# Patient Record
Sex: MALE | Race: WHITE | NOT HISPANIC OR LATINO | Employment: FULL TIME | URBAN - METROPOLITAN AREA
[De-identification: names, ages, dates, MRNs, and addresses within clinical notes are randomized per-mention and may not be internally consistent; named-entity substitution may affect disease eponyms.]

---

## 2019-05-25 ENCOUNTER — HOSPITAL ENCOUNTER (INPATIENT)
Facility: HOSPITAL | Age: 59
LOS: 5 days | Discharge: HOME HEALTH | DRG: 330 | End: 2019-05-30
Attending: EMERGENCY MEDICINE | Admitting: COLON & RECTAL SURGERY
Payer: COMMERCIAL

## 2019-05-25 ENCOUNTER — APPOINTMENT (EMERGENCY)
Dept: RADIOLOGY | Facility: HOSPITAL | Age: 59
DRG: 330 | End: 2019-05-25
Attending: EMERGENCY MEDICINE
Payer: COMMERCIAL

## 2019-05-25 DIAGNOSIS — D64.9 ANEMIA, UNSPECIFIED TYPE: ICD-10-CM

## 2019-05-25 DIAGNOSIS — R93.5 ABNORMAL CT OF THE ABDOMEN: ICD-10-CM

## 2019-05-25 DIAGNOSIS — R10.84 GENERALIZED ABDOMINAL PAIN: Primary | ICD-10-CM

## 2019-05-25 DIAGNOSIS — K51.018 ULCERATIVE PANCOLITIS WITH OTHER COMPLICATION: ICD-10-CM

## 2019-05-25 DIAGNOSIS — D72.825 BANDEMIA: ICD-10-CM

## 2019-05-25 DIAGNOSIS — R19.7 DIARRHEA, UNSPECIFIED TYPE: ICD-10-CM

## 2019-05-25 LAB
ALBUMIN SERPL-MCNC: 2 G/DL (ref 3.4–5)
ALP SERPL-CCNC: 58 IU/L (ref 35–126)
ALT SERPL-CCNC: 23 IU/L (ref 16–63)
ANION GAP SERPL CALC-SCNC: 11 MEQ/L (ref 3–15)
ANISOCYTOSIS BLD QL SMEAR: ABNORMAL
AST SERPL-CCNC: 14 IU/L (ref 15–41)
BACTERIA URNS QL MICRO: ABNORMAL /HPF
BASOPHILS # BLD: 0 K/UL (ref 0.01–0.1)
BASOPHILS NFR BLD: 0 %
BILIRUB SERPL-MCNC: 0.6 MG/DL (ref 0.3–1.2)
BILIRUB UR QL STRIP.AUTO: NEGATIVE MG/DL
BUN SERPL-MCNC: 12 MG/DL (ref 8–20)
CALCIUM SERPL-MCNC: 8.1 MG/DL (ref 8.9–10.3)
CHLORIDE SERPL-SCNC: 95 MEQ/L (ref 98–109)
CLARITY UR REFRACT.AUTO: ABNORMAL
CO2 SERPL-SCNC: 26 MEQ/L (ref 22–32)
COLOR UR AUTO: ABNORMAL
CREAT SERPL-MCNC: 0.8 MG/DL
DIFFERENTIAL METHOD BLD: ABNORMAL
EOSINOPHIL # BLD: 0 K/UL (ref 0.04–0.54)
EOSINOPHIL NFR BLD: 0 %
ERYTHROCYTE [DISTWIDTH] IN BLOOD BY AUTOMATED COUNT: 15.9 % (ref 11.6–14.4)
GFR SERPL CREATININE-BSD FRML MDRD: >60 ML/MIN/1.73M*2
GLUCOSE SERPL-MCNC: 174 MG/DL (ref 70–99)
GLUCOSE UR STRIP.AUTO-MCNC: NEGATIVE MG/DL
HCT VFR BLDCO AUTO: 32.3 %
HGB BLD-MCNC: 9.8 G/DL
HGB UR QL STRIP.AUTO: NEGATIVE
HYALINE CASTS #/AREA URNS LPF: ABNORMAL /LPF
KETONES UR STRIP.AUTO-MCNC: NEGATIVE MG/DL
LACTATE SERPL-SCNC: 1.2 MMOL/L (ref 0.4–2)
LEUKOCYTE ESTERASE UR QL STRIP.AUTO: NEGATIVE
LIPASE SERPL-CCNC: 24 U/L (ref 20–51)
LYMPHOCYTES # BLD: 0.84 K/UL (ref 1.2–3.5)
LYMPHOCYTES NFR BLD: 16 %
MAGNESIUM SERPL-MCNC: 1.8 MG/DL (ref 1.8–2.5)
MCH RBC QN AUTO: 22.3 PG (ref 28–33.2)
MCHC RBC AUTO-ENTMCNC: 30.3 G/DL (ref 32.2–36.5)
MCV RBC AUTO: 73.6 FL (ref 83–98)
MICROCYTES BLD QL SMEAR: ABNORMAL
MONOCYTES # BLD: 0.16 K/UL (ref 0.3–1)
MONOCYTES NFR BLD: 3 %
NEUTS BAND # BLD: 1.05 K/UL (ref 0–0.53)
NEUTS BAND # BLD: 3.21 K/UL (ref 1.7–7)
NEUTS BAND NFR BLD: 20 %
NEUTS SEG NFR BLD: 61 %
NITRITE UR QL STRIP.AUTO: NEGATIVE
OVALOCYTES BLD QL SMEAR: ABNORMAL
PDW BLD AUTO: ABNORMAL FL (ref 9.4–12.4)
PH UR STRIP.AUTO: 6 [PH]
PLATELET # BLD AUTO: 187 K/UL
PLATELET # BLD EST: ABNORMAL 10*3/UL
PLATELET CLUMP BLD QL SMEAR: PRESENT
POTASSIUM SERPL-SCNC: 3.9 MEQ/L (ref 3.6–5.1)
PROT SERPL-MCNC: 5.9 G/DL (ref 6–8.2)
PROT UR QL STRIP.AUTO: 1
RBC # BLD AUTO: 4.39 M/UL (ref 4.5–5.8)
RBC #/AREA URNS HPF: ABNORMAL /HPF
SODIUM SERPL-SCNC: 132 MEQ/L (ref 136–144)
SP GR UR REFRACT.AUTO: 1.03
SQUAMOUS URNS QL MICRO: 1 /HPF
UROBILINOGEN UR STRIP-ACNC: 1 EU/DL
WBC # BLD AUTO: 5.26 K/UL
WBC #/AREA URNS HPF: ABNORMAL /HPF

## 2019-05-25 PROCEDURE — 21400000 HC ROOM AND CARE CCU/INTERMEDIATE

## 2019-05-25 PROCEDURE — 81001 URINALYSIS AUTO W/SCOPE: CPT | Performed by: EMERGENCY MEDICINE

## 2019-05-25 PROCEDURE — 83605 ASSAY OF LACTIC ACID: CPT | Performed by: EMERGENCY MEDICINE

## 2019-05-25 PROCEDURE — 99223 1ST HOSP IP/OBS HIGH 75: CPT | Mod: 57 | Performed by: COLON & RECTAL SURGERY

## 2019-05-25 PROCEDURE — 99285 EMERGENCY DEPT VISIT HI MDM: CPT | Mod: 25

## 2019-05-25 PROCEDURE — 63600000 HC DRUGS/DETAIL CODE: Performed by: SURGERY

## 2019-05-25 PROCEDURE — 74177 CT ABD & PELVIS W/CONTRAST: CPT

## 2019-05-25 PROCEDURE — 87040 BLOOD CULTURE FOR BACTERIA: CPT | Mod: 91 | Performed by: EMERGENCY MEDICINE

## 2019-05-25 PROCEDURE — 63600105 HC IODINE BASED CONTRAST: Performed by: EMERGENCY MEDICINE

## 2019-05-25 PROCEDURE — 83690 ASSAY OF LIPASE: CPT | Performed by: EMERGENCY MEDICINE

## 2019-05-25 PROCEDURE — 36415 COLL VENOUS BLD VENIPUNCTURE: CPT | Performed by: EMERGENCY MEDICINE

## 2019-05-25 PROCEDURE — 85025 COMPLETE CBC W/AUTO DIFF WBC: CPT | Performed by: EMERGENCY MEDICINE

## 2019-05-25 PROCEDURE — 83735 ASSAY OF MAGNESIUM: CPT | Performed by: EMERGENCY MEDICINE

## 2019-05-25 PROCEDURE — 25800000 HC PHARMACY IV SOLUTIONS: Performed by: EMERGENCY MEDICINE

## 2019-05-25 PROCEDURE — 80053 COMPREHEN METABOLIC PANEL: CPT | Performed by: EMERGENCY MEDICINE

## 2019-05-25 PROCEDURE — 25000000 HC PHARMACY GENERAL: Performed by: SURGERY

## 2019-05-25 PROCEDURE — 63600000 HC DRUGS/DETAIL CODE: Performed by: EMERGENCY MEDICINE

## 2019-05-25 RX ORDER — ENOXAPARIN SODIUM 100 MG/ML
40 INJECTION SUBCUTANEOUS
Status: DISCONTINUED | OUTPATIENT
Start: 2019-05-26 | End: 2019-05-30 | Stop reason: HOSPADM

## 2019-05-25 RX ORDER — DEXTROSE 40 %
15-30 GEL (GRAM) ORAL AS NEEDED
Status: DISCONTINUED | OUTPATIENT
Start: 2019-05-25 | End: 2019-05-30 | Stop reason: HOSPADM

## 2019-05-25 RX ORDER — HYDROMORPHONE HYDROCHLORIDE 1 MG/ML
0.5 INJECTION, SOLUTION INTRAMUSCULAR; INTRAVENOUS; SUBCUTANEOUS ONCE
Status: COMPLETED | OUTPATIENT
Start: 2019-05-25 | End: 2019-05-25

## 2019-05-25 RX ORDER — MORPHINE SULFATE 4 MG/ML
2 INJECTION, SOLUTION INTRAMUSCULAR; INTRAVENOUS ONCE
Status: COMPLETED | OUTPATIENT
Start: 2019-05-25 | End: 2019-05-25

## 2019-05-25 RX ORDER — DEXTROSE 50 % IN WATER (D50W) INTRAVENOUS SYRINGE
25 AS NEEDED
Status: DISCONTINUED | OUTPATIENT
Start: 2019-05-25 | End: 2019-05-30 | Stop reason: HOSPADM

## 2019-05-25 RX ORDER — SODIUM CHLORIDE, SODIUM LACTATE, POTASSIUM CHLORIDE, CALCIUM CHLORIDE 600; 310; 30; 20 MG/100ML; MG/100ML; MG/100ML; MG/100ML
INJECTION, SOLUTION INTRAVENOUS CONTINUOUS
Status: ACTIVE | OUTPATIENT
Start: 2019-05-25 | End: 2019-05-27

## 2019-05-25 RX ORDER — IBUPROFEN 200 MG
16-32 TABLET ORAL AS NEEDED
Status: DISCONTINUED | OUTPATIENT
Start: 2019-05-25 | End: 2019-05-30 | Stop reason: HOSPADM

## 2019-05-25 RX ORDER — PREDNISONE 10 MG/1
30 TABLET ORAL DAILY
COMMUNITY
End: 2019-05-30 | Stop reason: HOSPADM

## 2019-05-25 RX ORDER — SODIUM CHLORIDE 9 MG/ML
125 INJECTION, SOLUTION INTRAVENOUS CONTINUOUS
Status: DISCONTINUED | OUTPATIENT
Start: 2019-05-25 | End: 2019-05-26

## 2019-05-25 RX ORDER — METRONIDAZOLE 500 MG/100ML
500 INJECTION, SOLUTION INTRAVENOUS
Status: DISCONTINUED | OUTPATIENT
Start: 2019-05-25 | End: 2019-05-29

## 2019-05-25 RX ADMIN — CEFTRIAXONE SODIUM 1 G: 1 INJECTION, POWDER, FOR SOLUTION INTRAVENOUS at 22:39

## 2019-05-25 RX ADMIN — MORPHINE SULFATE 2 MG: 4 INJECTION INTRAVENOUS at 15:42

## 2019-05-25 RX ADMIN — IOHEXOL 80 ML: 300 INJECTION, SOLUTION INTRAVENOUS at 19:39

## 2019-05-25 RX ADMIN — MORPHINE SULFATE 2 MG: 4 INJECTION INTRAVENOUS at 17:00

## 2019-05-25 RX ADMIN — HYDROMORPHONE HYDROCHLORIDE 0.5 MG: 1 INJECTION, SOLUTION INTRAMUSCULAR; INTRAVENOUS; SUBCUTANEOUS at 22:38

## 2019-05-25 RX ADMIN — SODIUM CHLORIDE 125 ML/HR: 9 INJECTION, SOLUTION INTRAVENOUS at 21:33

## 2019-05-25 RX ADMIN — METRONIDAZOLE 500 MG: 500 INJECTION, SOLUTION INTRAVENOUS at 23:18

## 2019-05-25 RX ADMIN — MORPHINE SULFATE 2 MG: 4 INJECTION INTRAVENOUS at 20:15

## 2019-05-25 RX ADMIN — SODIUM CHLORIDE 1000 ML: 9 INJECTION, SOLUTION INTRAVENOUS at 15:41

## 2019-05-25 NOTE — ED PROVIDER NOTES
--------------------------------------------------------------------------------------------------  ER 2    HPI  HPI   Chief Complaint   Patient presents with   • Abdominal Pain      HPI      Abdominal pain mainly left upper  Persistent diarrhea reports 27 episodes a day  Reports intermittent fevers at home  Status post colonoscopy 9 days ago by Dr. GREEN    He reports that he is been having diarrhea for over a year    Patient reports he does not have a primary doctor    No history of abdominal surgeries    Procedure results are noted on patient's paperwork:             Past Hx  Patient History   History reviewed. No pertinent past medical history.  History reviewed. No pertinent surgical history.  History reviewed. No pertinent family history.  Social History   Substance Use Topics   • Smoking status: Never Smoker   • Smokeless tobacco: Never Used   • Alcohol use No           ROS  Review of Systems   Review of Systems  General     No chills or diaphoresis.  HEENT    No congestion, rhinorrhea, sore throat or trouble swallowing.     No photophobia or discharge.    No neck pain  Cardio/Pulm    No CP or tightness, palpitations, leg swelling, shortness of breath, or orthopnea.     No cough, wheezing, or stridor.  Abdomen and Back    No n/v. No black tarry stool.    No new back pain.     No dysuria, flank pain, frequency, hematuria or urgency.  Extremities and Skin    No joint swelling.    No new rash.  Neuro/Psych    No syncope, weakness, or headaches.     No confusion or self-injury.    All other systems reviewed and are negative.  Systems reviewed from RN triage:            EXAM  Physical Exam   ED Triage Vitals   Temp Heart Rate Resp BP SpO2   05/25/19 1422 05/25/19 1422 05/25/19 1422 05/25/19 1422 05/25/19 1422   36.9 °C (98.5 °F) (!) 106 16 101/72 96 %      Temp Source Heart Rate Source Patient Position BP Location FiO2 (%) (Set)   05/25/19 1422 05/25/19 1540 05/25/19 1422 05/25/19 1422 --   Oral Monitor Sitting Right  upper arm      Physical Exam  General     Non-toxic appearance. NAD.   HEENT    Normocephalic and atraumatic.     Conjunctivae, EOM and lids are grossly normal. PERRL.    No rhinorrhea. Very dry mucous membranes    Normal ROM and full passive ROM of neck without pain. Neck supple.     No JVD. No tracheal deviation present.   Cardio/Pulm    RRR, normal heart sounds and intact distal pulses. No murmur heard.    No respiratory distress. Effort normal and normal BS bilat.     No stridor. No wheezes and no rales.   Abdomen and Back    Abd Soft Normal appearance and BS normal. Mod tender Left side, No distension.     No rebound. Mild guarding. No CVA tenderness.   Extremities and Skin    Extrem are grossly normal. normal ROM. No edema or tenderness.     Skin is warm, dry and intact. No rash noted. Not diaphoretic. No pallor.  Neuro/Psych    AAOx3. Cooperative and mood/affect normal.    No aphasia. Speech normal. No dysphasia or dysarthria.     Vision grossly normal     No gross CN deficit or facial weakness.     No gross motor deficit and moves all extremities equally.      No gross sensory deficit.        Nursing note and vitals reviewed.         Procedures    COURSE  MDM   ED Course & MDM     Labs Reviewed   COMPREHENSIVE METABOLIC PANEL - Abnormal        Result Value    Sodium 132 (*)     Potassium 3.9      Chloride 95 (*)     CO2 26      BUN 12      Creatinine 0.8      Glucose 174 (*)     Calcium 8.1 (*)     AST (SGOT) 14 (*)     ALT (SGPT) 23      Alkaline Phosphatase 58      Total Protein 5.9 (*)     Albumin 2.0 (*)     Bilirubin, Total 0.6      eGFR >60.0      Anion Gap 11     CBC - Abnormal     WBC 5.26      RBC 4.39 (*)     Hemoglobin 9.8 (*)     Hematocrit 32.3 (*)     MCV 73.6 (*)     MCH 22.3 (*)     MCHC 30.3 (*)     RDW 15.9 (*)     Platelets 187      MPV       DIFF COUNT - Abnormal     Differential Type Manu      Neutrophils 61      Lymphocytes 16      Monocytes 3      Eosinophils 0      Basophils 0       Bands 20 (*)     Neutrophils, Absolute 3.21      Lymphocytes, Absolute 0.84 (*)     Monocytes, Absolute 0.16 (*)     Eosinophils, Absolute 0.00 (*)     Basophils, Absolute 0.00 (*)     Bands, Absolute 1.05 (*)     Clumped Platelets Present      Anisocytosis 1+      Microcytes 1+      Ovalocytes Occasional      Platelet Estimate Adequate (150,000-400,000)     UA REFLEX CULTURE (MACROSCOPIC) - Abnormal     Color, Urine Melissa (*)     Clarity, Urine Cloudy (*)     Specific Gravity, Urine 1.027      pH, Urine 6.0      Leukocyte Esterase Negative      Nitrite, Urine Negative      Protein, Urine +1 (*)     Glucose, Urine Negative      Ketones, Urine Negative      Urobilinogen, Urine 1.0      Bilirubin, Urine Negative      Blood, Urine Negative     UA MICROSCOPIC - Abnormal     RBC, Urine 0 TO 4      WBC, Urine 0 TO 3      Squamous Epithelial +1 (*)     Hyaline Cast 3 TO 9 (*)     Bacteria, Urine None Seen     LIPASE - Normal    Lipase 24     LACTATE, VENOUS - Normal    Lactate 1.2     MAGNESIUM - Normal    Magnesium 1.8     BLOOD CULTURE    Narrative:     The following orders were created for panel order Blood Culture Blood, Venous.  Procedure                               Abnormality         Status                     ---------                               -----------         ------                     Blood Culture Blood, Venous[[id number]]                       In process                   Please view results for these tests on the individual orders.   BLOOD CULTURE    Narrative:     The following orders were created for panel order Blood Culture Blood, Venous.  Procedure                               Abnormality         Status                     ---------                               -----------         ------                     Blood Culture Blood, Venous[[id number]]                       In process                   Please view results for these tests on the individual orders.   BLOOD CULTURE   BLOOD CULTURE   CBC  AND DIFFERENTIAL    Narrative:     The following orders were created for panel order CBC and differential.  Procedure                               Abnormality         Status                     ---------                               -----------         ------                     CBC[[id number]]                           Abnormal            Final result               Diff Count[[id number]]                    Abnormal            Final result                 Please view results for these tests on the individual orders.   URINALYSIS REFLEX CULTURE    Narrative:     The following orders were created for panel order Urinalysis w/ reflex culture.  Procedure                               Abnormality         Status                     ---------                               -----------         ------                     UA Reflex to Culture (Mac...[[id number]]  Abnormal            Final result               UA Microscopic[[id number]]                Abnormal            Final result                 Please view results for these tests on the individual orders.   PATH REVIEW       CT ABDOMEN PELVIS WITH IV CONTRAST   Final Result   IMPRESSION:   1.  Marked gaseous dilation of the mid to distal transverse colon up to a   diameter of 17.4 cm. Proximal to this, there is stool throughout the colon.   Along the distal aspect of the dilated segment and distal to this, there is   diffuse wall thickening from the splenic flecture through the descending and   rectosigmoid colon, consistent with colitis, most likely infectious or   inflammatory, such as Crohn's disease. Given the presence of colitis and the   dilation of the transverse colon, findings are suspicious for toxic megacolon.   2.  Splenomegaly.      Finding:    Other   Acuity: Critical  Status:  CLOSED      Critical read back was performed and results were read back by Garth Green,   5/25/2019 9:14 PM.             Patient Vitals for the past 24 hrs:   BP Temp Temp src Pulse Resp  "SpO2 Height Weight   05/25/19 2200 122/75 - - 69 16 95 % - -   05/25/19 2132 139/82 - - 65 20 94 % - -   05/25/19 2014 124/74 - - 68 18 94 % - -   05/25/19 1900 113/81 - - 65 19 94 % - -   05/25/19 1830 126/76 - - 66 17 95 % - -   05/25/19 1800 112/75 - - 64 16 95 % - -   05/25/19 1730 121/82 - - 67 18 95 % - -   05/25/19 1700 119/75 - - 64 17 95 % - -   05/25/19 1630 116/76 - - 67 (!) 22 95 % - -   05/25/19 1600 113/71 - - 65 16 94 % - -   05/25/19 1540 114/64 - - 74 18 95 % - -   05/25/19 1422 101/72 36.9 °C (98.5 °F) Oral (!) 106 16 96 % 1.727 m (5' 8\") 72.6 kg (160 lb)       Critical Care  Performed by: GARTH GREEN  Authorized by: GARTH GREEN     Critical care provider statement:     Critical care time (minutes):  35    Critical care time was exclusive of:  Separately billable procedures and treating other patients    Critical care was time spent personally by me on the following activities:  Development of treatment plan with patient or surrogate, discussions with consultants, discussions with primary provider, evaluation of patient's response to treatment, examination of patient, review of old charts, re-evaluation of patient's condition, pulse oximetry, ordering and review of radiographic studies, ordering and review of laboratory studies and ordering and performing treatments and interventions      ED Course as of May 25 2216   Sat May 25, 2019   2122 D/w radiol  D/w surg  D/w treating RN  Pt npo  ivf  []      ED Course User Index  [] Garth Green MD         Clinical Impressions as of May 25 2216   Generalized abdominal pain   Bandemia   Diarrhea, unspecified type   Anemia, unspecified type   Abnormal CT of the abdomen          /75 (05/25/19 2200)    Temp      Pulse 69 (05/25/19 2200)   Resp 16 (05/25/19 2200)    SpO2 95 % (05/25/19 2200)        IMPRESSION  Final diagnoses:   [R10.84] Generalized abdominal pain   [D72.825] Bandemia   [R19.7] Diarrhea, unspecified type   [D64.9] Anemia, " unspecified type   [R93.5] Abnormal CT of the abdomen   Probable UC and toxic megacolon      PLAN  admit       -----------------------------  Garth Green MD  5/25/2019 @ 10:16 PM  --------------------------------------------------------------------------------------------------     Garth Green MD  05/25/19 2213

## 2019-05-25 NOTE — Clinical Note
Admitting Provider: NIMO ASKEW [[id number]]   Level of Care: Stepdown [14]   Bed request comments: 1 pav

## 2019-05-26 ENCOUNTER — APPOINTMENT (INPATIENT)
Dept: RADIOLOGY | Facility: HOSPITAL | Age: 59
DRG: 330 | End: 2019-05-26
Attending: STUDENT IN AN ORGANIZED HEALTH CARE EDUCATION/TRAINING PROGRAM
Payer: COMMERCIAL

## 2019-05-26 ENCOUNTER — ANESTHESIA EVENT (INPATIENT)
Dept: OPERATING ROOM | Facility: HOSPITAL | Age: 59
DRG: 330 | End: 2019-05-26
Payer: COMMERCIAL

## 2019-05-26 PROBLEM — K50.119 CROHN'S DISEASE OF COLON WITH COMPLICATION (CMS/HCC): Status: ACTIVE | Noted: 2019-05-26

## 2019-05-26 PROBLEM — K51.90 ULCERATIVE COLITIS (CMS/HCC): Status: RESOLVED | Noted: 2019-05-25 | Resolved: 2019-05-26

## 2019-05-26 PROBLEM — K51.90 ULCERATIVE COLITIS (CMS/HCC): Status: ACTIVE | Noted: 2019-05-25

## 2019-05-26 LAB
ANION GAP SERPL CALC-SCNC: 6 MEQ/L (ref 3–15)
ANISOCYTOSIS BLD QL SMEAR: ABNORMAL
BASOPHILS # BLD: 0 K/UL (ref 0.01–0.1)
BASOPHILS NFR BLD: 0 %
BUN SERPL-MCNC: 9 MG/DL (ref 8–20)
CALCIUM SERPL-MCNC: 7.6 MG/DL (ref 8.9–10.3)
CHLORIDE SERPL-SCNC: 99 MEQ/L (ref 98–109)
CO2 SERPL-SCNC: 28 MEQ/L (ref 22–32)
CREAT SERPL-MCNC: 0.7 MG/DL
DIFFERENTIAL METHOD BLD: ABNORMAL
EOSINOPHIL # BLD: 0 K/UL (ref 0.04–0.54)
EOSINOPHIL NFR BLD: 0 %
ERYTHROCYTE [DISTWIDTH] IN BLOOD BY AUTOMATED COUNT: 15.9 % (ref 11.6–14.4)
GFR SERPL CREATININE-BSD FRML MDRD: >60 ML/MIN/1.73M*2
GIANT PLATELETS BLD QL SMEAR: ABNORMAL
GLUCOSE SERPL-MCNC: 94 MG/DL (ref 70–99)
HCT VFR BLDCO AUTO: 26.7 %
HGB BLD-MCNC: 8 G/DL
LACTATE SERPL-SCNC: 0.7 MMOL/L (ref 0.4–2)
LYMPHOCYTES # BLD: 1.28 K/UL (ref 1.2–3.5)
LYMPHOCYTES NFR BLD: 32 %
MAGNESIUM SERPL-MCNC: 1.7 MG/DL (ref 1.8–2.5)
MCH RBC QN AUTO: 21.9 PG (ref 28–33.2)
MCHC RBC AUTO-ENTMCNC: 30 G/DL (ref 32.2–36.5)
MCV RBC AUTO: 73.2 FL (ref 83–98)
MONOCYTES # BLD: 0.28 K/UL (ref 0.3–1)
MONOCYTES NFR BLD: 7 %
NEUTS BAND # BLD: 0.12 K/UL (ref 0–0.53)
NEUTS BAND # BLD: 2.33 K/UL (ref 1.7–7)
NEUTS BAND NFR BLD: 3 %
NEUTS SEG NFR BLD: 58 %
OVALOCYTES BLD QL SMEAR: ABNORMAL
PATH REV BLD -IMP: NORMAL
PDW BLD AUTO: 10.3 FL (ref 9.4–12.4)
PLAT MORPH BLD: NORMAL
PLATELET # BLD AUTO: 335 K/UL
PLATELET # BLD EST: ABNORMAL 10*3/UL
PLATELET CLUMP BLD QL SMEAR: PRESENT
POLYCHROMASIA BLD QL SMEAR: ABNORMAL
POTASSIUM SERPL-SCNC: 4 MEQ/L (ref 3.6–5.1)
RBC # BLD AUTO: 3.65 M/UL (ref 4.5–5.8)
SODIUM SERPL-SCNC: 133 MEQ/L (ref 136–144)
TOXIC GRANULES BLD QL SMEAR: ABNORMAL
WBC # BLD AUTO: 4.01 K/UL

## 2019-05-26 PROCEDURE — 93005 ELECTROCARDIOGRAM TRACING: CPT | Performed by: STUDENT IN AN ORGANIZED HEALTH CARE EDUCATION/TRAINING PROGRAM

## 2019-05-26 PROCEDURE — 63600000 HC DRUGS/DETAIL CODE: Performed by: STUDENT IN AN ORGANIZED HEALTH CARE EDUCATION/TRAINING PROGRAM

## 2019-05-26 PROCEDURE — 36415 COLL VENOUS BLD VENIPUNCTURE: CPT | Performed by: SURGERY

## 2019-05-26 PROCEDURE — 74018 RADEX ABDOMEN 1 VIEW: CPT

## 2019-05-26 PROCEDURE — 83605 ASSAY OF LACTIC ACID: CPT | Performed by: SURGERY

## 2019-05-26 PROCEDURE — 85025 COMPLETE CBC W/AUTO DIFF WBC: CPT | Performed by: SURGERY

## 2019-05-26 PROCEDURE — 200200 PR NO CHARGE: Performed by: COLON & RECTAL SURGERY

## 2019-05-26 PROCEDURE — 99222 1ST HOSP IP/OBS MODERATE 55: CPT | Performed by: INTERNAL MEDICINE

## 2019-05-26 PROCEDURE — 80048 BASIC METABOLIC PNL TOTAL CA: CPT | Performed by: SURGERY

## 2019-05-26 PROCEDURE — 21400000 HC ROOM AND CARE CCU/INTERMEDIATE

## 2019-05-26 PROCEDURE — 25000000 HC PHARMACY GENERAL: Performed by: SURGERY

## 2019-05-26 PROCEDURE — 83735 ASSAY OF MAGNESIUM: CPT | Performed by: SURGERY

## 2019-05-26 PROCEDURE — 63600000 HC DRUGS/DETAIL CODE: Performed by: SURGERY

## 2019-05-26 RX ORDER — GABAPENTIN 300 MG/1
600 CAPSULE ORAL ONCE
Status: ACTIVE | OUTPATIENT
Start: 2019-05-26 | End: 2019-05-26

## 2019-05-26 RX ORDER — MORPHINE SULFATE 2 MG/ML
2 INJECTION, SOLUTION INTRAMUSCULAR; INTRAVENOUS ONCE
Status: COMPLETED | OUTPATIENT
Start: 2019-05-26 | End: 2019-05-26

## 2019-05-26 RX ORDER — CELECOXIB 200 MG/1
200 CAPSULE ORAL ONCE
Status: ACTIVE | OUTPATIENT
Start: 2019-05-26 | End: 2019-05-26

## 2019-05-26 RX ORDER — ACETAMINOPHEN 325 MG/1
975 TABLET ORAL ONCE
Status: ACTIVE | OUTPATIENT
Start: 2019-05-26 | End: 2019-05-26

## 2019-05-26 RX ORDER — KETOROLAC TROMETHAMINE 30 MG/ML
30 INJECTION, SOLUTION INTRAMUSCULAR; INTRAVENOUS ONCE
Status: COMPLETED | OUTPATIENT
Start: 2019-05-26 | End: 2019-05-26

## 2019-05-26 RX ORDER — CEFAZOLIN SODIUM 2 G/50ML
2 SOLUTION INTRAVENOUS
Status: DISCONTINUED | OUTPATIENT
Start: 2019-05-26 | End: 2019-05-27 | Stop reason: HOSPADM

## 2019-05-26 RX ADMIN — METRONIDAZOLE 500 MG: 500 INJECTION, SOLUTION INTRAVENOUS at 06:16

## 2019-05-26 RX ADMIN — CEFTRIAXONE SODIUM 1 G: 1 INJECTION, POWDER, FOR SOLUTION INTRAVENOUS at 22:05

## 2019-05-26 RX ADMIN — SODIUM CHLORIDE, POTASSIUM CHLORIDE, SODIUM LACTATE AND CALCIUM CHLORIDE: 600; 310; 30; 20 INJECTION, SOLUTION INTRAVENOUS at 00:33

## 2019-05-26 RX ADMIN — MORPHINE SULFATE 2 MG: 2 INJECTION, SOLUTION INTRAMUSCULAR; INTRAVENOUS at 06:49

## 2019-05-26 RX ADMIN — MAGNESIUM SULFATE IN WATER 4 G: 40 INJECTION, SOLUTION INTRAVENOUS at 14:12

## 2019-05-26 RX ADMIN — SODIUM CHLORIDE, POTASSIUM CHLORIDE, SODIUM LACTATE AND CALCIUM CHLORIDE: 600; 310; 30; 20 INJECTION, SOLUTION INTRAVENOUS at 17:51

## 2019-05-26 RX ADMIN — SODIUM CHLORIDE, POTASSIUM CHLORIDE, SODIUM LACTATE AND CALCIUM CHLORIDE: 600; 310; 30; 20 INJECTION, SOLUTION INTRAVENOUS at 09:03

## 2019-05-26 RX ADMIN — KETOROLAC TROMETHAMINE 30 MG: 30 INJECTION, SOLUTION INTRAMUSCULAR; INTRAVENOUS at 20:08

## 2019-05-26 RX ADMIN — MORPHINE SULFATE 2 MG: 2 INJECTION, SOLUTION INTRAMUSCULAR; INTRAVENOUS at 14:12

## 2019-05-26 RX ADMIN — METRONIDAZOLE 500 MG: 500 INJECTION, SOLUTION INTRAVENOUS at 14:12

## 2019-05-26 RX ADMIN — METRONIDAZOLE 500 MG: 500 INJECTION, SOLUTION INTRAVENOUS at 23:21

## 2019-05-26 ASSESSMENT — ENCOUNTER SYMPTOMS
BLOOD IN STOOL: 0
DIARRHEA: 1
NAUSEA: 0
ABDOMINAL PAIN: 1
UNEXPECTED WEIGHT CHANGE: 1
WEAKNESS: 1
VOMITING: 0

## 2019-05-26 ASSESSMENT — COGNITIVE AND FUNCTIONAL STATUS - GENERAL
EATING MEALS: 4 - NONE
STANDING UP FROM CHAIR USING ARMS: 4 - NONE
DRESSING REGULAR LOWER BODY CLOTHING: 4 - NONE
DRESSING REGULAR UPPER BODY CLOTHING: 4 - NONE
CLIMB 3 TO 5 STEPS WITH RAILING: 4 - NONE
TOILETING: 4 - NONE
HELP NEEDED FOR PERSONAL GROOMING: 4 - NONE
WALKING IN HOSPITAL ROOM: 4 - NONE
HELP NEEDED FOR BATHING: 4 - NONE
MOVING TO AND FROM BED TO CHAIR: 4 - NONE

## 2019-05-26 NOTE — PLAN OF CARE
Problem: Patient Care Overview  Goal: Plan of Care Review  Outcome: Ongoing (interventions implemented as appropriate)   05/26/19 5726   Coping/Psychosocial   Plan Of Care Reviewed With patient   Plan of Care Review   Progress improving   Outcome Summary Plan for surgery tomorrow        Problem: Pressure Ulcer Risk (Phil Scale) (Adult,Obstetrics,Pediatric)  Goal: Identify Related Risk Factors and Signs and Symptoms  Outcome: Outcome(s) Achieved Date Met: 05/26/19    Goal: Skin Integrity  Outcome: Ongoing (interventions implemented as appropriate)      Problem: Fall Risk (Adult)  Goal: Identify Related Risk Factors and Signs and Symptoms  Outcome: Outcome(s) Achieved Date Met: 05/26/19    Goal: Absence of Falls  Outcome: Ongoing (interventions implemented as appropriate)      Problem: Pain, Acute (Adult)  Goal: Identify Related Risk Factors and Signs and Symptoms  Outcome: Outcome(s) Achieved Date Met: 05/26/19    Goal: Acceptable Pain Control/Comfort Level  Outcome: Ongoing (interventions implemented as appropriate)

## 2019-05-26 NOTE — CONSULTS
Gastroenterology  Consultation Note       REASON FOR CONSULT   Crohn's colitis, dilated transverse colon    Consulting Physician: James Hernandez   HISTORY OF PRESENT ILLNESS      This is a 59 y.o. male with a past medical history of severe Crohn's colitis involving the splenic flexure to the rectum currently on Remicade who presents with abdominal pain, diarrhea, CT findings of a dilated transverse colon.  Patient was first diagnosed with Crohn's colitis 7 years ago.  He had predominantly been managed in [place], where he had previously been treated with various courses of prednisone and Humira.  He had no response to Humira and therefore had recently been switched to Remicade.  Despite use of alternate biologic agent, he reports that he continued to have persistent left lower quadrant abdominal pain and copious diarrhea.  Due to symptoms, dosing of Remicade had become more frequent and the patient reports that he is now on q4 week dosing.  He had been seen in the ER [place] multiple times over the past few weeks-months for worsening pain issues and frequent stools, and had been told that his colon appeared to become progressively more dilated on CT examinations. He was unhappy with his care in [place] and switched providers; he recently saw Dr. Carlson 2 weeks ago for a first-time visit.  Remicade drug level and antibody testing was completed; drug level is 3.5 with positive antibody formation. Colonoscopy was completed approximately 10 days ago showing severe ulcerated mucosa extending from the splenic flexure to the rectum.    The patient reports that his abdominal pain symptoms have been ongoing since prior to colonoscopy.  This has not worsened in the past 2 weeks, but has been consistently severe.  He reports between 20-30 loose watery bowel movements per day.  There is no blood in his stools.  No cramping, fevers, nausea, vomiting.  He reports that he has lost probably 50 pounds in the last  few months.  No fevers.  Was previously tolerating an oral diet without difficulty.  Denies use of opioids.  Intermittent use of NSAIDs and Tylenol.      On arrival to the emergency department, patient was found to have an albumin of 2.0, normal WBC with a left shift to 20% neutrophils.  Lactate normal 1.2.  CT scan completed in the emergency department shows dilation of the transverse colon to 17.5 cm with significant diffuse wall thickening from the splenic flexure through the sigmoid colon.        PAST MEDICAL AND SURGICAL HISTORY      PMHx:  History reviewed. No pertinent past medical history.    PSHx:  History reviewed. No pertinent surgical history.    PCP:   Pt States, No Pcp    MEDICATIONS      Prescriptions Prior to Admission   Medication Sig Dispense Refill Last Dose   • predniSONE (DELTASONE) 10 mg tablet Take 30 mg by mouth daily.          Home medications were personally reviewed.    ALLERGIES      Patient has no known allergies.    FAMILY HISTORY      History reviewed. No pertinent family history.    SOCIAL HISTORY      Social History     Social History   • Marital status: Single     Spouse name: N/A   • Number of children: N/A   • Years of education: N/A     Social History Main Topics   • Smoking status: Never Smoker   • Smokeless tobacco: Never Used   • Alcohol use No   • Drug use: No   • Sexual activity: Not Asked     Other Topics Concern   • None     Social History Narrative   • None       REVIEW OF SYSTEMS      Review of Systems   Constitutional: Positive for unexpected weight change.   Gastrointestinal: Positive for abdominal pain and diarrhea. Negative for blood in stool, nausea and vomiting.   Neurological: Positive for weakness.       PHYSICAL EXAMINATION      Temp:  [36.4 °C (97.6 °F)-37.1 °C (98.8 °F)] 37.1 °C (98.7 °F)  Heart Rate:  [] 72  Resp:  [16-22] 16  BP: (100-139)/(64-82) 114/67  Body mass index is 25.27 kg/m².    Physical Exam   Constitutional: He is oriented to person,  place, and time. No distress.   Patient appears relatively comfortable, awake, conversant   Eyes: Pupils are equal, round, and reactive to light.   Cardiovascular: Normal rate and regular rhythm.  Exam reveals no friction rub.    No murmur heard.  Pulmonary/Chest: Effort normal and breath sounds normal. No respiratory distress. He has no wheezes.   Abdominal:   Abdomen soft, distended, tympanic to percussion, tenderness to palpation particularly in the left lower quadrant, decreased bowel sounds throughout   Musculoskeletal: Normal range of motion. He exhibits no edema.   Neurological: He is alert and oriented to person, place, and time.   Skin: Skin is warm and dry. He is not diaphoretic.       LABS / IMAGING / EKG        Labs  I have reviewed the patient's pertinent labs.  Significant abnormals are Noted below.    Results from last 7 days  Lab Units 05/26/19  0711 05/25/19  1458   SODIUM mEQ/L 133* 132*   POTASSIUM mEQ/L 4.0 3.9   CHLORIDE mEQ/L 99 95*   CO2 mEQ/L 28 26   BUN mg/dL 9 12   CREATININE mg/dL 0.7* 0.8   CALCIUM mg/dL 7.6* 8.1*   ALBUMIN g/dL  --  2.0*   BILIRUBIN TOTAL mg/dL  --  0.6   ALK PHOS IU/L  --  58   ALT IU/L  --  23   AST IU/L  --  14*   GLUCOSE mg/dL 94 174*       Results from last 7 days  Lab Units 05/26/19  0711   MAGNESIUM mg/dL 1.7*       Results from last 7 days  Lab Units 05/26/19  0711 05/25/19  1458   WBC K/uL 4.01 5.26   HEMOGLOBIN g/dL 8.0* 9.8*   HEMATOCRIT % 26.7* 32.3*   PLATELETS K/uL 335 187   DIFF TYPE   --  Manu   NEUTROS PCT MAN %  --  61   LYMPHO PCT MAN %  --  16   MONO PCT MAN %  --  3   EOSINO PCT MAN %  --  0   BASOS PCT MAN %  --  0   BANDS PCT MAN %  --  20*   SEGS ABS MAN K/uL  --  3.21   LYMPHO ABS MAN K/uL  --  0.84*   MONO ABS MAN K/uL  --  0.16*   EOS ABS MAN K/uL  --  0.00*   BASOS ABS MAN K/uL  --  0.00*         Imaging  I have independently reviewed the pertinent imaging from the last 24 hrs.    ASSESSMENT AND PLAN      Crohn's disease of colon with  complication (CMS/HCC) (HCC)   Assessment & Plan    59-year-old gentleman with a history of Crohn's colitis previously failed Humira, currently on Remicade, now presenting with toxic megacolon and dilated transverse colon diameter of 17.4 cm.  The patient does have a low albumin level of 2.0 and bandemia despite normal white blood cell count which can be seen with toxic megacolon.  He clinically appears much more stable and comfortable than his imaging findings would suggest.     Plan:  -Please check stool studies to rule out C. difficile or other infectious source of toxic megacolon.  -Check ESR, CRP (improvement in these indices status post administration of biologic agent will be helpful in determining response).  -Continue n.p.o. status.  -Agree with surgical plans for decompressive ostomy either today versus tomorrow.  -We will make arrangements for Stelara administration.  As the patient has antibodies and low drug levels with Remicade, we will discontinue the use of this agent and switch to an alternate class of biologic therapy.  -Hold on additional steroid administration at this point.  -Strict avoidance of opioids.  -Lovenox DVT prophylaxis given high risk for thrombosis formation.            VTE Assessment: Pad[place]    Code Status: Full Code  Estimated discharge date: 5/29/2019

## 2019-05-26 NOTE — H&P
General Surgery History and Physical    Diagnosis: Generalized abdominal pain [R10.84].    HPI     Patient is a 59 y.o. male with a history of inflammatory bowel disease on Remicade presenting with worsening abdominal pain.  Patient states about 3 to 6 months ago he was first diagnosed with inflammatory bowel disease.  He is seeing a GI doctor in [place] who had started him on prednisone as well as Humira.  The patient was unable to tolerate the Humira was changed to Remicade.  He had a dose a few months ago followed by dose 2 weeks later followed by those 4 weeks later followed by dose 8 weeks later.  Patient states he is due for a dose as he was last done about 4 weeks ago however he had missed his dose yesterday.  Patient has changed his gastroenterologist to  about 2 weeks ago and about 9 days ago had a colonoscopy done that showed inflamed and ulcerated mucosa from the rectum to splenic flexure with a relatively normal transverse colon.  Biopsies were taken at the time.  Patient states he is always had a chronic abdominal pain however since he colonoscopy he has had some increasing abdominal pain that has gotten to the point where he can no longer take it.  He has had subjective fevers at home fevers.  Patient states his bowel movements are always quite liquidy and are about 20-30 times a day.  He reports that there is mucus and pus during his  bowel movements however there is no blood.  He does not report any changes in his bowel movements in the last few days has had no nausea or vomiting.    Medical History: recent diagnosis of inflammatory bowel disease    Surgical History: History reviewed. No pertinent surgical history.    Social History:   Social History     Social History   • Marital status: Single     Spouse name: N/A   • Number of children: N/A   • Years of education: N/A     Occupational History   • Not on file.     Social History Main Topics   • Smoking status: Never Smoker   •  Smokeless tobacco: Never Used   • Alcohol use No   • Drug use: No   • Sexual activity: Not on file     Other Topics Concern   • Not on file     Social History Narrative   • No narrative on file         Family History: History reviewed. No pertinent family history.    Allergies: Patient has no known allergies.    Home Medications:  Not in a hospital admission.    Current Medications:  •  cefTRIAXone, 1 g, intravenous, q24h INT  •  glucose, 16-32 g of dextrose, oral, PRN **OR** dextrose, 15-30 g of dextrose, oral, PRN **OR** glucagon, 1 mg, intramuscular, PRN **OR** dextrose in water, 25 mL, intravenous, PRN  •  [START ON 5/26/2019] enoxaparin, 40 mg, subcutaneous, Daily (6a)  •  iohexol, 50 mL, oral, Once  •  lactated ringer's, , intravenous, Continuous  •  metroNIDAZOLE, 500 mg, intravenous, q8h INT  •  sodium chloride 0.9 %, 125 mL/hr, intravenous, Continuous  •  predniSONE    Review of Systems  Pertinent items are noted in HPI.    Objective     Vital Signs for the last 24 hours:  Temp:  [36.9 °C (98.4 °F)-36.9 °C (98.5 °F)] 36.9 °C (98.4 °F)  Heart Rate:  [] 69  Resp:  [16-22] 16  BP: (101-139)/(64-82) 108/68    Physicial Exam    General appearance: alert, cooperative, fatigued and mild distress  Head: normocephalic, without obvious abnormality, atraumatic  Eyes: conjunctivae/corneas clear. PERRL, EOM's intact. Fundi benign.  Lungs: clear to auscultation bilaterally  Chest wall: no tenderness  Heart: regular rate and rhythm, S1, S2 normal, no murmur, click, rub or gallop  Abdomen: Distended, left sided ttp with mild rebound, non tender right abdomen and no rebound tenderness, tympanitic  Rectal: atrophic gluteal muscles, excoriated and erythematous perianal region,small external hemorrhoids, poor sphincter tone, no masses on palpation, liquid stool, no blood/mucus/pus  Extremities: warm, well perfused  Skin: scattered eczematous lesions on skin  Neurologic: Grossly normal    Labs  CBC Results       05/25/19                           1458           WBC 5.26           RBC 4.39 (L)           HGB 9.8 (L)           HCT 32.3 (L)           MCV 73.6 (L)           MCH 22.3 (L)           MCHC 30.3 (L)                      Comment for WBC at 1458 on 05/25/19:  ALL RESULTS HAVE BEEN CHECKED    Comment for PLT at 1458 on 05/25/19:  PLT CLUMPING SUSPECTED. PLT COUNT MAY BE SLIGHTLY HIGHER THAN REPORTED. IF CLINICALLY WARRANTED, SUGGEST COLLECTING SODIUM CITRATE TUBE (BLUE TOP) IN ADDITION TO EDTA TUBE FOR FOLLOW UP CBC TESTING.. CONFIRMED WITH SMEAR ESTIMATE. SPECIMEN QUALITY   CHECKED. RESULTS OBTAINED AFTER VORTEXING TO ELIMINATE PLT CLUMPS        CMP Results       05/25/19                          1458            (L)           K 3.9           Cl 95 (L)           CO2 26           Glucose 174 (H)           BUN 12           Creatinine 0.8           Calcium 8.1 (L)           Anion Gap 11           AST 14 (L)           ALT 23           Albumin 2.0 (L)           EGFR &gt;60.0           Comment for K at 1458 on 05/25/19:    Results obtained on plasma. Plasma Potassium values may be up to 0.4 mEQ/L less than serum values. The differences may be greater for patients with high platelet or white cell counts.            Imaging  I have reviewed the Imaging from the last 24 hrs.    Assessment/Plan     Code Status: Full Code    This is a 59-year-old male with inflammatory bowel disease status unfortunately uncontrolled with still 20-30 bowel movements a day now presenting with increasing abdominal pain and concern due to a 17 cm transverse colon seen on CT scan.  Concern from the ED doctor is for toxic megacolon however the patient has a stable heart rate in the 70s, stable blood pressure in the 120s, no fevers, his white count is a 5 although does have a bandemia with 20% bands.  Patient is tender on his exam more so in the left abdomen and less tender on the right side.  Patient's rectal exam was overall unremarkable for anything  acute but does have some excoriations and erythema of the perianal area due to chronic irritation from his bowel movements.  Patient will be ordered zinc oxide to alleviate the symptoms.  For the patient's: At this time we will continue to monitor with serial abdominal exams and a repeat KUB in the morning.  The concern for the patient is for possibility of perforation or ischemia of the colon that can progress further and make the patient quite ill.  This time is pretty stable but there is a possibility of surgery for this patient given his CT findings.  We will consult gastroenterology to discuss his current regimen.  We will begin some empiric antibiotics for possible translocation.  He will remain n.p.o. and on IV fluids.    Discussed with Dr. Hernandez who agrees with the above plan    Page [phone] with any questions  Harshad Castro MD  General Surgery Resident, PGY-4

## 2019-05-26 NOTE — ASSESSMENT & PLAN NOTE
59-year-old gentleman with a history of Crohn's colitis previously failed Humira, currently on Remicade, now presenting with toxic megacolon and dilated transverse colon diameter of 17.4 cm.  Now status post diverting loop ileostomy and transverse colostomy for decompression.    Plan:  -Outpatient office is arranging for approval of Stelara to be administered on an outpatient basis.  QuantiFERON gold testing sent in anticipation of new biologic initiation.  GI office will call patient with scheduling for Stelara.  -TPMT levels have been sent.  Consider addition of Imuran as an outpatient for improved efficacy of therapy.  This will be done as an outpatient.  -Strict avoidance of opioids.  -Lovenox DVT prophylaxis given high risk for thrombosis formation.  -Outpatient follow-up with Dr. Carlson.  -We will sign off for now.  Please call with questions.

## 2019-05-26 NOTE — PROGRESS NOTES
General Surgery Daily Progress Note    Subjective  Patient reports less pain overnight. No flatus with some watery BMs. No N/V.    Objective     Vital signs in last 24 hours:  Temp:  [36.4 °C (97.6 °F)-37.1 °C (98.8 °F)] 37.1 °C (98.8 °F)  Heart Rate:  [] 62  Resp:  [16-22] 18  BP: (100-139)/(64-82) 120/78      Intake/Output Summary (Last 24 hours) at 05/26/19 0714  Last data filed at 05/26/19 0613   Gross per 24 hour   Intake                0 ml   Output              400 ml   Net             -400 ml     Intake/Output this shift:  No intake/output data recorded.    Physical Exam    General appearance: alert, appears stated age and cooperative  Lungs: no increased WOB  Heart: regular rate and rhythm  Abdomen: soft, distended, NT  Extremities: extremities normal, warm and well-perfused; no cyanosis, clubbing, or edema  Skin: Skin color, texture, turgor normal. No rashes or lesions  Neurologic: Grossly normal      Assessment/Plan     59-year-old male with inflammatory bowel disease status unfortunately uncontrolled who presents with abdominal pain and concern due to a 17 cm transverse colon seen on CT scan.      - keep NPO  - IVF  - CTX, flagyl  - AM KUB  - serial abdominal exams  - Lov for dvt ppx  - f/u GI     Page [phone] with any questions  Sonia Jansen MD

## 2019-05-26 NOTE — ANESTHESIA PREPROCEDURE EVALUATION
Anesthesia ROS/MED HX    Anesthesia History - neg  Pulmonary - neg  Neuro/Psych - neg  Cardiovascular- neg  Hematological    anemia (Hgb 8.0 on DOS. )  GI/Hepatic   inflammatory bowel disease (unable to tolerate humira, now on remicaide)  Musculoskeletal- neg  Endo/Other- neg  currently on prednisone for IBD. Highest dose of 30 mg daily over the past year. Recently off of prednisone since 2 days ago.   Chronic abdominal pain    59-year-old male with inflammatory bowel disease status presenting with abdominal pain and concern due to a 17 cm transverse colon seen on CT scan. Now scheduled for laparoscopic colostomy/ileostomy.    History reviewed. No pertinent surgical history.    Patient Active Problem List   Diagnosis   • Generalized abdominal pain   • Crohn's disease of colon with complication (CMS/HCC) (HCC)       Current Facility-Administered Medications   Medication Dose Route Frequency   • ceFAZolin  2 g intravenous 60 min Pre-Op   • cefTRIAXone  1 g intravenous q24h INT   • glucose  16-32 g of dextrose oral PRN    Or   • dextrose  15-30 g of dextrose oral PRN    Or   • glucagon  1 mg intramuscular PRN    Or   • dextrose in water  25 mL intravenous PRN   • enoxaparin  40 mg subcutaneous Daily (6a)   • lactated ringer's   intravenous Continuous   • metroNIDAZOLE  500 mg intravenous q8h INT       Prior to Admission medications    Medication Sig Start Date End Date Taking? Authorizing Provider   predniSONE (DELTASONE) 10 mg tablet Take 30 mg by mouth daily.   Yes Provider, Lefty, MD       CBC Results       05/27/19 05/26/19 05/25/19                    0604 0711 1458         WBC 3.92 4.01 5.26         RBC 3.59 (L) 3.65 (L) 4.39 (L)         HGB 8.0 (L) 8.0 (L) 9.8 (L)         HCT 26.2 (L) 26.7 (L) 32.3 (L)         MCV 73.0 (L) 73.2 (L) 73.6 (L)         MCH 22.3 (L) 21.9 (L) 22.3 (L)         MCHC 30.5 (L) 30.0 (L) 30.3 (L)          335 187         Comment for WBC at 1458 on 05/25/19:  ALL RESULTS HAVE BEEN  CHECKED    Comment for PLT at 0604 on 05/27/19:  PLT CLUMPING SUSPECTED. PLT COUNT MAY BE SLIGHTLY HIGHER THAN REPORTED. IF CLINICALLY WARRANTED, SUGGEST COLLECTING SODIUM CITRATE TUBE (BLUE TOP) IN ADDITION TO EDTA TUBE FOR FOLLOW UP CBC TESTING.    Comment for PLT at 0711 on 05/26/19:  PLT CLUMPING SUSPECTED. PLT COUNT MAY BE SLIGHTLY HIGHER THAN REPORTED. IF CLINICALLY WARRANTED, SUGGEST COLLECTING SODIUM CITRATE TUBE (BLUE TOP) IN ADDITION TO EDTA TUBE FOR FOLLOW UP CBC TESTING.    Comment for PLT at 1458 on 05/25/19:  PLT CLUMPING SUSPECTED. PLT COUNT MAY BE SLIGHTLY HIGHER THAN REPORTED. IF CLINICALLY WARRANTED, SUGGEST COLLECTING SODIUM CITRATE TUBE (BLUE TOP) IN ADDITION TO EDTA TUBE FOR FOLLOW UP CBC TESTING.. CONFIRMED WITH SMEAR ESTIMATE. SPECIMEN QUALITY   CHECKED. RESULTS OBTAINED AFTER VORTEXING TO ELIMINATE PLT CLUMPS          BMP Results       05/27/19 05/26/19 05/25/19                    0604 0711 1458          133 (L) 132 (L)         K 3.7 4.0 3.9         Cl 100 99 95 (L)         CO2 25 28 26         Glucose 71 94 174 (H)         BUN 9 9 12         Creatinine 0.7 (L) 0.7 (L) 0.8         Calcium 7.2 (L) 7.6 (L) 8.1 (L)         Anion Gap 11 6 11         EGFR &gt;60.0 &gt;60.0 &gt;60.0         Comment for K at 1458 on 05/25/19:    Results obtained on plasma. Plasma Potassium values may be up to 0.4 mEQ/L less than serum values. The differences may be greater for patients with high platelet or white cell counts.          No results found for: HCGPREGUR, PREGSERUM, HCG, HCGQUANT      Results from last 7 days  Lab Units 05/27/19  0604   INR INR 1.4   PTT sec 32       ECG 12 lead         X-RAY ABDOMEN 1 VIEW   Final Result   IMPRESSION: Dilated transverse colon with no significant change from the prior   CT      CT ABDOMEN PELVIS WITH IV CONTRAST   Final Result   IMPRESSION:   1.  Marked gaseous dilation of the mid to distal transverse colon up to a   diameter of 17.4 cm. Proximal to this, there is  stool throughout the colon.   Along the distal aspect of the dilated segment and distal to this, there is   diffuse wall thickening from the splenic flecture through the descending and   rectosigmoid colon, consistent with colitis, most likely infectious or   inflammatory, such as Crohn's disease. Given the presence of colitis and the   dilation of the transverse colon, findings are suspicious for toxic megacolon.   2.  Splenomegaly.      Finding:    Other   Acuity: Critical  Status:  CLOSED      Critical read back was performed and results were read back by Garth Green,   5/25/2019 9:14 PM.               Physical Exam    Airway   Mallampati: II   TM distance: >3 FB   Neck ROM: full  Cardiovascular - normal   Rhythm: regular   Rate: normal  Pulmonary - normal   clear to auscultation  Dental - normal        Anesthesia Plan    Plan: general    Technique: general endotracheal     Lines and Monitors: PIV and additional IV     Airway: oral intubation and direct visual laryngoscopy   ASA 3  Blood Products:     Use of Blood Products Discussed: Yes     Consented to blood products  Anesthetic plan and risks discussed with: patient  Induction:    intravenous   Postop Plan:   Patient Disposition: inpatient floor planned admission   Pain Management: IV analgesics  Comments:    Plan: GETA. 2 PIVs. Stress dose steroids (100 mg hydrocortisone)

## 2019-05-27 ENCOUNTER — ANESTHESIA (INPATIENT)
Dept: OPERATING ROOM | Facility: HOSPITAL | Age: 59
DRG: 330 | End: 2019-05-27
Payer: COMMERCIAL

## 2019-05-27 LAB
ABO + RH BLD: NORMAL
ANION GAP SERPL CALC-SCNC: 11 MEQ/L (ref 3–15)
ANISOCYTOSIS BLD QL SMEAR: ABNORMAL
APTT PPP: 32 SEC (ref 23–35)
ATRIAL RATE: 81
BASOPHILS # BLD: 0 K/UL (ref 0.01–0.1)
BASOPHILS NFR BLD: 0 %
BLD GP AB SCN SERPL QL: NEGATIVE
BUN SERPL-MCNC: 9 MG/DL (ref 8–20)
C DIFF STL QL: NEGATIVE
C DIFF STL QL: NORMAL
CALCIUM SERPL-MCNC: 7.2 MG/DL (ref 8.9–10.3)
CHLORIDE SERPL-SCNC: 100 MEQ/L (ref 98–109)
CO2 SERPL-SCNC: 25 MEQ/L (ref 22–32)
CREAT SERPL-MCNC: 0.7 MG/DL
CRP SERPL-MCNC: 166.02 MG/L
D AG BLD QL: POSITIVE
DIFFERENTIAL METHOD BLD: ABNORMAL
DOHLE BOD BLD QL SMEAR: ABNORMAL
EOSINOPHIL # BLD: 0.04 K/UL (ref 0.04–0.54)
EOSINOPHIL NFR BLD: 1 %
ERYTHROCYTE [DISTWIDTH] IN BLOOD BY AUTOMATED COUNT: 16 % (ref 11.6–14.4)
ERYTHROCYTE [SEDIMENTATION RATE] IN BLOOD BY WESTERGREN METHOD: 74 MM/HR
GFR SERPL CREATININE-BSD FRML MDRD: >60 ML/MIN/1.73M*2
GLUCOSE SERPL-MCNC: 71 MG/DL (ref 70–99)
HCT VFR BLDCO AUTO: 26.2 %
HGB BLD-MCNC: 8 G/DL
INR PPP: 1.4 INR
LABORATORY COMMENT REPORT: NORMAL
LYMPHOCYTES # BLD: 1.02 K/UL (ref 1.2–3.5)
LYMPHOCYTES NFR BLD: 26 %
MAGNESIUM SERPL-MCNC: 2.3 MG/DL (ref 1.8–2.5)
MCH RBC QN AUTO: 22.3 PG (ref 28–33.2)
MCHC RBC AUTO-ENTMCNC: 30.5 G/DL (ref 32.2–36.5)
MCV RBC AUTO: 73 FL (ref 83–98)
METAMYELOCYTES # BLD MANUAL: 0.08 K/UL
METAMYELOCYTES NFR BLD MANUAL: 2 %
MICROCYTES BLD QL SMEAR: ABNORMAL
MONOCYTES # BLD: 0.31 K/UL (ref 0.3–1)
MONOCYTES NFR BLD: 8 %
NEUTS BAND # BLD: 0.31 K/UL (ref 0–0.53)
NEUTS BAND # BLD: 2.16 K/UL (ref 1.7–7)
NEUTS BAND NFR BLD: 8 %
NEUTS SEG NFR BLD: 55 %
OVALOCYTES BLD QL SMEAR: ABNORMAL
P AXIS: 27
PDW BLD AUTO: 10.2 FL (ref 9.4–12.4)
PLATELET # BLD AUTO: 221 K/UL
PLATELET # BLD EST: ABNORMAL 10*3/UL
PLATELET CLUMP BLD QL SMEAR: PRESENT
POLYCHROMASIA BLD QL SMEAR: ABNORMAL
POTASSIUM SERPL-SCNC: 3.7 MEQ/L (ref 3.6–5.1)
PR INTERVAL: 134
PROTHROMBIN TIME: 16.6 SEC (ref 12.2–14.5)
QRS DURATION: 88
QT INTERVAL: 332
QTC CALCULATION(BAZETT): 385
R AXIS: -22
RBC # BLD AUTO: 3.59 M/UL (ref 4.5–5.8)
SODIUM SERPL-SCNC: 136 MEQ/L (ref 136–144)
T WAVE AXIS: -12
TOXIC GRANULES BLD QL SMEAR: ABNORMAL
VENTRICULAR RATE: 81
WBC # BLD AUTO: 3.92 K/UL

## 2019-05-27 PROCEDURE — 87077 CULTURE AEROBIC IDENTIFY: CPT | Performed by: STUDENT IN AN ORGANIZED HEALTH CARE EDUCATION/TRAINING PROGRAM

## 2019-05-27 PROCEDURE — 80048 BASIC METABOLIC PNL TOTAL CA: CPT | Performed by: SURGERY

## 2019-05-27 PROCEDURE — 36000014 HC OR LEVEL 4 EA ADDL MIN: Performed by: COLON & RECTAL SURGERY

## 2019-05-27 PROCEDURE — 63600000 HC DRUGS/DETAIL CODE: Performed by: NURSE ANESTHETIST, CERTIFIED REGISTERED

## 2019-05-27 PROCEDURE — 85610 PROTHROMBIN TIME: CPT | Performed by: STUDENT IN AN ORGANIZED HEALTH CARE EDUCATION/TRAINING PROGRAM

## 2019-05-27 PROCEDURE — 36415 COLL VENOUS BLD VENIPUNCTURE: CPT | Performed by: STUDENT IN AN ORGANIZED HEALTH CARE EDUCATION/TRAINING PROGRAM

## 2019-05-27 PROCEDURE — 63700000 HC SELF-ADMINISTRABLE DRUG: Performed by: SURGERY

## 2019-05-27 PROCEDURE — 63600000 HC DRUGS/DETAIL CODE: Performed by: STUDENT IN AN ORGANIZED HEALTH CARE EDUCATION/TRAINING PROGRAM

## 2019-05-27 PROCEDURE — 25000000 HC PHARMACY GENERAL: Performed by: NURSE ANESTHETIST, CERTIFIED REGISTERED

## 2019-05-27 PROCEDURE — 86920 COMPATIBILITY TEST SPIN: CPT

## 2019-05-27 PROCEDURE — 25000000 HC PHARMACY GENERAL: Performed by: COLON & RECTAL SURGERY

## 2019-05-27 PROCEDURE — 93010 ELECTROCARDIOGRAM REPORT: CPT | Performed by: INTERNAL MEDICINE

## 2019-05-27 PROCEDURE — 25000000 HC PHARMACY GENERAL: Performed by: SURGERY

## 2019-05-27 PROCEDURE — 37000001 HC ANESTHESIA GENERAL: Performed by: COLON & RECTAL SURGERY

## 2019-05-27 PROCEDURE — 86901 BLOOD TYPING SEROLOGIC RH(D): CPT

## 2019-05-27 PROCEDURE — 63600000 HC DRUGS/DETAIL CODE: Performed by: SURGERY

## 2019-05-27 PROCEDURE — 85025 COMPLETE CBC W/AUTO DIFF WBC: CPT | Performed by: SURGERY

## 2019-05-27 PROCEDURE — 63600000 HC DRUGS/DETAIL CODE: Mod: JW | Performed by: NURSE ANESTHETIST, CERTIFIED REGISTERED

## 2019-05-27 PROCEDURE — 36000004 HC OR LEVEL 4 INITIAL 30MIN: Performed by: COLON & RECTAL SURGERY

## 2019-05-27 PROCEDURE — 85730 THROMBOPLASTIN TIME PARTIAL: CPT | Performed by: STUDENT IN AN ORGANIZED HEALTH CARE EDUCATION/TRAINING PROGRAM

## 2019-05-27 PROCEDURE — 83735 ASSAY OF MAGNESIUM: CPT | Performed by: SURGERY

## 2019-05-27 PROCEDURE — 27200000 HC STERILE SUPPLY: Performed by: COLON & RECTAL SURGERY

## 2019-05-27 PROCEDURE — 87324 CLOSTRIDIUM AG IA: CPT | Performed by: SURGERY

## 2019-05-27 PROCEDURE — 87449 NOS EACH ORGANISM AG IA: CPT | Performed by: STUDENT IN AN ORGANIZED HEALTH CARE EDUCATION/TRAINING PROGRAM

## 2019-05-27 PROCEDURE — 21400000 HC ROOM AND CARE CCU/INTERMEDIATE

## 2019-05-27 PROCEDURE — 0D1B4Z4 BYPASS ILEUM TO CUTANEOUS, PERCUTANEOUS ENDOSCOPIC APPROACH: ICD-10-PCS | Performed by: COLON & RECTAL SURGERY

## 2019-05-27 PROCEDURE — 99232 SBSQ HOSP IP/OBS MODERATE 35: CPT | Performed by: INTERNAL MEDICINE

## 2019-05-27 PROCEDURE — 86140 C-REACTIVE PROTEIN: CPT | Performed by: STUDENT IN AN ORGANIZED HEALTH CARE EDUCATION/TRAINING PROGRAM

## 2019-05-27 PROCEDURE — 85652 RBC SED RATE AUTOMATED: CPT | Performed by: STUDENT IN AN ORGANIZED HEALTH CARE EDUCATION/TRAINING PROGRAM

## 2019-05-27 PROCEDURE — 44187 LAP ILEO/JEJUNO-STOMY: CPT | Performed by: COLON & RECTAL SURGERY

## 2019-05-27 PROCEDURE — 25800000 HC PHARMACY IV SOLUTIONS: Performed by: NURSE ANESTHETIST, CERTIFIED REGISTERED

## 2019-05-27 RX ORDER — FENTANYL CITRATE 50 UG/ML
50 INJECTION, SOLUTION INTRAMUSCULAR; INTRAVENOUS
Status: DISCONTINUED | OUTPATIENT
Start: 2019-05-27 | End: 2019-05-28

## 2019-05-27 RX ORDER — CELECOXIB 200 MG/1
200 CAPSULE ORAL ONCE
Status: COMPLETED | OUTPATIENT
Start: 2019-05-27 | End: 2019-05-27

## 2019-05-27 RX ORDER — OXYCODONE HYDROCHLORIDE 5 MG/1
10 TABLET ORAL EVERY 4 HOURS PRN
Status: DISCONTINUED | OUTPATIENT
Start: 2019-05-27 | End: 2019-05-30

## 2019-05-27 RX ORDER — SODIUM CHLORIDE, SODIUM LACTATE, POTASSIUM CHLORIDE, CALCIUM CHLORIDE 600; 310; 30; 20 MG/100ML; MG/100ML; MG/100ML; MG/100ML
INJECTION, SOLUTION INTRAVENOUS CONTINUOUS
Status: DISCONTINUED | OUTPATIENT
Start: 2019-05-27 | End: 2019-05-28

## 2019-05-27 RX ORDER — LIDOCAINE HCL/PF 100 MG/5ML
SYRINGE (ML) INTRAVENOUS AS NEEDED
Status: DISCONTINUED | OUTPATIENT
Start: 2019-05-27 | End: 2019-05-27 | Stop reason: SURG

## 2019-05-27 RX ORDER — PROPOFOL 10 MG/ML
INJECTION, EMULSION INTRAVENOUS AS NEEDED
Status: DISCONTINUED | OUTPATIENT
Start: 2019-05-27 | End: 2019-05-27 | Stop reason: SURG

## 2019-05-27 RX ORDER — GLYCOPYRROLATE 0.6MG/3ML
SYRINGE (ML) INTRAVENOUS AS NEEDED
Status: DISCONTINUED | OUTPATIENT
Start: 2019-05-27 | End: 2019-05-27 | Stop reason: SURG

## 2019-05-27 RX ORDER — ALVIMOPAN 12 MG/1
12 CAPSULE ORAL ONCE
Status: COMPLETED | OUTPATIENT
Start: 2019-05-27 | End: 2019-05-27

## 2019-05-27 RX ORDER — EPHEDRINE SULFATE 50 MG/ML
INJECTION, SOLUTION INTRAVENOUS AS NEEDED
Status: DISCONTINUED | OUTPATIENT
Start: 2019-05-27 | End: 2019-05-27 | Stop reason: SURG

## 2019-05-27 RX ORDER — ACETAMINOPHEN 325 MG/1
975 TABLET ORAL ONCE
Status: COMPLETED | OUTPATIENT
Start: 2019-05-27 | End: 2019-05-27

## 2019-05-27 RX ORDER — ONDANSETRON HYDROCHLORIDE 2 MG/ML
INJECTION, SOLUTION INTRAVENOUS AS NEEDED
Status: DISCONTINUED | OUTPATIENT
Start: 2019-05-27 | End: 2019-05-27 | Stop reason: SURG

## 2019-05-27 RX ORDER — ROCURONIUM BROMIDE 10 MG/ML
INJECTION, SOLUTION INTRAVENOUS AS NEEDED
Status: DISCONTINUED | OUTPATIENT
Start: 2019-05-27 | End: 2019-05-27 | Stop reason: SURG

## 2019-05-27 RX ORDER — FENTANYL CITRATE 50 UG/ML
INJECTION, SOLUTION INTRAMUSCULAR; INTRAVENOUS AS NEEDED
Status: DISCONTINUED | OUTPATIENT
Start: 2019-05-27 | End: 2019-05-27 | Stop reason: SURG

## 2019-05-27 RX ORDER — GABAPENTIN 300 MG/1
600 CAPSULE ORAL ONCE
Status: COMPLETED | OUTPATIENT
Start: 2019-05-27 | End: 2019-05-27

## 2019-05-27 RX ORDER — SODIUM CHLORIDE, SODIUM LACTATE, POTASSIUM CHLORIDE, CALCIUM CHLORIDE 600; 310; 30; 20 MG/100ML; MG/100ML; MG/100ML; MG/100ML
INJECTION, SOLUTION INTRAVENOUS CONTINUOUS
Status: DISCONTINUED | OUTPATIENT
Start: 2019-05-27 | End: 2019-05-27

## 2019-05-27 RX ORDER — NEOSTIGMINE METHYLSULFATE 1 MG/ML
INJECTION INTRAVENOUS AS NEEDED
Status: DISCONTINUED | OUTPATIENT
Start: 2019-05-27 | End: 2019-05-27 | Stop reason: SURG

## 2019-05-27 RX ORDER — ACETAMINOPHEN 325 MG/1
650 TABLET ORAL EVERY 6 HOURS
Status: DISCONTINUED | OUTPATIENT
Start: 2019-05-27 | End: 2019-05-30 | Stop reason: HOSPADM

## 2019-05-27 RX ORDER — OXYCODONE HYDROCHLORIDE 5 MG/1
5 TABLET ORAL EVERY 4 HOURS PRN
Status: DISCONTINUED | OUTPATIENT
Start: 2019-05-27 | End: 2019-05-30

## 2019-05-27 RX ORDER — HYDROMORPHONE HYDROCHLORIDE 1 MG/ML
0.4 INJECTION, SOLUTION INTRAMUSCULAR; INTRAVENOUS; SUBCUTANEOUS
Status: DISCONTINUED | OUTPATIENT
Start: 2019-05-27 | End: 2019-05-29

## 2019-05-27 RX ORDER — SODIUM CHLORIDE, SODIUM GLUCONATE, SODIUM ACETATE, POTASSIUM CHLORIDE AND MAGNESIUM CHLORIDE 30; 37; 368; 526; 502 MG/100ML; MG/100ML; MG/100ML; MG/100ML; MG/100ML
INJECTION, SOLUTION INTRAVENOUS CONTINUOUS PRN
Status: DISCONTINUED | OUTPATIENT
Start: 2019-05-27 | End: 2019-05-27 | Stop reason: SURG

## 2019-05-27 RX ORDER — ONDANSETRON HYDROCHLORIDE 2 MG/ML
4 INJECTION, SOLUTION INTRAVENOUS
Status: DISCONTINUED | OUTPATIENT
Start: 2019-05-27 | End: 2019-05-30 | Stop reason: HOSPADM

## 2019-05-27 RX ORDER — MIDAZOLAM HYDROCHLORIDE 2 MG/2ML
INJECTION, SOLUTION INTRAMUSCULAR; INTRAVENOUS AS NEEDED
Status: DISCONTINUED | OUTPATIENT
Start: 2019-05-27 | End: 2019-05-27 | Stop reason: SURG

## 2019-05-27 RX ORDER — HYDROMORPHONE HYDROCHLORIDE 2 MG/ML
0.5 INJECTION, SOLUTION INTRAMUSCULAR; INTRAVENOUS; SUBCUTANEOUS
Status: DISCONTINUED | OUTPATIENT
Start: 2019-05-27 | End: 2019-05-28

## 2019-05-27 RX ORDER — BUPIVACAINE HYDROCHLORIDE 5 MG/ML
INJECTION, SOLUTION EPIDURAL; INTRACAUDAL AS NEEDED
Status: DISCONTINUED | OUTPATIENT
Start: 2019-05-27 | End: 2019-05-27 | Stop reason: HOSPADM

## 2019-05-27 RX ADMIN — SODIUM CHLORIDE, SODIUM GLUCONATE, SODIUM ACETATE, POTASSIUM CHLORIDE AND MAGNESIUM CHLORIDE: 526; 502; 368; 37; 30 INJECTION, SOLUTION INTRAVENOUS at 10:32

## 2019-05-27 RX ADMIN — ROCURONIUM BROMIDE 50 MG: 10 INJECTION, SOLUTION INTRAVENOUS at 10:39

## 2019-05-27 RX ADMIN — EPHEDRINE SULFATE 10 MG: 50 INJECTION INTRAVENOUS at 10:39

## 2019-05-27 RX ADMIN — ACETAMINOPHEN 650 MG: 325 TABLET ORAL at 23:05

## 2019-05-27 RX ADMIN — FENTANYL CITRATE 100 MCG: 50 INJECTION, SOLUTION INTRAMUSCULAR; INTRAVENOUS at 10:39

## 2019-05-27 RX ADMIN — METRONIDAZOLE 500 MG: 500 INJECTION, SOLUTION INTRAVENOUS at 17:09

## 2019-05-27 RX ADMIN — MIDAZOLAM HYDROCHLORIDE 2 MG: 1 INJECTION, SOLUTION INTRAMUSCULAR; INTRAVENOUS at 10:32

## 2019-05-27 RX ADMIN — SODIUM CHLORIDE, POTASSIUM CHLORIDE, SODIUM LACTATE AND CALCIUM CHLORIDE: 600; 310; 30; 20 INJECTION, SOLUTION INTRAVENOUS at 22:59

## 2019-05-27 RX ADMIN — HYDROCORTISONE SODIUM SUCCINATE 100 MG: 100 INJECTION, POWDER, FOR SOLUTION INTRAMUSCULAR; INTRAVENOUS at 10:50

## 2019-05-27 RX ADMIN — GLYCOPYRROLATE 0.8 MG: 0.2 INJECTION INTRAMUSCULAR; INTRAVENOUS at 12:38

## 2019-05-27 RX ADMIN — FENTANYL CITRATE 100 MCG: 50 INJECTION, SOLUTION INTRAMUSCULAR; INTRAVENOUS at 11:01

## 2019-05-27 RX ADMIN — ALVIMOPAN 12 MG: 12 CAPSULE ORAL at 09:49

## 2019-05-27 RX ADMIN — ACETAMINOPHEN 650 MG: 325 TABLET ORAL at 17:09

## 2019-05-27 RX ADMIN — PROPOFOL 120 MG: 10 INJECTION, EMULSION INTRAVENOUS at 10:39

## 2019-05-27 RX ADMIN — SODIUM CHLORIDE, POTASSIUM CHLORIDE, SODIUM LACTATE AND CALCIUM CHLORIDE: 600; 310; 30; 20 INJECTION, SOLUTION INTRAVENOUS at 15:51

## 2019-05-27 RX ADMIN — ACETAMINOPHEN 975 MG: 325 TABLET ORAL at 09:49

## 2019-05-27 RX ADMIN — CELECOXIB 200 MG: 200 CAPSULE ORAL at 09:49

## 2019-05-27 RX ADMIN — Medication 5 MG: at 12:38

## 2019-05-27 RX ADMIN — LIDOCAINE HYDROCHLORIDE 100 MG: 20 INJECTION, SOLUTION INTRAVENOUS at 10:39

## 2019-05-27 RX ADMIN — METRONIDAZOLE 500 MG: 500 INJECTION, SOLUTION INTRAVENOUS at 06:12

## 2019-05-27 RX ADMIN — SODIUM CHLORIDE, POTASSIUM CHLORIDE, SODIUM LACTATE AND CALCIUM CHLORIDE: 600; 310; 30; 20 INJECTION, SOLUTION INTRAVENOUS at 03:42

## 2019-05-27 RX ADMIN — ONDANSETRON 4 MG: 2 INJECTION INTRAMUSCULAR; INTRAVENOUS at 12:38

## 2019-05-27 RX ADMIN — CEFTRIAXONE SODIUM 1 G: 1 INJECTION, POWDER, FOR SOLUTION INTRAVENOUS at 22:59

## 2019-05-27 RX ADMIN — GABAPENTIN 600 MG: 300 CAPSULE ORAL at 09:50

## 2019-05-27 NOTE — PROGRESS NOTES
"Colorectal Surgery Post-Operative Note    Layton Ritchie Jr. is a 59 y.o. male s/p laparoscopic diverting ileostomy and decompressive transverse colostomy today.     S  Patient denies fever/chills, nausea/emesis, chest pain, SOB, abdominal pain. Patient has had gas, stool from his ostomies.    O  Vitals:  Blood pressure 102/63, pulse 69, temperature 36.1 °C (97 °F), temperature source Temporal, resp. rate 18, height 1.727 m (5' 8\"), weight 73 kg (161 lb), SpO2 96 %.    Physical Exam   Constitutional: He is oriented to person, place, and time. No distress.   Cardiovascular: Normal rate and regular rhythm.    Pulmonary/Chest: Effort normal. No respiratory distress.   Abdominal: Soft. He exhibits no distension. There is no tenderness (appropriate incisional tenderness). There is no rebound and no guarding.   Transverse colostomy with gas, stool in bag  Diverting ileostomy with thin bilious liquid, some gas in bag, stoma healthy appearing   Neurological: He is alert and oriented to person, place, and time.   Skin: Skin is warm and dry. He is not diaphoretic.   Nursing note and vitals reviewed.       A/P  59M s/p lap divert ileostomy and decomp trans colostomy on 5/27/19    - Diet: Clear liquids  - DVT PPx: Lovenox  - IVF:   - Pain: tylenol, gabapentin, toradol, tramadol  - Abx: CTX, flagyl  - f/u pain ctrl, return of bowel function    Jovon Barnard MD  Please page *[phone] with questions or concerns.  5/27/2019    "

## 2019-05-27 NOTE — OR SURGEON
Pre-Procedure patient identification:  I am the primary operating surgeon/proceduralist and I have identified the patient on 05/27/19 at 10:25 AM James Hernandez MD  Phone Number: [phone]

## 2019-05-27 NOTE — ANESTHESIA POSTPROCEDURE EVALUATION
Patient: Layton Rtichie Jr.    Procedure Summary     Date:  05/27/19 Room / Location:  [facility] OR 6 / [facility] OR    Anesthesia Start:  1032 Anesthesia Stop:  1303    Procedure:  Colostomy/Ileostomy Laparoscopic (N/A ) Diagnosis:       Ulcerative pancolitis with other complication (CMS/HCC)      (Ulcerative pancolitis with other complication (CMS/HCC) [K51.018])    Surgeon:  James Hernandez MD Responsible Provider:  Mike Velazquez MD    Anesthesia Type:  general ASA Status:  3          Anesthesia Type: general  PACU Vitals  5/27/2019 1250 - 5/27/2019 1350      5/27/2019 1301 5/27/2019 1315 5/27/2019 1330       BP: 100/60 (!)  99/59 101/63     Temp: 36.6 °C (97.8 °F) 36.4 °C (97.5 °F) 36.4 °C (97.6 °F)     Pulse: 63 (!)  58 (!)  57     Resp: 12 14 14     SpO2: - 100 % 100 %             Anesthesia Post Evaluation    Pain management: satisfactory to patient  Mode of pain management: IV medication  Patient location during evaluation: ICU  Patient participation: complete - patient participated  Level of consciousness: awake and alert  Cardiovascular status: acceptable and hemodynamically stable  Airway Patency: adequate  Respiratory status: acceptable  Hydration status: stable  Anesthetic complications: no

## 2019-05-27 NOTE — PLAN OF CARE
Problem: Patient Care Overview  Goal: Plan of Care Review  Outcome: Ongoing (interventions implemented as appropriate)   05/27/19 0302   Coping/Psychosocial   Plan Of Care Reviewed With patient;spouse   Plan of Care Review   Progress improving   Outcome Summary VSS. Patient slept throughout night. Patient to go to OR 5/27 at 0900.      Goal: Individualization & Mutuality  Outcome: Ongoing (interventions implemented as appropriate)   05/27/19 0302   Individualization   Patient Specific Preferences Wife likes to to stay at bedside   Patient Specific Goals Pain management   Patient Specific Interventions Patient states pain is adequately managed   Mutuality/Individual Preferences   What Anxieties, Fears, Concerns, or Questions Do You Have About Your Care? Denies   Is There Anything About Yourself or Those You Care About That Would Help Us Take Better Care of You? Denies    Mutuality   What Questions Do You Have About Your Health or Care? Denies       Problem: Pressure Ulcer Risk (Phil Scale) (Adult,Obstetrics,Pediatric)  Goal: Skin Integrity  Outcome: Ongoing (interventions implemented as appropriate)   05/27/19 0302   Pressure Ulcer Risk (Phil Scale) (Adult,Obstetrics,Pediatric)   Skin Integrity making progress toward outcome       Problem: Fall Risk (Adult)  Goal: Absence of Falls  Outcome: Ongoing (interventions implemented as appropriate)   05/27/19 0302   Fall Risk (Adult)   Absence of Falls making progress toward outcome       Problem: Pain, Acute (Adult)  Goal: Acceptable Pain Control/Comfort Level  Outcome: Ongoing (interventions implemented as appropriate)   05/27/19 0302   Pain, Acute (Adult)   Acceptable Pain Control/Comfort Level making progress toward outcome

## 2019-05-27 NOTE — PLAN OF CARE
Problem: Patient Care Overview  Goal: Plan of Care Review  Outcome: Ongoing (interventions implemented as appropriate)   05/27/19 1610   Coping/Psychosocial   Plan Of Care Reviewed With patient   Plan of Care Review   Progress improving   Outcome Summary patient returned from surgery. vss, tolerating clears, denies pain incisions and ostomy intact castrejon intact      Goal: Individualization & Mutuality  Outcome: Ongoing (interventions implemented as appropriate)      Problem: Pressure Ulcer Risk (Phil Scale) (Adult,Obstetrics,Pediatric)  Goal: Skin Integrity  Outcome: Ongoing (interventions implemented as appropriate)      Problem: Fall Risk (Adult)  Goal: Absence of Falls  Outcome: Ongoing (interventions implemented as appropriate)      Problem: Pain, Acute (Adult)  Goal: Acceptable Pain Control/Comfort Level  Outcome: Ongoing (interventions implemented as appropriate)      Problem: Bowel Disease, Inflammatory (Adult)  Goal: Signs and Symptoms of Listed Potential Problems Will be Absent, Minimized or Managed (Bowel Disease, Inflammatory)  Outcome: Outcome(s) Achieved Date Met: 05/27/19      Problem: Infection, Risk/Actual (Adult)  Goal: Identify Related Risk Factors and Signs and Symptoms  Outcome: Outcome(s) Achieved Date Met: 05/27/19    Goal: Infection Prevention/Resolution  Outcome: Ongoing (interventions implemented as appropriate)

## 2019-05-27 NOTE — OP NOTE
Colostomy/Ileostomy Laparoscopic Procedure Note    Hospital: [facility]  Medical Record Number:  [id number]  Patient Name:  Layton Ritchie Jr.  YOB: 1960   Date of Operation:  5/25/2019 - 5/27/2019    Procedure:    Colostomy/Ileostomy Laparoscopic  CPT(R) Code:  87699 - [facility] LAP, SURG COLOSTOMY    Laparoscopic loop ileostomy    Anorectal exam under anesthesia      Pre-op Diagnosis     * Ulcerative pancolitis with other complication (CMS/HCC) [K51.018]       Post-op Diagnosis     * Ulcerative pancolitis with other complication (CMS/HCC) [K51.018]    Surgeon(s) and Role:     * Eduardo Hendricks DO - Resident - Assisting     * James Hernandez MD - Primary    Anesthesia: General    Staff:   Circulator: Hue Richards RN  Scrub Person: Monty Sutton     Clinical History: This 59-year-old gentleman has severe Crohn's colitis and now presents with megacolon and severe colitis symptoms.  He has Remicade antibodies and subtherapeutic serum levels.  Dr. Carlson plans to switch him to a different biologic, Stelara.  He presents now for diversion with loop ileostomy and blowhole colostomy.  If his colon heals on Stelara, we can reverse the stoma's.  If not, he will need completion proctocolectomy and conversion to end ileostomy.    Findings: Ulcerated anal canal, particularly posterior.  He has deep penetrating ulcers in the rectum.  I was not able to find any fistula to the perianal skin.  Intra-abdominally, the left colon had creeping fat and significant thickening particularly in the area of the descending colon.  The transverse colon was significantly dilated.  The small bowel was run from the ileocecal valve to the ligament of Treitz and no obvious Crohn's disease could be identified of the small bowel.    Procedure Details   The patient was placed in lithotomy position on the operating table after general anesthesia was induced.  The perianal area was prepped with Betadine and draped  sterilely.  The anus and distal rectum was examined using anal retractors and the above findings were noted.  There was severe proctitis with deep discrete ulcerations there was no evidence of fistula to the perianal skin.  There was ulceration in the anal canal, particularly posteriorly.  He has an enlarged right posterior hemorrhoid.    The abdomen was then prepped with ChloraPrep and draped sterilely.  A vertical incision umbilicus is been the perineal cavity was entered.  Stay sutures were placed and the Johnson trocar was inserted.  Two 5 mm ports were placed in left abdomen.  All the port sites and the stoma sites were infiltrated with half percent Marcaine.  The bowel was examined as noted and run from the ligament of Treitz to the ileocecal valve.  There was no evidence of obvious Crohn's disease of the small bowel.  The terminal ileum was identified and marked proximally and distally and then an umbilical tape placed under the mesentery.  We then created a trephine through the right rectus muscle and brought the ileum out for loop ileostomy.  We confirmed proper orientation of the ileum without twisting.  The abdomen was deflated.  A trephine was then created through the left rectus muscle in the upper abdomen over the distended transverse colon.  The serosa of the colon was sutured to the posterior rectus sheath circumferentially.  The umbilical fascia was closed with a figure-of-eight suture of 0 PDS and the skin incisions closed with 4-0 Vicryl subcuticular sutures and Dermabond.    The loop ileostomy was then matured in standard everting fashion to the dermis with interrupted 3-0 chromic sutures over a Taberg chao.  The transverse colon was then opened and a large amount of gas was expressed and the abdomen deflated.  The mucosa was then sutured circumferentially to the dermis with interrupted 3-0 Vicryl.  Stoma appliances were placed on both stomas.    Estimated Blood Loss: No blood loss  documented.    Specimens:                  Order Name Source Comment Collection Info Order Time   PREPARE RBC    5/27/2019  7:55 AM    Transfusion indications  Pre-OP major surgery with bleeding risk       Has consent been obtained?  Yes            Drains:      Implants: * No implants in log *           Complications:  None; patient tolerated the procedure well.           Disposition: PACU - hemodynamically stable.           Condition: stable    James Hernandez MD  Phone Number: [phone]

## 2019-05-27 NOTE — CONSULTS
"Brief Nutrition Note    Recommendations   1. Advance diet as tolerated to low fiber    Nutrition Charting Type: Nutrition Brief Assessment    Clinical Course: Patient is a 59 y.o. male who was admitted on 5/25/2019 with a diagnosis of Generalized abdominal pain [R10.84]  Abnormal CT of the abdomen [R93.5]  Bandemia [D72.825]  Anemia, unspecified type [D64.9]  Diarrhea, unspecified type [R19.7].     History reviewed. No pertinent past medical history.  History reviewed. No pertinent surgical history.    General Information  Nutrition Evaluation/Patient Follow-Up: Mildly Nutritionally Compromised-Follow up in 4-6 days  Reason for Consult: RN screening     [facility] Nutrition Screen Tool  Has patient lost weight without trying?: 0-->Yes  If yes,how much weight has been lost?: 4-->34 or more pounds  Has patient been eating poorly due to decreased appetite?: 0-->No  [facility] Nutrition Screen Score: 4  Identified At Risk By Screening Criteria: Skin breakdown or Wound Stage 2 or greater     Physical Findings  Additional Documentation: Physical Appearance (Group)     Physical Appearance  Last Bowel Movement: 05/26/19  Skin:  (no edema, skin intact)     Nutrition Order Review  Nutrition Order Review: does not meet nutritional requirements  Nutrition Order Review Comments: npo for OR     Anthropometrics  Height: 172.7 cm (5' 8\")     Current Weight  Weight Method: Bed scale  Weight: 73 kg (161 lb)     Ideal Body Weight (IBW)  Ideal Body Weight (IBW) (kg): 70.89  % Ideal Body Weight: 102.38     Body Mass Index (BMI)  BMI (Calculated): 24.5      Lab Results  Lab Results Reviewed: reviewed, pertinent   Lab Results   Component Value Date    GLUCOSE 71 05/27/2019    CALCIUM 7.2 (L) 05/27/2019     05/27/2019    K 3.7 05/27/2019    CO2 25 05/27/2019     05/27/2019    BUN 9 05/27/2019    CREATININE 0.7 (L) 05/27/2019     Pertinent Medications  Pertinent Medications Reviewed: reviewed, pertinent   Ancef, rocephin, LR @ 125 ml/hr, " flagyl    Clinical comments: MST received for wt loss (>34lbs). Pt in OR for loop ileostomy and blowhole colostomy 2/2 crohn's disease. Would advance diet as tolerated to low fiber. Will follow up to investigate MST for wt loss. Also will assess need for diet and hydration education of ileostomy.     Goals: diet advancement post op within 48 hours  Monitor: po intake, GI function, labs    Recommendations: See above       Date: 05/27/19  Signature: Cesilia Terry RD

## 2019-05-27 NOTE — ANESTHESIA PROCEDURE NOTES
Airway  Urgency: elective    Start Time: 5/27/2019 10:44 AM  Airway not difficult    General Information and Staff    Patient location during procedure: OR  Anesthesiologist: CRISPIN CHANG  Resident/CRNA: LUISA BUCKNER  Performed: resident/CRNA     Indications and Patient Condition  Indications for airway management: anesthesia  Sedation level: deep  Preoxygenated: yes  Patient position: sniffing  Mask difficulty assessment: 1 - vent by mask    Final Airway Details  Final airway type: endotracheal airway      Successful airway: ETT  Cuffed: yes   Successful intubation technique: direct laryngoscopy  Facilitating devices/methods: intubating stylet  Endotracheal tube insertion site: oral  Blade: Agnes  Blade size: #3  ETT size: 7.5 mm  Cormack-Lehane Classification: grade IIa - partial view of glottis  Placement verified by: chest auscultation and capnometry   Measured from: lips  ETT to lips (cm): 23  Number of attempts at approach: 1  Atraumatic airway insertion

## 2019-05-27 NOTE — PROGRESS NOTES
Gastroenterology  Daily Progress Note       SUBJECTIVE   This is a 59 y.o. year-old male admitted on 5/25/2019 with Generalized abdominal pain [R10.84]  Abnormal CT of the abdomen [R93.5]  Bandemia [D72.825]  Anemia, unspecified type [D64.9]  Diarrhea, unspecified type [R19.7].    Interval History: Patient reports unchanged clinical status.  Appears relatively comfortable, no worsening abdominal pain compared to yesterday.  Plan for OR later today.     OBJECTIVE      Vital signs in last 24 hours:  Temp:  [35.8 °C (96.5 °F)-37.9 °C (100.2 °F)] 36.7 °C (98 °F)  Heart Rate:  [49-98] 68  Resp:  [16-18] 16  BP: ()/(64-69) 108/65    Intake/Output Summary (Last 24 hours) at 05/27/19 1150  Last data filed at 05/27/19 1114   Gross per 24 hour   Intake           4262.5 ml   Output             1151 ml   Net           3111.5 ml       PHYSICAL EXAMINATION      Physical Exam  Constitutional: He is oriented to person, place, and time. No distress.   Cardiovascular: Normal rate and regular rhythm.  Exam reveals no friction rub.    No murmur heard.  Pulmonary/Chest: Effort normal and breath sounds normal. No respiratory distress. He has no wheezes.   Abdominal:   Abdomen soft, distended, tympanic to percussion, tenderness to palpation particularly in the left lower quadrant   LABS / IMAGING / TELE      Labs  I have reviewed the patient's labs to the time of note. No new clinical concern.    Results from last 7 days  Lab Units 05/27/19  0604 05/26/19  0711 05/25/19  1458   SODIUM mEQ/L 136 133* 132*   POTASSIUM mEQ/L 3.7 4.0 3.9   CHLORIDE mEQ/L 100 99 95*   CO2 mEQ/L 25 28 26   BUN mg/dL 9 9 12   CREATININE mg/dL 0.7* 0.7* 0.8   CALCIUM mg/dL 7.2* 7.6* 8.1*   ALBUMIN g/dL  --   --  2.0*   BILIRUBIN TOTAL mg/dL  --   --  0.6   ALK PHOS IU/L  --   --  58   ALT IU/L  --   --  23   AST IU/L  --   --  14*   GLUCOSE mg/dL 71 94 174*       Results from last 7 days  Lab Units 05/27/19  0604   MAGNESIUM mg/dL 2.3       Results from  last 7 days  Lab Units 05/27/19  0604 05/26/19  0711 05/25/19  1458   WBC K/uL 3.92 4.01 5.26   HEMOGLOBIN g/dL 8.0* 8.0* 9.8*   HEMATOCRIT % 26.2* 26.7* 32.3*   PLATELETS K/uL 221 335 187   DIFF TYPE  Manu Manu Manu   NEUTROS PCT MAN % 55 58 61   LYMPHO PCT MAN % 26 32 16   MONO PCT MAN % 8 7 3   EOSINO PCT MAN % 1 0 0   BASOS PCT MAN % 0 0 0   BANDS PCT MAN % 8 3 20*   SEGS ABS MAN K/uL 2.16 2.33 3.21   LYMPHO ABS MAN K/uL 1.02* 1.28 0.84*   MONO ABS MAN K/uL 0.31 0.28* 0.16*   EOS ABS MAN K/uL 0.04 0.00* 0.00*   BASOS ABS MAN K/uL 0.00* 0.00* 0.00*         Imaging  Not applicable    ASSESSMENT AND PLAN      Crohn's disease of colon with complication (CMS/[facility]) ([facility])   Assessment & Plan    59-year-old gentleman with a history of Crohn's colitis previously failed Humira, currently on Remicade, now presenting with toxic megacolon and dilated transverse colon diameter of 17.4 cm.  The patient does have a low albumin level of 2.0 and bandemia despite normal white blood cell count which can be seen with toxic megacolon.  He clinically appears much more stable and comfortable than his imaging findings would suggest.     Plan:  -C. difficile and stool studies pending.  -CRP elevated to 166.  -We will follow-up status post surgical intervention.  -We will make arrangements for Stelara administration.  I have discussed this with the inpatient pharmacy; we will need to make arrangements with specialty pharmacy for delivery of drug to the hospital for inpatient administration. As the patient has antibodies and low drug levels with Remicade, we will discontinue the use of this agent and switch to an alternate class of biologic therapy.  -Hold on additional steroid administration at this point.  -Strict avoidance of opioids.  -Lovenox DVT prophylaxis given high risk for thrombosis formation.          VTE Assessment: Padua    Code Status: Full Code  Estimated discharge date: 5/29/2019

## 2019-05-28 LAB
ANION GAP SERPL CALC-SCNC: 5 MEQ/L (ref 3–15)
ANISOCYTOSIS BLD QL SMEAR: ABNORMAL
BASOPHILS # BLD: 0.01 K/UL (ref 0.01–0.1)
BASOPHILS NFR BLD: 0.2 %
BUN SERPL-MCNC: 6 MG/DL (ref 8–20)
CALCIUM SERPL-MCNC: 7.2 MG/DL (ref 8.9–10.3)
CHLORIDE SERPL-SCNC: 97 MEQ/L (ref 98–109)
CO2 SERPL-SCNC: 29 MEQ/L (ref 22–32)
CREAT SERPL-MCNC: 0.6 MG/DL
DIFFERENTIAL METHOD BLD: ABNORMAL
EOSINOPHIL # BLD: 0.01 K/UL (ref 0.04–0.54)
EOSINOPHIL NFR BLD: 0.2 %
ERYTHROCYTE [DISTWIDTH] IN BLOOD BY AUTOMATED COUNT: 15.8 % (ref 11.6–14.4)
GFR SERPL CREATININE-BSD FRML MDRD: >60 ML/MIN/1.73M*2
GLUCOSE SERPL-MCNC: 184 MG/DL (ref 70–99)
HCT VFR BLDCO AUTO: 27 %
HGB BLD-MCNC: 8 G/DL
IMM GRANULOCYTES # BLD AUTO: 0.03 K/UL (ref 0–0.08)
IMM GRANULOCYTES NFR BLD AUTO: 0.7 %
LYMPHOCYTES # BLD: 1.1 K/UL (ref 1.2–3.5)
LYMPHOCYTES NFR BLD: 25.5 %
MAGNESIUM SERPL-MCNC: 2 MG/DL (ref 1.8–2.5)
MCH RBC QN AUTO: 22.2 PG (ref 28–33.2)
MCHC RBC AUTO-ENTMCNC: 29.6 G/DL (ref 32.2–36.5)
MCV RBC AUTO: 75 FL (ref 83–98)
MONOCYTES # BLD: 0.24 K/UL (ref 0.3–1)
MONOCYTES NFR BLD: 5.6 %
NEUTROPHILS # BLD: 2.92 K/UL (ref 1.7–7)
NEUTS SEG NFR BLD: 67.8 %
NRBC BLD-RTO: 0 %
OVALOCYTES BLD QL SMEAR: ABNORMAL
PDW BLD AUTO: 9 FL (ref 9.4–12.4)
PLATELET # BLD AUTO: ABNORMAL K/UL
PLATELET # BLD EST: ABNORMAL 10*3/UL
PLATELET CLUMP BLD QL SMEAR: PRESENT
POLYCHROMASIA BLD QL SMEAR: ABNORMAL
POTASSIUM SERPL-SCNC: 3.9 MEQ/L (ref 3.6–5.1)
RBC # BLD AUTO: 3.6 M/UL (ref 4.5–5.8)
SODIUM SERPL-SCNC: 131 MEQ/L (ref 136–144)
WBC # BLD AUTO: 4.31 K/UL

## 2019-05-28 PROCEDURE — 80048 BASIC METABOLIC PNL TOTAL CA: CPT | Performed by: SURGERY

## 2019-05-28 PROCEDURE — 63700000 HC SELF-ADMINISTRABLE DRUG: Performed by: PHYSICIAN ASSISTANT

## 2019-05-28 PROCEDURE — 25000000 HC PHARMACY GENERAL: Performed by: SURGERY

## 2019-05-28 PROCEDURE — 21400000 HC ROOM AND CARE CCU/INTERMEDIATE

## 2019-05-28 PROCEDURE — 63600000 HC DRUGS/DETAIL CODE: Performed by: SURGERY

## 2019-05-28 PROCEDURE — 83735 ASSAY OF MAGNESIUM: CPT | Performed by: SURGERY

## 2019-05-28 PROCEDURE — 99024 POSTOP FOLLOW-UP VISIT: CPT | Performed by: COLON & RECTAL SURGERY

## 2019-05-28 PROCEDURE — 36415 COLL VENOUS BLD VENIPUNCTURE: CPT | Performed by: SURGERY

## 2019-05-28 PROCEDURE — 63700000 HC SELF-ADMINISTRABLE DRUG: Performed by: SURGERY

## 2019-05-28 PROCEDURE — 25800000 HC PHARMACY IV SOLUTIONS: Performed by: STUDENT IN AN ORGANIZED HEALTH CARE EDUCATION/TRAINING PROGRAM

## 2019-05-28 PROCEDURE — 85025 COMPLETE CBC W/AUTO DIFF WBC: CPT | Performed by: SURGERY

## 2019-05-28 PROCEDURE — 25800000 HC PHARMACY IV SOLUTIONS: Performed by: SURGERY

## 2019-05-28 RX ORDER — POTASSIUM CHLORIDE 750 MG/1
20 TABLET, FILM COATED, EXTENDED RELEASE ORAL ONCE
Status: COMPLETED | OUTPATIENT
Start: 2019-05-28 | End: 2019-05-28

## 2019-05-28 RX ADMIN — POTASSIUM CHLORIDE 20 MEQ: 750 TABLET, FILM COATED, EXTENDED RELEASE ORAL at 11:49

## 2019-05-28 RX ADMIN — METRONIDAZOLE 500 MG: 500 INJECTION, SOLUTION INTRAVENOUS at 02:10

## 2019-05-28 RX ADMIN — CEFTRIAXONE SODIUM 1 G: 1 INJECTION, POWDER, FOR SOLUTION INTRAVENOUS at 21:40

## 2019-05-28 RX ADMIN — ACETAMINOPHEN 650 MG: 325 TABLET ORAL at 11:49

## 2019-05-28 RX ADMIN — ACETAMINOPHEN 650 MG: 325 TABLET ORAL at 17:09

## 2019-05-28 RX ADMIN — METRONIDAZOLE 500 MG: 500 INJECTION, SOLUTION INTRAVENOUS at 09:01

## 2019-05-28 RX ADMIN — SODIUM CHLORIDE 500 ML: 9 INJECTION, SOLUTION INTRAVENOUS at 23:08

## 2019-05-28 RX ADMIN — ACETAMINOPHEN 650 MG: 325 TABLET ORAL at 05:48

## 2019-05-28 RX ADMIN — METRONIDAZOLE 500 MG: 500 INJECTION, SOLUTION INTRAVENOUS at 17:10

## 2019-05-28 RX ADMIN — SODIUM CHLORIDE 500 ML: 9 INJECTION, SOLUTION INTRAVENOUS at 18:33

## 2019-05-28 RX ADMIN — SODIUM CHLORIDE, POTASSIUM CHLORIDE, SODIUM LACTATE AND CALCIUM CHLORIDE: 600; 310; 30; 20 INJECTION, SOLUTION INTRAVENOUS at 05:49

## 2019-05-28 NOTE — PLAN OF CARE
Problem: Patient Care Overview  Goal: Plan of Care Review  Outcome: Ongoing (interventions implemented as appropriate)   05/28/19 3213   Coping/Psychosocial   Plan Of Care Reviewed With patient   Plan of Care Review   Progress improving       Problem: Pressure Ulcer Risk (Phil Scale) (Adult,Obstetrics,Pediatric)  Goal: Skin Integrity  Outcome: Ongoing (interventions implemented as appropriate)      Problem: Fall Risk (Adult)  Goal: Absence of Falls  Outcome: Ongoing (interventions implemented as appropriate)      Problem: Pain, Acute (Adult)  Goal: Acceptable Pain Control/Comfort Level  Outcome: Ongoing (interventions implemented as appropriate)      Problem: Infection, Risk/Actual (Adult)  Goal: Infection Prevention/Resolution  Outcome: Ongoing (interventions implemented as appropriate)

## 2019-05-28 NOTE — PROGRESS NOTES
Gastroenterology  Daily Progress Note       SUBJECTIVE   This is a 59 y.o. year-old male admitted on 5/25/2019 with Generalized abdominal pain [R10.84]  Abnormal CT of the abdomen [R93.5]  Bandemia [D72.825]  Anemia, unspecified type [D64.9]  Diarrhea, unspecified type [R19.7].    Interval History: Completion of diverting ileostomy and decompressive transverse colostomy completed yesterday.  Patient reports walking up and down the hallways after surgery.  He states he is more comfortable today, tolerating clear liquids without difficulty.  Both ostomy bags with stool output and air.  Patient reports less abdominal pain.     OBJECTIVE      Vital signs in last 24 hours:  Temp:  [35.8 °C (96.4 °F)-36.7 °C (98 °F)] 36.1 °C (96.9 °F)  Heart Rate:  [47-69] 53  Resp:  [12-18] 16  BP: ()/(57-67) 105/57    Intake/Output Summary (Last 24 hours) at 05/28/19 0801  Last data filed at 05/28/19 0556   Gross per 24 hour   Intake             3600 ml   Output             1275 ml   Net             2325 ml       PHYSICAL EXAMINATION      Physical Exam  Constitutional: He is oriented to person, place, and time. No distress.   Cardiovascular: Normal rate and regular rhythm.  Exam reveals no friction rub.    No murmur heard.  Pulmonary/Chest: Effort normal and breath sounds normal. No respiratory distress. He has no wheezes.   Abdominal:    Distended, soft, colostomy and ileostomy both with stool, air, stoma sites look healthy   LABS / IMAGING / TELE      Labs  Labs are pending.    Results from last 7 days  Lab Units 05/27/19  0604 05/26/19  0711 05/25/19  1458   SODIUM mEQ/L 136 133* 132*   POTASSIUM mEQ/L 3.7 4.0 3.9   CHLORIDE mEQ/L 100 99 95*   CO2 mEQ/L 25 28 26   BUN mg/dL 9 9 12   CREATININE mg/dL 0.7* 0.7* 0.8   CALCIUM mg/dL 7.2* 7.6* 8.1*   ALBUMIN g/dL  --   --  2.0*   BILIRUBIN TOTAL mg/dL  --   --  0.6   ALK PHOS IU/L  --   --  58   ALT IU/L  --   --  23   AST IU/L  --   --  14*   GLUCOSE mg/dL 71 94 174*        Results from last 7 days  Lab Units 05/27/19  0604   MAGNESIUM mg/dL 2.3       Results from last 7 days  Lab Units 05/27/19  0604 05/26/19  0711 05/25/19  1458   WBC K/uL 3.92 4.01 5.26   HEMOGLOBIN g/dL 8.0* 8.0* 9.8*   HEMATOCRIT % 26.2* 26.7* 32.3*   PLATELETS K/uL 221 335 187   DIFF TYPE  Manu Manu Manu   NEUTROS PCT MAN % 55 58 61   LYMPHO PCT MAN % 26 32 16   MONO PCT MAN % 8 7 3   EOSINO PCT MAN % 1 0 0   BASOS PCT MAN % 0 0 0   BANDS PCT MAN % 8 3 20*   SEGS ABS MAN K/uL 2.16 2.33 3.21   LYMPHO ABS MAN K/uL 1.02* 1.28 0.84*   MONO ABS MAN K/uL 0.31 0.28* 0.16*   EOS ABS MAN K/uL 0.04 0.00* 0.00*   BASOS ABS MAN K/uL 0.00* 0.00* 0.00*         Imaging  Not applicable    ASSESSMENT AND PLAN      Crohn's disease of colon with complication (CMS/HCC) (HCC)   Assessment & Plan    59-year-old gentleman with a history of Crohn's colitis previously failed Humira, currently on Remicade, now presenting with toxic megacolon and dilated transverse colon diameter of 17.4 cm.  Now status post diverting loop ileostomy and transverse colostomy for decompression.    Plan:  -We will arrange for approval of Stelara; the earliest this may be given is generally within 2 weeks; will confirm this with surgical service in the context of recent interventions.  -C. difficile testing negative, stool studies pending.  -CRP elevated to 166.  -Strict avoidance of opioids.  -Lovenox DVT prophylaxis given high risk for thrombosis formation.  -Need outpatient follow-up with Dr. Carlson.          VTE Assessment: Padua    Code Status: Full Code  Estimated discharge date: 5/29/2019

## 2019-05-28 NOTE — PROGRESS NOTES
General Surgery Daily Progress Note    Subjective  Pain controlled postoperatively. Tolerating clears.       Objective     Vital signs in last 24 hours:  Temp:  [35.8 °C (96.4 °F)-36.6 °C (97.8 °F)] 36.1 °C (96.9 °F)  Heart Rate:  [47-69] 53  Resp:  [12-18] 16  BP: ()/(57-67) 105/57      Intake/Output Summary (Last 24 hours) at 05/28/19 0953  Last data filed at 05/28/19 0901   Gross per 24 hour   Intake             3700 ml   Output             1375 ml   Net             2325 ml     Intake/Output this shift:  I/O this shift:  In: 100 [IV Piggyback:100]  Out: 100 [Urine:100]    Physical Exam    General appearance: alert, appears stated age and cooperative  Lungs: no increased WOB  Heart: regular rate and rhythm  Abdomen: soft, distended, NT, ostomies pink and healthy  Extremities: extremities normal, warm and well-perfused; no cyanosis, clubbing, or edema  Skin: Skin color, texture, turgor normal. No rashes or lesions  Neurologic: Grossly normal      Assessment/Plan     59-year-old male with inflammatory bowel disease status unfortunately uncontrolled who presents with abdominal pain and concern due to a 17 cm transverse colon seen on CT scan.  POD #1 loop ileostomy and blowhole colostomy     -advance to LR  - IVF  - CTX, flagyl  - AM KUB  - serial abdominal exams  - Lov for dvt ppx  - f/u GI recs    Page 0741 with any questions  Angel Melendez, DO

## 2019-05-28 NOTE — PLAN OF CARE
Problem: Patient Care Overview  Goal: Plan of Care Review  Outcome: Ongoing (interventions implemented as appropriate)   05/28/19 0401   Coping/Psychosocial   Plan Of Care Reviewed With patient;spouse   Plan of Care Review   Progress improving   Outcome Summary VSS, tolerating clears. Denies discomfort, ostomy, castrejon. Slept through night.     Goal: Individualization & Mutuality  Outcome: Ongoing (interventions implemented as appropriate)      Problem: Pressure Ulcer Risk (Phil Scale) (Adult,Obstetrics,Pediatric)  Goal: Skin Integrity  Outcome: Ongoing (interventions implemented as appropriate)      Problem: Fall Risk (Adult)  Goal: Absence of Falls  Outcome: Ongoing (interventions implemented as appropriate)      Problem: Pain, Acute (Adult)  Goal: Acceptable Pain Control/Comfort Level  Outcome: Ongoing (interventions implemented as appropriate)      Problem: Infection, Risk/Actual (Adult)  Goal: Infection Prevention/Resolution  Outcome: Ongoing (interventions implemented as appropriate)

## 2019-05-28 NOTE — PATIENT CARE CONFERENCE
Care Progression Rounds Note  Date: 5/28/2019  Time: 10:29 AM     Patient Name: Layton Ritchie Jr.     Medical Record Number: [id number]   YOB: 1960  Sex: Male      Room/Bed: 1032    Admitting Diagnosis: Generalized abdominal pain [R10.84]  Abnormal CT of the abdomen [R93.5]  Bandemia [D72.825]  Anemia, unspecified type [D64.9]  Diarrhea, unspecified type [R19.7]   Admit Date/Time: 5/25/2019  2:43 PM    Primary Diagnosis: Ulcerative colitis (CMS/HCC) (HCC)  Principal Problem: Ulcerative colitis (CMS/HCC) (HCC)    GMLOS: pending  Anticipated Discharge Date: 5/30/2019    AM-PAC  Mobility Score: 24    Discharge Planning:  Living Arrangements: house  Concerns To Be Addressed: no discharge needs identified  Anticipated Discharge Disposition: home without services    Barriers to Discharge:  Barriers to Discharge: Medical issues not resolved  Comment: [facility]    Participants:  advanced practice provider, , nursing, occupational therapy, social work/services

## 2019-05-29 LAB
ANION GAP SERPL CALC-SCNC: 12 MEQ/L (ref 3–15)
ANISOCYTOSIS BLD QL SMEAR: ABNORMAL
BASOPHILS # BLD: 0 K/UL (ref 0.01–0.1)
BASOPHILS NFR BLD: 0 %
BUN SERPL-MCNC: 6 MG/DL (ref 8–20)
CALCIUM SERPL-MCNC: 7.3 MG/DL (ref 8.9–10.3)
CHLORIDE SERPL-SCNC: 98 MEQ/L (ref 98–109)
CO2 SERPL-SCNC: 25 MEQ/L (ref 22–32)
CREAT SERPL-MCNC: 0.6 MG/DL
DIFFERENTIAL METHOD BLD: ABNORMAL
EOSINOPHIL # BLD: 0 K/UL (ref 0.04–0.54)
EOSINOPHIL NFR BLD: 0 %
ERYTHROCYTE [DISTWIDTH] IN BLOOD BY AUTOMATED COUNT: 16 % (ref 11.6–14.4)
GFR SERPL CREATININE-BSD FRML MDRD: >60 ML/MIN/1.73M*2
GLUCOSE SERPL-MCNC: 107 MG/DL (ref 70–99)
HCT VFR BLDCO AUTO: 29.4 %
HGB BLD-MCNC: 9 G/DL
LYMPHOCYTES # BLD: 0.68 K/UL (ref 1.2–3.5)
LYMPHOCYTES NFR BLD: 13 %
MAGNESIUM SERPL-MCNC: 1.6 MG/DL (ref 1.8–2.5)
MCH RBC QN AUTO: 22.3 PG (ref 28–33.2)
MCHC RBC AUTO-ENTMCNC: 30.6 G/DL (ref 32.2–36.5)
MCV RBC AUTO: 73 FL (ref 83–98)
MICROCYTES BLD QL SMEAR: ABNORMAL
MONOCYTES # BLD: 0.1 K/UL (ref 0.3–1)
MONOCYTES NFR BLD: 2 %
NEUTS BAND # BLD: 0.31 K/UL (ref 0–0.53)
NEUTS BAND # BLD: 4.12 K/UL (ref 1.7–7)
NEUTS BAND NFR BLD: 6 %
NEUTS SEG NFR BLD: 79 %
PDW BLD AUTO: 9.8 FL (ref 9.4–12.4)
PLATELET # BLD AUTO: ABNORMAL K/UL
PLATELET # BLD EST: ABNORMAL 10*3/UL
PLATELET CLUMP BLD QL SMEAR: PRESENT
POIKILOCYTOSIS BLD QL SMEAR: ABNORMAL
POTASSIUM SERPL-SCNC: 3.4 MEQ/L (ref 3.6–5.1)
RBC # BLD AUTO: 4.03 M/UL (ref 4.5–5.8)
SODIUM SERPL-SCNC: 135 MEQ/L (ref 136–144)
WBC # BLD AUTO: 5.21 K/UL

## 2019-05-29 PROCEDURE — 86480 TB TEST CELL IMMUN MEASURE: CPT | Performed by: STUDENT IN AN ORGANIZED HEALTH CARE EDUCATION/TRAINING PROGRAM

## 2019-05-29 PROCEDURE — 36415 COLL VENOUS BLD VENIPUNCTURE: CPT | Performed by: SURGERY

## 2019-05-29 PROCEDURE — 21400000 HC ROOM AND CARE CCU/INTERMEDIATE

## 2019-05-29 PROCEDURE — 99024 POSTOP FOLLOW-UP VISIT: CPT | Performed by: COLON & RECTAL SURGERY

## 2019-05-29 PROCEDURE — 83735 ASSAY OF MAGNESIUM: CPT | Performed by: SURGERY

## 2019-05-29 PROCEDURE — 25800000 HC PHARMACY IV SOLUTIONS: Performed by: SURGERY

## 2019-05-29 PROCEDURE — 81335 TPMT GENE COM VARIANTS: CPT | Performed by: STUDENT IN AN ORGANIZED HEALTH CARE EDUCATION/TRAINING PROGRAM

## 2019-05-29 PROCEDURE — 25000000 HC PHARMACY GENERAL: Performed by: SURGERY

## 2019-05-29 PROCEDURE — 80048 BASIC METABOLIC PNL TOTAL CA: CPT | Performed by: SURGERY

## 2019-05-29 PROCEDURE — 63700000 HC SELF-ADMINISTRABLE DRUG: Performed by: SURGERY

## 2019-05-29 PROCEDURE — 63600000 HC DRUGS/DETAIL CODE: Performed by: SURGERY

## 2019-05-29 PROCEDURE — 85025 COMPLETE CBC W/AUTO DIFF WBC: CPT | Performed by: SURGERY

## 2019-05-29 RX ORDER — TAMSULOSIN HYDROCHLORIDE 0.4 MG/1
0.4 CAPSULE ORAL DAILY
Qty: 30 CAPSULE | Refills: 0 | Status: CANCELLED | OUTPATIENT
Start: 2019-05-30 | End: 2019-06-29

## 2019-05-29 RX ORDER — OXYCODONE HYDROCHLORIDE 5 MG/1
5 TABLET ORAL EVERY 6 HOURS PRN
Qty: 15 TABLET | Refills: 0 | Status: SHIPPED | OUTPATIENT
Start: 2019-05-29 | End: 2019-05-30 | Stop reason: HOSPADM

## 2019-05-29 RX ORDER — POTASSIUM CHLORIDE 750 MG/1
40 TABLET, FILM COATED, EXTENDED RELEASE ORAL ONCE
Status: DISCONTINUED | OUTPATIENT
Start: 2019-05-29 | End: 2019-05-29

## 2019-05-29 RX ORDER — TAMSULOSIN HYDROCHLORIDE 0.4 MG/1
0.4 CAPSULE ORAL DAILY
Status: DISCONTINUED | OUTPATIENT
Start: 2019-05-29 | End: 2019-05-30 | Stop reason: HOSPADM

## 2019-05-29 RX ADMIN — MAGNESIUM SULFATE IN WATER 4 G: 40 INJECTION, SOLUTION INTRAVENOUS at 20:07

## 2019-05-29 RX ADMIN — ACETAMINOPHEN 650 MG: 325 TABLET ORAL at 07:12

## 2019-05-29 RX ADMIN — ACETAMINOPHEN 650 MG: 325 TABLET ORAL at 12:57

## 2019-05-29 RX ADMIN — POTASSIUM CHLORIDE 40 MEQ: 2 INJECTION, SOLUTION, CONCENTRATE INTRAVENOUS at 23:19

## 2019-05-29 RX ADMIN — METRONIDAZOLE 500 MG: 500 INJECTION, SOLUTION INTRAVENOUS at 00:35

## 2019-05-29 NOTE — PLAN OF CARE
Problem: Patient Care Overview  Goal: Plan of Care Review  Outcome: Ongoing (interventions implemented as appropriate)   05/29/19 0508   Coping/Psychosocial   Plan Of Care Reviewed With patient   Plan of Care Review   Progress improving     Goal: Individualization & Mutuality  Outcome: Ongoing (interventions implemented as appropriate)      Problem: Pressure Ulcer Risk (Phil Scale) (Adult,Obstetrics,Pediatric)  Goal: Skin Integrity  Outcome: Ongoing (interventions implemented as appropriate)      Problem: Fall Risk (Adult)  Goal: Absence of Falls  Outcome: Ongoing (interventions implemented as appropriate)      Problem: Pain, Acute (Adult)  Goal: Acceptable Pain Control/Comfort Level  Outcome: Ongoing (interventions implemented as appropriate)

## 2019-05-29 NOTE — PATIENT CARE CONFERENCE
Care Progression Rounds Note  Date: 5/29/2019  Time: 10:46 AM     Patient Name: Layton Ritchie Jr.     Medical Record Number: [id number]   YOB: 1960  Sex: Male      Room/Bed: 1032    Admitting Diagnosis: Generalized abdominal pain [R10.84]  Abnormal CT of the abdomen [R93.5]  Bandemia [D72.825]  Anemia, unspecified type [D64.9]  Diarrhea, unspecified type [R19.7]   Admit Date/Time: 5/25/2019  2:43 PM    Primary Diagnosis: Ulcerative colitis (CMS/HCC) (HCC)  Principal Problem: Ulcerative colitis (CMS/HCC) (HCC)    GMLOS: 6.2  Anticipated Discharge Date: 5/30/2019    AM-PAC  Mobility Score: 24    Discharge Planning:  Living Arrangements: house  Concerns To Be Addressed: no discharge needs identified  Anticipated Discharge Disposition: home without services    Barriers to Discharge:  Barriers to Discharge: Delay in insurance authorization  Comment: Needs homecare for ileostomy- no PCP; awaiting setup    Participants:  , physician, social work/services, nursing, occupational therapy

## 2019-05-29 NOTE — PLAN OF CARE
Problem: Pressure Ulcer Risk (Phil Scale) (Adult,Obstetrics,Pediatric)  Goal: Skin Integrity  Outcome: Ongoing (interventions implemented as appropriate)

## 2019-05-29 NOTE — PROGRESS NOTES
Gastroenterology  Daily Progress Note       SUBJECTIVE   This is a 59 y.o. year-old male admitted on 5/25/2019 with Generalized abdominal pain [R10.84]  Abnormal CT of the abdomen [R93.5]  Bandemia [D72.825]  Anemia, unspecified type [D64.9]  Diarrhea, unspecified type [R19.7].    Interval History: Patient eating solid food this morning.  Concerned that he continues to pass frequent loose stools per rectum.  Decreased abdominal pain.  Both ileostomy and blowhole colostomy with stool and air noted.     OBJECTIVE      Vital signs in last 24 hours:  Temp:  [36 °C (96.8 °F)-36.4 °C (97.6 °F)] 36.4 °C (97.6 °F)  Heart Rate:  [57-81] 81  Resp:  [16-18] 16  BP: ()/(63-80) 121/71    Intake/Output Summary (Last 24 hours) at 05/29/19 1008  Last data filed at 05/29/19 0952   Gross per 24 hour   Intake          1670.25 ml   Output             1550 ml   Net           120.25 ml       PHYSICAL EXAMINATION      Physical Exam  Constitutional: He is oriented to person, place, and time. No distress.   Cardiovascular: Normal rate and regular rhythm.  Exam reveals no friction rub.    No murmur heard.  Pulmonary/Chest: Effort normal and breath sounds normal. No respiratory distress. He has no wheezes.   Abdominal:    Distended, soft, colostomy and ileostomy both with stool, air, stoma sites look healthy   LABS / IMAGING / TELE      Labs  I have reviewed the patient's labs to the time of note. No new clinical concern.    Results from last 7 days  Lab Units 05/28/19  0646 05/27/19  0604 05/26/19  0711 05/25/19  1458   SODIUM mEQ/L 131* 136 133* 132*   POTASSIUM mEQ/L 3.9 3.7 4.0 3.9   CHLORIDE mEQ/L 97* 100 99 95*   CO2 mEQ/L 29 25 28 26   BUN mg/dL 6* 9 9 12   CREATININE mg/dL 0.6* 0.7* 0.7* 0.8   CALCIUM mg/dL 7.2* 7.2* 7.6* 8.1*   ALBUMIN g/dL  --   --   --  2.0*   BILIRUBIN TOTAL mg/dL  --   --   --  0.6   ALK PHOS IU/L  --   --   --  58   ALT IU/L  --   --   --  23   AST IU/L  --   --   --  14*   GLUCOSE mg/dL 184* 71 94 174*        Results from last 7 days  Lab Units 05/29/19  0829   MAGNESIUM mg/dL 1.6*       Results from last 7 days  Lab Units 05/29/19  0826 05/28/19  0646 05/27/19  0604 05/26/19  0711 05/25/19  1458   WBC K/uL 5.21 4.31 3.92 4.01 5.26   HEMOGLOBIN g/dL 9.0* 8.0* 8.0* 8.0* 9.8*   HEMATOCRIT % 29.4* 27.0* 26.2* 26.7* 32.3*   PLATELETS K/uL  --   --  221 335 187   DIFF TYPE  Manu Auto Manu Manu Manu   NRBC %  --  0.0  --   --   --    IMM GRANULOCYTES %  --  0.7  --   --   --    NEUTROPHILS %  --  67.8  --   --   --    NEUTROS PCT MAN % 79  --  55 58 61   LYMPHOCYTES %  --  25.5  --   --   --    LYMPHO PCT MAN % 13  --  26 32 16   MONOCYTES %  --  5.6  --   --   --    MONO PCT MAN % 2  --  8 7 3   EOSINOPHILS %  --  0.2  --   --   --    EOSINO PCT MAN % 0  --  1 0 0   BASOPHILS %  --  0.2  --   --   --    BASOS PCT MAN % 0  --  0 0 0   BANDS PCT MAN % 6  --  8 3 20*   IMM GRANUCOCYTES ABS K/uL  --  0.03  --   --   --    SEGS ABS MAN K/uL 4.12  --  2.16 2.33 3.21   LYMPHO ABS MAN K/uL 0.68*  --  1.02* 1.28 0.84*   MONO ABS AUTO K/uL  --  0.24*  --   --   --    MONO ABS MAN K/uL 0.10*  --  0.31 0.28* 0.16*   EOS ABS AUTO K/uL  --  0.01*  --   --   --    EOS ABS MAN K/uL 0.00*  --  0.04 0.00* 0.00*   BASO ABS AUTO K/uL  --  0.01  --   --   --    BASOS ABS MAN K/uL 0.00*  --  0.00* 0.00* 0.00*         Imaging  Not applicable    ASSESSMENT AND PLAN      Crohn's disease of colon with complication (CMS/HCC) (HCC)   Assessment & Plan    59-year-old gentleman with a history of Crohn's colitis previously failed Humira, currently on Remicade, now presenting with toxic megacolon and dilated transverse colon diameter of 17.4 cm.  Now status post diverting loop ileostomy and transverse colostomy for decompression.    Plan:  -Outpatient office is arranging for approval of Stelara to be administered on an outpatient basis.  QuantiFERON gold testing sent in anticipation of new biologic initiation.  -TPM T levels have been sent.  Consider  addition of Imuran as an outpatient for improved efficacy of therapy.  -Strict avoidance of opioids.  -Lovenox DVT prophylaxis given high risk for thrombosis formation.  -Outpatient follow-up with Dr. Carlson.          VTE Assessment: Padua    Code Status: Full Code  Estimated discharge date: 5/30/2019

## 2019-05-29 NOTE — PROGRESS NOTES
General Surgery Daily Progress Note    Subjective  Reported to have multiple loose bowel movements from his rectum overnight. Dobbins catheter removed, voiding. Tolerating his diet. Ambulating without issue.    Objective     Vital signs in last 24 hours:  Temp:  [36 °C (96.8 °F)-36.4 °C (97.6 °F)] 36.4 °C (97.6 °F)  Heart Rate:  [57-81] 81  Resp:  [16-18] 16  BP: ()/(63-80) 121/71      Intake/Output Summary (Last 24 hours) at 05/29/19 0972  Last data filed at 05/29/19 0713   Gross per 24 hour   Intake          1670.25 ml   Output             1450 ml   Net           220.25 ml     Intake/Output this shift:  I/O this shift:  In: -   Out: 375 [Urine:375]    Physical Exam    General appearance: alert, appears stated age and cooperative  Neck: no adenopathy, no carotid bruit, no JVD, supple, symmetrical, trachea midline and thyroid not enlarged, symmetric, no tenderness/mass/nodules  Lungs: clear to auscultation bilaterally  Heart: regular rate and rhythm, S1, S2 normal, no murmur, click, rub or gallop  Abdomen: soft, non distended, diverting ileostomy and blowhole producing liquid stool  Extremities: extremities normal, warm and well-perfused; no cyanosis, clubbing, or edema  Pulses: 2+ and symmetric  Neurologic: Grossly normal    VTE Assessment: I have reassessed and the patient's VTE risk and treatment plan is appropriate.    Labs  Labs are pending.    Imaging  Not applicable      Assessment/Plan     59-year-old male with inflammatory bowel disease status unfortunately uncontrolled who presents with abdominal pain and concern due to a 17 cm transverse colon seen on CT scan.      POD #2 loop ileostomy and blowhole colostomy      - continue LR diet  - IVF discontinued  - antibiotics discontinued  - Lov for dvt ppx  - f/u GI recs  - Plan for dispo home today tentatively    *[phone] with questions or concerns    Ansley Moura MD

## 2019-05-29 NOTE — PLAN OF CARE
Problem: Patient Care Overview  Goal: Discharge Needs Assessment  Outcome: Ongoing (interventions implemented as appropriate)   05/29/19 1536   DC Needs Assessment   Anticipated Discharge Disposition home with home health services   Type of Home Care Services nursing   Activity/Self Care ROS   Equipment Currently Used at Home none     Pt lives in a 2 story house. Ambulates independently. His daughter lives w/ him. Emergency contact confirmed. Pt does not a pcp.  Will need home care on dc. Referral made to [facility]. They can accept pt and will schedule 1st visit for Fri. Dr Hernandez agreed to write home care orders + follow pt since he does not have a pcp

## 2019-05-29 NOTE — PLAN OF CARE
Problem: Patient Care Overview  Goal: Individualization & Mutuality  Outcome: Outcome(s) Achieved Date Met: 05/29/19

## 2019-05-30 VITALS
BODY MASS INDEX: 26.78 KG/M2 | HEIGHT: 68 IN | OXYGEN SATURATION: 97 % | WEIGHT: 176.7 LBS | TEMPERATURE: 98 F | DIASTOLIC BLOOD PRESSURE: 70 MMHG | RESPIRATION RATE: 16 BRPM | SYSTOLIC BLOOD PRESSURE: 109 MMHG | HEART RATE: 74 BPM

## 2019-05-30 LAB
ANION GAP SERPL CALC-SCNC: 7 MEQ/L (ref 3–15)
ANISOCYTOSIS BLD QL SMEAR: ABNORMAL
BACTERIA BLD CULT: NORMAL
BACTERIA BLD CULT: NORMAL
BACTERIA STL CULT: NORMAL
BASOPHILS # BLD: 0.01 K/UL (ref 0.01–0.1)
BASOPHILS NFR BLD: 0.2 %
BUN SERPL-MCNC: <5 MG/DL (ref 8–20)
CALCIUM SERPL-MCNC: 7.1 MG/DL (ref 8.9–10.3)
CHLORIDE SERPL-SCNC: 100 MEQ/L (ref 98–109)
CO2 SERPL-SCNC: 27 MEQ/L (ref 22–32)
CREAT SERPL-MCNC: 0.6 MG/DL
DIFFERENTIAL METHOD BLD: ABNORMAL
EOSINOPHIL # BLD: 0.03 K/UL (ref 0.04–0.54)
EOSINOPHIL NFR BLD: 0.7 %
ERYTHROCYTE [DISTWIDTH] IN BLOOD BY AUTOMATED COUNT: 16 % (ref 11.6–14.4)
GFR SERPL CREATININE-BSD FRML MDRD: >60 ML/MIN/1.73M*2
GLUCOSE SERPL-MCNC: 95 MG/DL (ref 70–99)
HCT VFR BLDCO AUTO: 28.1 %
HGB BLD-MCNC: 8.6 G/DL
IMM GRANULOCYTES # BLD AUTO: 0.03 K/UL (ref 0–0.08)
IMM GRANULOCYTES NFR BLD AUTO: 0.7 %
LYMPHOCYTES # BLD: 1.6 K/UL (ref 1.2–3.5)
LYMPHOCYTES NFR BLD: 35.3 %
MAGNESIUM SERPL-MCNC: 2 MG/DL (ref 1.8–2.5)
MCH RBC QN AUTO: 22.4 PG (ref 28–33.2)
MCHC RBC AUTO-ENTMCNC: 30.6 G/DL (ref 32.2–36.5)
MCV RBC AUTO: 73.2 FL (ref 83–98)
MICROCYTES BLD QL SMEAR: ABNORMAL
MONOCYTES # BLD: 0.29 K/UL (ref 0.3–1)
MONOCYTES NFR BLD: 6.4 %
NEUTROPHILS # BLD: 2.57 K/UL (ref 1.7–7)
NEUTS SEG NFR BLD: 56.7 %
NRBC BLD-RTO: 0 %
OVALOCYTES BLD QL SMEAR: ABNORMAL
PDW BLD AUTO: 11.5 FL (ref 9.4–12.4)
PLATELET # BLD AUTO: ABNORMAL K/UL
PLATELET # BLD EST: ABNORMAL 10*3/UL
PLATELET CLUMP BLD QL SMEAR: PRESENT
POTASSIUM SERPL-SCNC: 4.1 MEQ/L (ref 3.6–5.1)
RBC # BLD AUTO: 3.84 M/UL (ref 4.5–5.8)
SODIUM SERPL-SCNC: 134 MEQ/L (ref 136–144)
WBC # BLD AUTO: 4.53 K/UL

## 2019-05-30 PROCEDURE — 36415 COLL VENOUS BLD VENIPUNCTURE: CPT | Performed by: SURGERY

## 2019-05-30 PROCEDURE — 80048 BASIC METABOLIC PNL TOTAL CA: CPT | Performed by: SURGERY

## 2019-05-30 PROCEDURE — 200200 PR NO CHARGE: Performed by: COLON & RECTAL SURGERY

## 2019-05-30 PROCEDURE — 85025 COMPLETE CBC W/AUTO DIFF WBC: CPT | Performed by: SURGERY

## 2019-05-30 PROCEDURE — 83735 ASSAY OF MAGNESIUM: CPT | Performed by: SURGERY

## 2019-05-30 RX ORDER — TAMSULOSIN HYDROCHLORIDE 0.4 MG/1
0.4 CAPSULE ORAL DAILY
Qty: 30 CAPSULE | Refills: 0 | Status: SHIPPED | OUTPATIENT
Start: 2019-05-31 | End: 2020-03-06 | Stop reason: ENTERED-IN-ERROR

## 2019-05-30 NOTE — PROGRESS NOTES
Per update from CPR anticipate dc to home with home health services after being seen by wound ostomy nurse, YESENIA. Update was provided to Isela in central intake at [facility]. Patients family to provide transportation home.  Care coordination to follow until dc is complete. Bp [phone]

## 2019-05-30 NOTE — PROGRESS NOTES
Gastroenterology  Daily Progress Note       SUBJECTIVE   This is a 59 y.o. year-old male admitted on 5/25/2019 with Generalized abdominal pain [R10.84]  Abnormal CT of the abdomen [R93.5]  Bandemia [D72.825]  Anemia, unspecified type [D64.9]  Diarrhea, unspecified type [R19.7].    Interval History: Patient reports feeling well this morning.  Abdomen remains soft.  Tolerating solid diet.  Stoma and colostomy site both with stool.     OBJECTIVE      Vital signs in last 24 hours:  Temp:  [36 °C (96.8 °F)-37.2 °C (98.9 °F)] 37.2 °C (98.9 °F)  Heart Rate:  [62-82] 82  Resp:  [16] 16  BP: (103-132)/(63-78) 112/65    Intake/Output Summary (Last 24 hours) at 05/30/19 0810  Last data filed at 05/30/19 0500   Gross per 24 hour   Intake             1070 ml   Output             1725 ml   Net             -655 ml       PHYSICAL EXAMINATION      Physical Exam  Constitutional: He is oriented to person, place, and time. No distress.   Cardiovascular: Normal rate and regular rhythm.  Exam reveals no friction rub.    No murmur heard.  Pulmonary/Chest: Effort normal and breath sounds normal. No respiratory distress. He has no wheezes.   Abdominal: soft, colostomy and ileostomy both with stool no blood, stoma sites healthy   LABS / IMAGING / TELE      Labs  I have reviewed the patient's labs to the time of note. No new clinical concern.    Results from last 7 days  Lab Units 05/29/19  0829 05/28/19  0646 05/27/19  0604  05/25/19  1458   SODIUM mEQ/L 135* 131* 136  < > 132*   POTASSIUM mEQ/L 3.4* 3.9 3.7  < > 3.9   CHLORIDE mEQ/L 98 97* 100  < > 95*   CO2 mEQ/L 25 29 25  < > 26   BUN mg/dL 6* 6* 9  < > 12   CREATININE mg/dL 0.6* 0.6* 0.7*  < > 0.8   CALCIUM mg/dL 7.3* 7.2* 7.2*  < > 8.1*   ALBUMIN g/dL  --   --   --   --  2.0*   BILIRUBIN TOTAL mg/dL  --   --   --   --  0.6   ALK PHOS IU/L  --   --   --   --  58   ALT IU/L  --   --   --   --  23   AST IU/L  --   --   --   --  14*   GLUCOSE mg/dL 107* 184* 71  < > 174*   < > = values  in this interval not displayed.    Results from last 7 days  Lab Units 05/29/19  0829   MAGNESIUM mg/dL 1.6*       Results from last 7 days  Lab Units 05/29/19  0826 05/28/19  0646 05/27/19  0604 05/26/19  0711 05/25/19  1458   WBC K/uL 5.21 4.31 3.92 4.01 5.26   HEMOGLOBIN g/dL 9.0* 8.0* 8.0* 8.0* 9.8*   HEMATOCRIT % 29.4* 27.0* 26.2* 26.7* 32.3*   PLATELETS K/uL  --   --  221 335 187   DIFF TYPE  Manu Auto Manu Manu Manu   NRBC %  --  0.0  --   --   --    IMM GRANULOCYTES %  --  0.7  --   --   --    NEUTROPHILS %  --  67.8  --   --   --    NEUTROS PCT MAN % 79  --  55 58 61   LYMPHOCYTES %  --  25.5  --   --   --    LYMPHO PCT MAN % 13  --  26 32 16   MONOCYTES %  --  5.6  --   --   --    MONO PCT MAN % 2  --  8 7 3   EOSINOPHILS %  --  0.2  --   --   --    EOSINO PCT MAN % 0  --  1 0 0   BASOPHILS %  --  0.2  --   --   --    BASOS PCT MAN % 0  --  0 0 0   BANDS PCT MAN % 6  --  8 3 20*   IMM GRANUCOCYTES ABS K/uL  --  0.03  --   --   --    SEGS ABS MAN K/uL 4.12  --  2.16 2.33 3.21   LYMPHO ABS MAN K/uL 0.68*  --  1.02* 1.28 0.84*   MONO ABS AUTO K/uL  --  0.24*  --   --   --    MONO ABS MAN K/uL 0.10*  --  0.31 0.28* 0.16*   EOS ABS AUTO K/uL  --  0.01*  --   --   --    EOS ABS MAN K/uL 0.00*  --  0.04 0.00* 0.00*   BASO ABS AUTO K/uL  --  0.01  --   --   --    BASOS ABS MAN K/uL 0.00*  --  0.00* 0.00* 0.00*         Imaging  Not applicable    ASSESSMENT AND PLAN      Crohn's disease of colon with complication (CMS/HCC) (HCC)   Assessment & Plan    59-year-old gentleman with a history of Crohn's colitis previously failed Humira, currently on Remicade, now presenting with toxic megacolon and dilated transverse colon diameter of 17.4 cm.  Now status post diverting loop ileostomy and transverse colostomy for decompression.    Plan:  -Outpatient office is arranging for approval of Stelara to be administered on an outpatient basis.  QuantiFERON gold testing sent in anticipation of new biologic initiation.  GI office  will call patient with scheduling for Stelara.  -TPMT levels have been sent.  Consider addition of Imuran as an outpatient for improved efficacy of therapy.  This will be done as an outpatient.  -Strict avoidance of opioids.  -Lovenox DVT prophylaxis given high risk for thrombosis formation.  -Outpatient follow-up with Dr. Carlson.  -We will sign off for now.  Please call with questions.          VTE Assessment: Padua    Code Status: Full Code  Estimated discharge date: 5/30/2019

## 2019-05-30 NOTE — CONSULTS
Wound Ostomy Continence Note    Subjective    HPI Patient is a 59 y.o. male who was admitted on 5/25/2019 with a diagnosis of Generalized abdominal pain [R10.84]  Abnormal CT of the abdomen [R93.5]  Bandemia [D72.825]  Anemia, unspecified type [D64.9]  Diarrhea, unspecified type [R19.7].    Problem list Problem List:   Patient Active Problem List   Diagnosis   • Generalized abdominal pain   • Crohn's disease of colon with complication (CMS/HCC) (HCC)      PMH/PSH Medical History: History reviewed. No pertinent past medical history.  Surgical History: History reviewed. No pertinent surgical history.   Assessment and Recommendation      s/p end ileostomy on lower r side of abd and mucus fistulae in upper left quadrant.    End ileostomy is protruding well, viable and functioning loose stool and flatus.  No parastomal skin irritation but + blue ecchymosis in umbilical incision.  The mucus fistulae is at skin level and functioning residual stool.    I removed both pouches and gave a lesson on reapplication.  Both patterns enclosed in patient ostomy kit.  Has 8 drainable pouches included in kit to take home.       Date: 05/30/19  Signature: SAAD Guadarrama

## 2019-05-30 NOTE — PATIENT CARE CONFERENCE
Care Progression Rounds Note  Date: 5/30/2019  Time: 10:40 AM     Patient Name: Layton Ritchie Jr.     Medical Record Number: [id number]   YOB: 1960  Sex: Male      Room/Bed: 1032    Admitting Diagnosis: Generalized abdominal pain [R10.84]  Abnormal CT of the abdomen [R93.5]  Bandemia [D72.825]  Anemia, unspecified type [D64.9]  Diarrhea, unspecified type [R19.7]   Admit Date/Time: 5/25/2019  2:43 PM    Primary Diagnosis: Ulcerative colitis (CMS/HCC) (HCC)  Principal Problem: Ulcerative colitis (CMS/HCC) (HCC)    GMLOS: 6.2  Anticipated Discharge Date: 5/30/2019    AM-PAC  Mobility Score: 24    Discharge Planning:  Living Arrangements: house  Concerns To Be Addressed: no discharge needs identified  Anticipated Discharge Disposition: home with home health services  Type of Home Care Services: nursing    Barriers to Discharge:  Barriers to Discharge: None  Comment: Needs homecare for ileostomy- no PCP; awaiting setup    Participants:  advanced practice provider, , nursing, occupational therapy, social work/services

## 2019-05-30 NOTE — PLAN OF CARE
Problem: Patient Care Overview  Goal: Plan of Care Review  Outcome: Ongoing (interventions implemented as appropriate)   05/30/19 0452   Coping/Psychosocial   Plan Of Care Reviewed With patient   Plan of Care Review   Progress improving     Goal: Individualization & Mutuality  Outcome: Ongoing (interventions implemented as appropriate)      Problem: Pressure Ulcer Risk (Phil Scale) (Adult,Obstetrics,Pediatric)  Goal: Skin Integrity  Outcome: Ongoing (interventions implemented as appropriate)      Problem: Fall Risk (Adult)  Goal: Absence of Falls  Outcome: Ongoing (interventions implemented as appropriate)      Problem: Pain, Acute (Adult)  Goal: Acceptable Pain Control/Comfort Level  Outcome: Ongoing (interventions implemented as appropriate)

## 2019-05-30 NOTE — DISCHARGE SUMMARY
Inpatient Discharge Summary    BRIEF OVERVIEW  Admitting Provider:  H&P Notes 4/30/2019 to 5/30/2019     Date of Service Author Author Type Status Note Type File Time    05/25/19 5466 Harshad Castro MD Resident Attested H&P 05/26/19 9789          Attending Provider: James Hernandez MD Attending phys phone: [phone]  Primary Care Physician at Discharge: Pt States, No Pcp [phone]    Admission Date: 5/25/2019     Discharge Date: 5/30/2019    Primary Discharge Diagnosis  Ulcerative colitis (CMS/HCC) ([facility])    Secondary Discharge Diagnosis  Active Hospital Problems    Diagnosis Date Noted   • Crohn's disease of colon with complication (CMS/HCC) ([facility]) 05/26/2019   • Generalized abdominal pain 05/25/2019      Resolved Hospital Problems    Diagnosis Date Noted Date Resolved   • Ulcerative colitis (CMS/HCC) ([facility]) 05/25/2019 05/26/2019       DETAILS OF HOSPITAL STAY    Operative Procedures Performed  Procedure(s):  Colostomy/Ileostomy Laparoscopic    Consults:   Consult Notes 4/30/2019 to 5/30/2019     Date of Service Author Author Type Status Note Type File Time    05/27/19 1232 Cesliia Terry RD Registered Dietitian Signed Consults 05/27/19 1237    05/26/19 0856 Jenny Cai MD Fellow Attested Consults 05/26/19 1038          Consult Orders During Admission:  IP CONSULT TO GASTROENTEROLOGY  IP CONSULT TO WOUND OSTOMY CONTINENCE     Procedures: None    Imaging  X-ray Abdomen 1 View    Result Date: 5/26/2019  IMPRESSION: Dilated transverse colon with no significant change from the prior CT    Ct Abdomen Pelvis With Iv Contrast    Result Date: 5/25/2019  IMPRESSION: 1.  Marked gaseous dilation of the mid to distal transverse colon up to a diameter of 17.4 cm. Proximal to this, there is stool throughout the colon. Along the distal aspect of the dilated segment and distal to this, there is diffuse wall thickening from the splenic flecture through the descending and rectosigmoid colon, consistent with  colitis, most likely infectious or inflammatory, such as Crohn's disease. Given the presence of colitis and the dilation of the transverse colon, findings are suspicious for toxic megacolon. 2.  Splenomegaly. Finding:    Other   Acuity: Critical  Status:  CLOSED Critical read back was performed and results were read back by Garth Green, 5/25/2019 9:14 PM.       Presenting Problem/History of Present Illness  Generalized abdominal pain     59 y.o. male with a history of severe Crohn's colitis on Remicade presenting with worsening abdominal pain. CT of the abdomen and pelvis at the emergency department showed markded gaseous dilation of the transverse colon measuring up to 17.4cm in diameter. Admitted to surgical service for surgical management.    Exam on Day of Discharge  Patient seen and examined on day of discharge.  General appearance: alert, appears stated age and cooperative  Neck: no adenopathy, no carotid bruit, no JVD, supple, symmetrical, trachea midline and thyroid not enlarged, symmetric, no tenderness/mass/nodules  Lungs: clear to auscultation bilaterally  Heart: regular rate and rhythm, S1, S2 normal, no murmur, click, rub or gallop  Abdomen: soft, non distended, diverting ileostomy and blowhole producing liquid stool  Extremities: extremities normal, warm and well-perfused; no cyanosis, clubbing, or edema  Pulses: 2+ and symmetric  Neurologic: Grossly normal    Hospital Course  Mr Ritchie was admitted to the inpatient surgical service on 5/25/19, initially presenting to the emergency department with complaints of worsening abdominal pain, found to have megacolon on CT imaging (transverse colon measuring up to 17.4cm). He was brought to the operating room on 5/27/19, where he had an anorectal exam under anesthesia, followed by laparoscopic diverting loop ileostomy and formation of a transverse colostomy (blowhole procedure). Post operatively he was extubated, and transferred back to surgical step down  with a castrejon catheter. He was started on a clear liquid diet, his castrejon removed on POD #1. His ostomy began to function on POD #1, and he was advanced to a low residue diet on POD #2. During his hospital course he was seen by gastroenterology (Dr Haynes is his outpatient gastroenterologist), it was decided that he will be initiated on ustekunmab (Stelara) at the outpatient setting once approved. He received ostomy teaching. He was discharged home on POD #3, at that point able to tolerate his diet, having regular bowel function.     Discharge Orders     Medication List      START taking these medications    tamsulosin 0.4 mg capsule  Commonly known as:  FLOMAX  Start taking on:  5/31/2019  Take 1 capsule (0.4 mg total) by mouth daily.  Dose:  0.4 mg        STOP taking these medications    predniSONE 10 mg tablet  Commonly known as:  DELTASONE            Outpatient Follow-Ups  Encounter Information     You do not currently have any appointments scheduled.        Referrals:  No orders of the defined types were placed in this encounter.      Active Issues Requiring Follow-up  Issue: Crohn's colitis  What is Needed: Follow up with outpatient GI to initiate Stelara      Test Results Pending at Discharge  Unresulted Labs     Start     Ordered    05/29/19 0947  QuantiFERON-TB  Once      05/29/19 0946    05/29/19 0947  Thiopurine S-Methyltransferase (TPMT) Genotype  Once      05/29/19 0946    05/26/19 0600  Basic metabolic panel  Daily     Start Status   05/29/19 0600 Collected (05/29/19 0826)   05/31/19 0600 Scheduled   06/01/19 0600 Scheduled       05/25/19 2221 05/26/19 0600  CBC and Differential  Daily     Start Status   05/31/19 0600 Scheduled   06/01/19 0600 Scheduled       05/25/19 2221 05/25/19 2222  Magnesium  Daily     Start Status   05/28/19 0600 Collected (05/29/19 0826)   05/31/19 0600 Scheduled       05/25/19 2221 05/25/19 1948  Blood Culture Blood, Venous  (ED LAB BLOOD CULTURE X2)  STAT       05/25/19 1947 05/25/19 1948  Blood Culture Blood, Venous  (ED LAB BLOOD CULTURE X2)  STAT     Comments:  Draw from a second site different than first site.      05/25/19 1947          Discharge Disposition  Final discharge disposition not confirmed  Code Status at Discharge: Full Code  Physician Order for Life-Sustaining Treatment Document Status      No documents found

## 2019-05-30 NOTE — DISCHARGE INSTRUCTIONS
Follow-up: Please call Dr. Hernandez's office at [phone] upon discharge to schedule your follow-up appointment for 2 weeks. Please call Dr. Hernandez's office if you have any questions/concerns. Call if you experience the following: fevers (>101)/chills, nausea, vomiting, high ostomy output (greater than 1.2 L for 2 consecutive days), worsening abdominal pain or distention, headache, lightheadedness, dizziness or changes in vision.     1) Dr. Hernandez, [place], [facility], [address], call today to schedule your two week follow-up appointment. Please bring your osotmy supplies with you to your follow-up appointment.  2) Gastroenterology, Dr. Carlson, [place], [facility] [place], [address]. You were followed by GI during your hospitalization and are being set up for [facility] as an outpatient. Please call [phone] to schedule your follow-up appointment in 2 weeks.   3) Please follow-up with your primary care physician within 2 weeks of discharge from the hospital to update them regarding your hospital stay.     Medications:   Please resume all of your previous home medications unless otherwise indicated. Apply desitin to perianal region to help with irritation.    Please ask your gastroenterologist when to resume prednisone as an outpatient.     PAIN: Take Oxycodone TYLENOL 650mg every 4 hours as needed for mild pain.     Diet: Low residue, low fiber. Try to avoid fresh fruits and vegetables, bran, whole grains, and carbonated beverages.    Wound: Home care nursing with [facility] has been arranged for your ostomy care. Please record your output daily. If your output is greater than 1.2 L for two consecutive days, please call the office. Please shower daily. No tub baths until seen in follow-up. No lotions or ointments to wounds      Dr. James Hernandez  [facility]  [address]  [address] [place] [address] [phone]

## 2019-05-31 LAB
CROSSMATCH: NORMAL
CROSSMATCH: NORMAL
ISBT CODE: 6200
ISBT CODE: 6200
M TB IFN-G BLD-IMP: NEGATIVE
MITOGEN-NIL: 2.5 IU/ML
NIL: 0.06 IU/ML
PRODUCT CODE: NORMAL
PRODUCT CODE: NORMAL
PRODUCT STATUS: NORMAL
PRODUCT STATUS: NORMAL
SPECIMEN EXP DATE BLD: NORMAL
SPECIMEN EXP DATE BLD: NORMAL
TB AG-NIL: 0 IU/ML
TB2 AG - NIL: 0.01 IU/ML
UNIT ABO: NORMAL
UNIT ABO: NORMAL
UNIT ID: NORMAL
UNIT ID: NORMAL
UNIT RH: POSITIVE
UNIT RH: POSITIVE

## 2019-06-03 LAB — TPMT GENE MUT ANL BLD/T: NORMAL

## 2019-06-10 ENCOUNTER — OFFICE VISIT (OUTPATIENT)
Dept: SURGERY | Facility: CLINIC | Age: 59
End: 2019-06-10
Payer: COMMERCIAL

## 2019-06-10 VITALS
DIASTOLIC BLOOD PRESSURE: 66 MMHG | WEIGHT: 165 LBS | SYSTOLIC BLOOD PRESSURE: 115 MMHG | HEART RATE: 98 BPM | HEIGHT: 68 IN | BODY MASS INDEX: 25.01 KG/M2

## 2019-06-10 DIAGNOSIS — K50.119 CROHN'S DISEASE OF COLON WITH COMPLICATION (CMS/HCC): Primary | ICD-10-CM

## 2019-06-10 PROCEDURE — 99024 POSTOP FOLLOW-UP VISIT: CPT | Performed by: COLON & RECTAL SURGERY

## 2019-06-10 NOTE — PROGRESS NOTES
Patient ID: Layton Ritchie Jr.                              : 1960  MRN: [id number]                                            Visit Date: 2019  Encounter Provider: James Hernandez  Referring Provider: No ref. provider found    Subjective   Chief Complaint: Post-op    Layton Ritchie Jr. is a 59 y.o. 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  He is doing well and tolerating a diet. He continues to have an extensive amount of mucus from below.  He is working with the home care nurse to learn the ostomy appliance changes. He had his first infusion of Stelara. He will continue with infusions every 8 weeks.    Colostomy/Ileostomy Laparoscopic Procedure Note     Hospital: [facility]  Medical Record Number:  [id number]  Patient Name:  Layton Ritchie Jr.  YOB: 1960   Date of Operation:  2019 - 2019     Procedure:    Colostomy/Ileostomy Laparoscopic  CPT(R) Code:  82196 - [place] LAP, SURG COLOSTOMY     Laparoscopic loop ileostomy     Anorectal exam under anesthesia        Pre-op Diagnosis     * Ulcerative pancolitis with other complication (CMS/HCC) [K51.018]       Post-op Diagnosis     * Ulcerative pancolitis with other complication (CMS/HCC) [K51.018]     Surgeon(s) and Role:     * Eduardo Hendricks DO - Resident - Assisting     * James Hernandez MD - Primary     Anesthesia: General     Staff:   Circulator: Hue Richards RN  Scrub Person: Monty Sutton      Clinical History: This 59-year-old gentleman has severe Crohn's colitis and now presents with megacolon and severe colitis symptoms.  He has Remicade antibodies and subtherapeutic serum levels.  Dr. Carlson plans to switch him to a different biologic, Stelara.  He presents now for diversion with loop ileostomy and blowhole colostomy.  If his colon heals on Stelara, we can reverse the stoma's.  If not, he  will need completion proctocolectomy and conversion to end ileostomy.     Findings: Ulcerated anal canal, particularly posterior.  He has deep penetrating ulcers in the rectum.  I was not able to find any fistula to the perianal skin.  Intra-abdominally, the left colon had creeping fat and significant thickening particularly in the area of the descending colon.  The transverse colon was significantly dilated.  The small bowel was run from the ileocecal valve to the ligament of Treitz and no obvious Crohn's disease could be identified of the small bowel.     Procedure Details   The patient was placed in lithotomy position on the operating table after general anesthesia was induced.  The perianal area was prepped with Betadine and draped sterilely.  The anus and distal rectum was examined using anal retractors and the above findings were noted.  There was severe proctitis with deep discrete ulcerations there was no evidence of fistula to the perianal skin.  There was ulceration in the anal canal, particularly posteriorly.  He has an enlarged right posterior hemorrhoid.     The abdomen was then prepped with ChloraPrep and draped sterilely.  A vertical incision umbilicus is been the perineal cavity was entered.  Stay sutures were placed and the Johnson trocar was inserted.  Two 5 mm ports were placed in left abdomen.  All the port sites and the stoma sites were infiltrated with half percent Marcaine.  The bowel was examined as noted and run from the ligament of Treitz to the ileocecal valve.  There was no evidence of obvious Crohn's disease of the small bowel.  The terminal ileum was identified and marked proximally and distally and then an umbilical tape placed under the mesentery.  We then created a trephine through the right rectus muscle and brought the ileum out for loop ileostomy.  We confirmed proper orientation of the ileum without twisting.  The abdomen was deflated.  A trephine was then created through the left  "rectus muscle in the upper abdomen over the distended transverse colon.  The serosa of the colon was sutured to the posterior rectus sheath circumferentially.  The umbilical fascia was closed with a figure-of-eight suture of 0 PDS and the skin incisions closed with 4-0 Vicryl subcuticular sutures and Dermabond.     The loop ileostomy was then matured in standard everting fashion to the dermis with interrupted 3-0 chromic sutures over a [place] chao.  The transverse colon was then opened and a large amount of gas was expressed and the abdomen deflated.  The mucosa was then sutured circumferentially to the dermis with interrupted 3-0 Vicryl.  Stoma appliances were placed on both stomas.     Estimated Blood Loss: No blood loss documented.     Specimens:                   Order Name Source Comment Collection Info Order Time   PREPARE RBC       5/27/2019  7:55 AM    Transfusion indications  Pre-OP major surgery with bleeding risk          Has consent been obtained?  Yes                Drains:       Implants: * No implants in log *           Complications:  None; patient tolerated the procedure well.           Disposition: PACU - hemodynamically stable.           Condition: stable     James Hernandez MD  Phone Number: [phone]          Medications:   Current Outpatient Prescriptions:   •  tamsulosin (FLOMAX) 0.4 mg capsule, Take 1 capsule (0.4 mg total) by mouth daily., Disp: 30 capsule, Rfl: 0    Past Medical History:  has a past medical history of Crohn's disease of colon with complication (CMS/HCC) ([facility]) (5/26/2019).    Objective   Vitals: /66   Pulse 98   Ht 1.727 m (5' 8\")   Wt 74.8 kg (165 lb)   BMI 25.09 kg/m²   Physical Exam   Cardiovascular: Normal rate and regular rhythm.    Pulmonary/Chest: Breath sounds normal.   Abdominal: Soft. Normal appearance and bowel sounds are normal. He exhibits no distension and no mass. There is no tenderness. There is no rigidity and no rebound. No hernia. Hernia " confirmed negative in the ventral area, confirmed negative in the right inguinal area and confirmed negative in the left inguinal area.                  Assessment/Plan   Problem List Items Addressed This Visit     Crohn's disease of colon with complication (CMS/HCC) (HCC) - Primary     Mr. Ritchie is doing well status post laparoscopic loop ileostomy and blowhole ostomy for complicated Crohn's colitis.  From a surgical standpoint he is doing well.  His ostomy is functioning nicely.  He will continue with an ostomy appliance over the blowhole site until the drainage decreases.  At that time he can apply a dry dressing.  He has started on Stelara.  We will see him back in 1 month to assess his progression following his infusion.               Return in about 4 weeks (around 7/8/2019) for Post-operative follow-up.       James Hernandez MD

## 2019-06-10 NOTE — ASSESSMENT & PLAN NOTE
Mr. Ritchie is doing well status post laparoscopic loop ileostomy and blowhole ostomy for complicated Crohn's colitis.  From a surgical standpoint he is doing well.  His ostomy is functioning nicely.  He will continue with an ostomy appliance over the blowhole site until the drainage decreases.  At that time he can apply a dry dressing.  He has started on Stelara.  We will see him back in 1 month to assess his progression following his infusion.

## 2019-06-10 NOTE — LETTER
2019     Harshad BEST MD   [facility]  [address]  [facility] [address]    Patient: Layton Ritchie Jr.  YOB: 1960  Date of Visit: 6/10/2019      Dear Dr. La:    Thank you for referring Layton Ritchie Jr. to me for evaluation. Below are my notes for this consultation.    If you have questions, please do not hesitate to call me. I look forward to following your patient along with you.         Sincerely,        aJmes Hernandez MD        CC: MD Mary Tapia Robert B, MD  2019  7:12 AM  Signed  Patient ID: Layton Ritchie Jr.                              : 1960  MRN: [id number]                                            Visit Date: 2019  Encounter Provider: James Hernandez  Referring Provider: No ref. provider found    Subjective   Chief Complaint: Post-op    Layton Ritchie Jr. is a 59 y.o. 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  He is doing well and tolerating a diet. He continues to have an extensive amount of mucus from below.  He is working with the home care nurse to learn the ostomy appliance changes. He had his first infusion of Stelara. He will continue with infusions every 8 weeks.    Colostomy/Ileostomy Laparoscopic Procedure Note     Hospital: [facility]  Medical Record Number:  [id number]  Patient Name:  Layton Ritchie Jr.  YOB: 1960   Date of Operation:  2019 - 2019     Procedure:    Colostomy/Ileostomy Laparoscopic  CPT(R) Code:  12943 - [place] LAP, SURG COLOSTOMY     Laparoscopic loop ileostomy     Anorectal exam under anesthesia        Pre-op Diagnosis     * Ulcerative pancolitis with other complication (CMS/HCC) [K51.018]       Post-op Diagnosis     * Ulcerative pancolitis with other complication (CMS/HCC) [K51.018]     Surgeon(s) and Role:     * Eduardo Hendricks DO - Resident - Assisting      * James Hernandez MD - Primary     Anesthesia: General     Staff:   Circulator: Hue Richards RN  Scrub Person: Monty Sutton      Clinical History: This 59-year-old gentleman has severe Crohn's colitis and now presents with megacolon and severe colitis symptoms.  He has Remicade antibodies and subtherapeutic serum levels.  Dr. Carlson plans to switch him to a different biologic, Stelara.  He presents now for diversion with loop ileostomy and blowhole colostomy.  If his colon heals on Stelara, we can reverse the stoma's.  If not, he will need completion proctocolectomy and conversion to end ileostomy.     Findings: Ulcerated anal canal, particularly posterior.  He has deep penetrating ulcers in the rectum.  I was not able to find any fistula to the perianal skin.  Intra-abdominally, the left colon had creeping fat and significant thickening particularly in the area of the descending colon.  The transverse colon was significantly dilated.  The small bowel was run from the ileocecal valve to the ligament of Treitz and no obvious Crohn's disease could be identified of the small bowel.     Procedure Details   The patient was placed in lithotomy position on the operating table after general anesthesia was induced.  The perianal area was prepped with Betadine and draped sterilely.  The anus and distal rectum was examined using anal retractors and the above findings were noted.  There was severe proctitis with deep discrete ulcerations there was no evidence of fistula to the perianal skin.  There was ulceration in the anal canal, particularly posteriorly.  He has an enlarged right posterior hemorrhoid.     The abdomen was then prepped with ChloraPrep and draped sterilely.  A vertical incision umbilicus is been the perineal cavity was entered.  Stay sutures were placed and the Johnson trocar was inserted.  Two 5 mm ports were placed in left abdomen.  All the port sites and the stoma sites were infiltrated with  half percent Marcaine.  The bowel was examined as noted and run from the ligament of Treitz to the ileocecal valve.  There was no evidence of obvious Crohn's disease of the small bowel.  The terminal ileum was identified and marked proximally and distally and then an umbilical tape placed under the mesentery.  We then created a trephine through the right rectus muscle and brought the ileum out for loop ileostomy.  We confirmed proper orientation of the ileum without twisting.  The abdomen was deflated.  A trephine was then created through the left rectus muscle in the upper abdomen over the distended transverse colon.  The serosa of the colon was sutured to the posterior rectus sheath circumferentially.  The umbilical fascia was closed with a figure-of-eight suture of 0 PDS and the skin incisions closed with 4-0 Vicryl subcuticular sutures and Dermabond.     The loop ileostomy was then matured in standard everting fashion to the dermis with interrupted 3-0 chromic sutures over a Eagle chao.  The transverse colon was then opened and a large amount of gas was expressed and the abdomen deflated.  The mucosa was then sutured circumferentially to the dermis with interrupted 3-0 Vicryl.  Stoma appliances were placed on both stomas.     Estimated Blood Loss: No blood loss documented.     Specimens:                   Order Name Source Comment Collection Info Order Time   PREPARE RBC       5/27/2019  7:55 AM    Transfusion indications  Pre-OP major surgery with bleeding risk          Has consent been obtained?  Yes                Drains:       Implants: * No implants in log *           Complications:  None; patient tolerated the procedure well.           Disposition: PACU - hemodynamically stable.           Condition: stable     James Hernandez MD  Phone Number: [phone]          Medications:   Current Outpatient Prescriptions:   •  tamsulosin (FLOMAX) 0.4 mg capsule, Take 1 capsule (0.4 mg total) by mouth daily., Disp:  "30 capsule, Rfl: 0    Past Medical History:  has a past medical history of Crohn's disease of colon with complication (CMS/HCC) (HCC) (5/26/2019).    Objective   Vitals: /66   Pulse 98   Ht 1.727 m (5' 8\")   Wt 74.8 kg (165 lb)   BMI 25.09 kg/m²   Physical Exam   Cardiovascular: Normal rate and regular rhythm.    Pulmonary/Chest: Breath sounds normal.   Abdominal: Soft. Normal appearance and bowel sounds are normal. He exhibits no distension and no mass. There is no tenderness. There is no rigidity and no rebound. No hernia. Hernia confirmed negative in the ventral area, confirmed negative in the right inguinal area and confirmed negative in the left inguinal area.                  Assessment/Plan   Problem List Items Addressed This Visit     Crohn's disease of colon with complication (CMS/HCC) (HCC) - Primary     Mr. Ritchie is doing well status post laparoscopic loop ileostomy and blowhole ostomy for complicated Crohn's colitis.  From a surgical standpoint he is doing well.  His ostomy is functioning nicely.  He will continue with an ostomy appliance over the blowhole site until the drainage decreases.  At that time he can apply a dry dressing.  He has started on Stelara.  We will see him back in 1 month to assess his progression following his infusion.               Return in about 4 weeks (around 7/8/2019) for Post-operative follow-up.       James Hernandez MD                     "

## 2019-06-18 ENCOUNTER — TELEPHONE (OUTPATIENT)
Dept: SURGERY | Facility: CLINIC | Age: 59
End: 2019-06-18

## 2019-06-18 NOTE — TELEPHONE ENCOUNTER
I clarified his stoma care.  He has a blowhole colostomy in the left upper quadrant.  This will put out mucus.  If he is having a lot of mucus, he may need pouching of this, otherwise it can be covered with gauze.

## 2019-07-11 ENCOUNTER — OFFICE VISIT (OUTPATIENT)
Dept: SURGERY | Facility: CLINIC | Age: 59
End: 2019-07-11
Payer: COMMERCIAL

## 2019-07-11 VITALS
HEIGHT: 68 IN | SYSTOLIC BLOOD PRESSURE: 141 MMHG | BODY MASS INDEX: 25.01 KG/M2 | WEIGHT: 165 LBS | HEART RATE: 80 BPM | DIASTOLIC BLOOD PRESSURE: 98 MMHG

## 2019-07-11 DIAGNOSIS — K50.119 CROHN'S DISEASE OF COLON WITH COMPLICATION (CMS/HCC): Primary | ICD-10-CM

## 2019-07-11 PROCEDURE — 99024 POSTOP FOLLOW-UP VISIT: CPT | Performed by: COLON & RECTAL SURGERY

## 2019-07-11 NOTE — ASSESSMENT & PLAN NOTE
Layton has healed completely from his surgery.  From my perspective, he has no physical restrictions.  We did talk about the prolapse from the blowhole which is not uncommon.  As long as he can control the pouching that should be fine.  He is now getting Stelara regularly and is due for his next infusion on August 5.  His colon will need to be reevaluated endoscopically at some point once we feel that the Stelara effect should be maximized.  If he is getting good healing of the colon, then we can consider stoma closure.  If he is really not making clinical progress despite the change in the class of his biologic, we would need to consider completion colectomy with permanent end ileostomy.  I would like to see him back in September after he has some time to recover from his next Stelara infusion.

## 2019-07-11 NOTE — LETTER
"  Dear Leonard,    I saw Layton Ritchie Jr. in the office today in follow-up. Please see my note below.    If you have any additional questions, please do not hesitate to call me.    Sincerely,    Mk Hernandez MD      _____________________________________      Colorectal Surgery Follow-up    Subjective     Layton Ritchie Jr. is a 59 y.o. male who is now 2 months out from his diverting loop ileostomy, and blowhole colostomy for his severe Crohn's colitis with megacolon.    He is feeling better than he has in a very long time.  His energy level is back to normal.  He is still having mucus from the rectum but he does not have the urgency that he had before and not having the abdominal pain.  His ileostomy is working well.  He is not having any peristomal skin problems and not having leaking problems.  He is still keeping a pouch on the mucous fistula of the blowhole colostomy, because he gets some occasional leakage from this and does not like the odor even though the output volume is minimal.    Medical History:   Past Medical History:   Diagnosis Date   • Crohn's disease of colon with complication (CMS/[facility]) ([facility]) 5/26/2019       Surgical History: No past surgical history on file.    Allergies: Patient has no known allergies.    Current Medications:  •  tamsulosin    Objective     Physicial Exam  BP (!) 141/98   Pulse 80   Ht 1.727 m (5' 8\")   Wt 74.8 kg (165 lb)   BMI 25.09 kg/m²      Physical Exam   Cardiovascular: Normal rate and regular rhythm.    Pulmonary/Chest: Breath sounds normal.   Abdominal: Soft. He exhibits no distension and no mass. There is no tenderness.   Genitourinary:   Genitourinary Comments: The ileostomy is very healthy in the right abdomen.  There is no peristomal skin excoriation or ulceration.  The blowhole colostomy in the left upper quadrant looks healthy with some very mild prolapse.  The rest the abdomen is soft.  He has no tenderness in the left lower quadrant or flank. "           Assessment     Problem List Items Addressed This Visit     Crohn's disease of colon with complication (CMS/HCC) (HCC) - Primary    Current Assessment & Plan     Layton has healed completely from his surgery.  From my perspective, he has no physical restrictions.  We did talk about the prolapse from the blowhole which is not uncommon.  As long as he can control the pouching that should be fine.  He is now getting Stelara regularly and is due for his next infusion on August 5.  His colon will need to be reevaluated endoscopically at some point once we feel that the Stelara effect should be maximized.  If he is getting good healing of the colon, then we can consider stoma closure.  If he is really not making clinical progress despite the change in the class of his biologic, we would need to consider completion colectomy with permanent end ileostomy.  I would like to see him back in September after he has some time to recover from his next Stelara infusion.                     James Hernandez MD

## 2019-07-11 NOTE — PROGRESS NOTES
"Colorectal Surgery Follow-up    Subjective     Layton Ritchie Jr. is a 59 y.o. male who is now 2 months out from his diverting loop ileostomy, and blowhole colostomy for his severe Crohn's colitis with megacolon.    He is feeling better than he has in a very long time.  His energy level is back to normal.  He is still having mucus from the rectum but he does not have the urgency that he had before and not having the abdominal pain.  His ileostomy is working well.  He is not having any peristomal skin problems and not having leaking problems.  He is still keeping a pouch on the mucous fistula of the blowhole colostomy, because he gets some occasional leakage from this and does not like the odor even though the output volume is minimal.    Medical History:   Past Medical History:   Diagnosis Date   • Crohn's disease of colon with complication (CMS/HCC) (HCC) 5/26/2019       Surgical History: No past surgical history on file.    Allergies: Patient has no known allergies.    Current Medications:  •  tamsulosin    Objective     Physicial Exam  BP (!) 141/98   Pulse 80   Ht 1.727 m (5' 8\")   Wt 74.8 kg (165 lb)   BMI 25.09 kg/m²     Physical Exam   Cardiovascular: Normal rate and regular rhythm.    Pulmonary/Chest: Breath sounds normal.   Abdominal: Soft. He exhibits no distension and no mass. There is no tenderness.   Genitourinary:   Genitourinary Comments: The ileostomy is very healthy in the right abdomen.  There is no peristomal skin excoriation or ulceration.  The blowhole colostomy in the left upper quadrant looks healthy with some very mild prolapse.  The rest the abdomen is soft.  He has no tenderness in the left lower quadrant or flank.           Assessment     Problem List Items Addressed This Visit     Crohn's disease of colon with complication (CMS/HCC) (HCC) - Primary    Current Assessment & Plan     Layton has healed completely from his surgery.  From my perspective, he has no physical restrictions.  We " did talk about the prolapse from the blowhole which is not uncommon.  As long as he can control the pouching that should be fine.  He is now getting Stelara regularly and is due for his next infusion on August 5.  His colon will need to be reevaluated endoscopically at some point once we feel that the Stelara effect should be maximized.  If he is getting good healing of the colon, then we can consider stoma closure.  If he is really not making clinical progress despite the change in the class of his biologic, we would need to consider completion colectomy with permanent end ileostomy.  I would like to see him back in September after he has some time to recover from his next Stelara infusion.                     James Hernandez MD

## 2019-09-12 ENCOUNTER — OFFICE VISIT (OUTPATIENT)
Dept: SURGERY | Facility: CLINIC | Age: 59
End: 2019-09-12
Payer: COMMERCIAL

## 2019-09-12 VITALS
HEART RATE: 70 BPM | BODY MASS INDEX: 26.52 KG/M2 | SYSTOLIC BLOOD PRESSURE: 146 MMHG | DIASTOLIC BLOOD PRESSURE: 94 MMHG | HEIGHT: 68 IN | WEIGHT: 175 LBS

## 2019-09-12 DIAGNOSIS — K50.119 CROHN'S DISEASE OF COLON WITH COMPLICATION (CMS/HCC): Primary | ICD-10-CM

## 2019-09-12 PROCEDURE — 99213 OFFICE O/P EST LOW 20 MIN: CPT | Performed by: COLON & RECTAL SURGERY

## 2019-09-12 NOTE — PROGRESS NOTES
"Colorectal Surgery Follow-up    Subjective     Layton Ritchie Jr. is a 59 y.o. male who is 3-1/2 months out from a loop ileostomy and blowhole colostomy for severe Crohn's colitis with megacolon.    He is doing very well with his ileostomy.  He has some occasional peristomal irritation but overall he is handling it well.  His energy level is basically back to normal.  Overall, he feels well.  He is having minimal mucus output from his rectum.    His biggest complaint is prolapse from his blowhole colostomy.  This is particularly prominent when he is standing and being physically active.  He is always able to get it to reduce but sometimes he gets fairly firm.    He continues on IV Stelara he has had 3 infusions at this point.  Dr. Carlson plans to do a colonoscopy at the end of October to assess his response to the new medication.  He had been receiving Remicade, but clearly had antibodies with low Remicade levels.    Medical History:   Past Medical History:   Diagnosis Date   • Crohn's disease of colon with complication (CMS/[facility]) ([facility]) 5/26/2019       Surgical History: History reviewed. No pertinent surgical history.    Allergies: Patient has no known allergies.    Current Medications:  •  tamsulosin    Objective     Physicial Exam  BP (!) 146/94   Pulse 70   Ht 1.727 m (5' 8\")   Wt 79.4 kg (175 lb)   BMI 26.61 kg/m²     Physical Exam   Constitutional: He is oriented to person, place, and time. He appears well-developed and well-nourished.   HENT:   Head: Normocephalic and atraumatic.   Eyes: Pupils are equal, round, and reactive to light. EOM are normal.   Neck: Normal range of motion. Neck supple.   Cardiovascular: Normal rate, regular rhythm, normal heart sounds and intact distal pulses.    No murmur heard.  Pulmonary/Chest: Effort normal and breath sounds normal. No respiratory distress. He has no wheezes.   Abdominal: Soft. He exhibits no distension and no mass. There is no tenderness. No hernia.   The " ileostomy is healthy in the right abdomen.  The eferrent portion of it is protruding some.  There is some mild peristomal skin excoriation particularly inferiorly and to the left.  He has a small amount of fungal dermatitis underneath the wafer.  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  The peristomal skin is healthy.  The rest the abdomen is soft and nontender   Musculoskeletal: Normal range of motion. He exhibits no edema or tenderness.   Lymphadenopathy:     He has no cervical adenopathy.   Neurological: He is alert and oriented to person, place, and time. No cranial nerve deficit.   Skin: Skin is warm and dry. Capillary refill takes less than 2 seconds. No rash noted.   Psychiatric: He has a normal mood and affect. His behavior is normal.   Vitals reviewed.          Assessment     Problem List Items Addressed This Visit     Crohn's disease of colon with complication (CMS/HCC) (Formerly [facility]) - Primary    Current Assessment & Plan     Layton is doing very well with his ileostomy.  He feels much better.  Hopefully, his colon is healing with the Stelara.  I went over some stoma care issues with him and he will work with using an Rody seal to protect the peristomal skin.  We discussed the prolapse of the mucous fistula.  He may try using an abdominal binder to help hold this and when he has being active during the day.  I do not want to do any surgical revision of this if we can help it.  That will limit our surgical options down the road.  Hopefully, the colonoscopy next month will show good response to the Stelara and then we can consider ileostomy and blowhole colostomy closure.  In the worse case scenario, he does not get healing of the colon, and then we would do a completion proctocolectomy with end ileostomy.                     James Hernandez MD

## 2019-09-12 NOTE — LETTER
"  Dear rKistofer,    I saw Layton Ritchie Jr. in the office today in follow-up. Please see my note below.    If you have any additional questions, please do not hesitate to call me.    Sincerely,    Mk Hernandez MD      _____________________________________      Colorectal Surgery Follow-up    Subjective     Layton Ritchie Jr. is a 59 y.o. male who is 3-1/2 months out from a loop ileostomy and blowhole colostomy for severe Crohn's colitis with megacolon.    He is doing very well with his ileostomy.  He has some occasional peristomal irritation but overall he is handling it well.  His energy level is basically back to normal.  Overall, he feels well.  He is having minimal mucus output from his rectum.    His biggest complaint is prolapse from his blowhole colostomy.  This is particularly prominent when he is standing and being physically active.  He is always able to get it to reduce but sometimes he gets fairly firm.    He continues on IV Stelara he has had 3 infusions at this point.  Dr. Carlson plans to do a colonoscopy at the end of October to assess his response to the new medication.  He had been receiving Remicade, but clearly had antibodies with low Remicade levels.    Medical History:   Past Medical History:   Diagnosis Date   • Crohn's disease of colon with complication (CMS/[facility]) ([facility]) 5/26/2019       Surgical History: History reviewed. No pertinent surgical history.    Allergies: Patient has no known allergies.    Current Medications:  •  tamsulosin    Objective     Physicial Exam  BP (!) 146/94   Pulse 70   Ht 1.727 m (5' 8\")   Wt 79.4 kg (175 lb)   BMI 26.61 kg/m²      Physical Exam   Constitutional: He is oriented to person, place, and time. He appears well-developed and well-nourished.   HENT:   Head: Normocephalic and atraumatic.   Eyes: Pupils are equal, round, and reactive to light. EOM are normal.   Neck: Normal range of motion. Neck supple.   Cardiovascular: Normal rate, regular " rhythm, normal heart sounds and intact distal pulses.    No murmur heard.  Pulmonary/Chest: Effort normal and breath sounds normal. No respiratory distress. He has no wheezes.   Abdominal: Soft. He exhibits no distension and no mass. There is no tenderness. No hernia.   The ileostomy is healthy in the right abdomen.  The eferrent portion of it is protruding some.  There is some mild peristomal skin excoriation particularly inferiorly and to the left.  He has a small amount of fungal dermatitis underneath the wafer.  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  The peristomal skin is healthy.  The rest the abdomen is soft and nontender   Musculoskeletal: Normal range of motion. He exhibits no edema or tenderness.   Lymphadenopathy:     He has no cervical adenopathy.   Neurological: He is alert and oriented to person, place, and time. No cranial nerve deficit.   Skin: Skin is warm and dry. Capillary refill takes less than 2 seconds. No rash noted.   Psychiatric: He has a normal mood and affect. His behavior is normal.   Vitals reviewed.          Assessment     Problem List Items Addressed This Visit     Crohn's disease of colon with complication (CMS/HCC) (Former[facility]) - Primary    Current Assessment & Plan     Layton is doing very well with his ileostomy.  He feels much better.  Hopefully, his colon is healing with the Stelara.  I went over some stoma care issues with him and he will work with using an Rody seal to protect the peristomal skin.  We discussed the prolapse of the mucous fistula.  He may try using an abdominal binder to help hold this and when he has being active during the day.  I do not want to do any surgical revision of this if we can help it.  That will limit our surgical options down the road.  Hopefully, the colonoscopy next month will show good response to the Stelara and then we can consider ileostomy and blowhole colostomy closure.  In the worse  case scenario, he does not get healing of the colon, and then we would do a completion proctocolectomy with end ileostomy.                     James Hernandez MD

## 2019-09-12 NOTE — ASSESSMENT & PLAN NOTE
Layton is doing very well with his ileostomy.  He feels much better.  Hopefully, his colon is healing with the Stelara.  I went over some stoma care issues with him and he will work with using an Rody seal to protect the peristomal skin.  We discussed the prolapse of the mucous fistula.  He may try using an abdominal binder to help hold this and when he has being active during the day.  I do not want to do any surgical revision of this if we can help it.  That will limit our surgical options down the road.  Hopefully, the colonoscopy next month will show good response to the Stelara and then we can consider ileostomy and blowhole colostomy closure.  In the worse case scenario, he does not get healing of the colon, and then we would do a completion proctocolectomy with end ileostomy.

## 2019-11-18 ENCOUNTER — OFFICE VISIT (OUTPATIENT)
Dept: SURGERY | Facility: CLINIC | Age: 59
End: 2019-11-18
Payer: COMMERCIAL

## 2019-11-18 VITALS
SYSTOLIC BLOOD PRESSURE: 139 MMHG | DIASTOLIC BLOOD PRESSURE: 91 MMHG | HEART RATE: 73 BPM | WEIGHT: 175 LBS | HEIGHT: 68 IN | BODY MASS INDEX: 26.52 KG/M2

## 2019-11-18 DIAGNOSIS — K50.119 CROHN'S DISEASE OF COLON WITH COMPLICATION (CMS/HCC): Primary | ICD-10-CM

## 2019-11-18 PROCEDURE — 99215 OFFICE O/P EST HI 40 MIN: CPT | Performed by: COLON & RECTAL SURGERY

## 2019-11-18 NOTE — LETTER
"  Dear Kristofer,    I saw Layton Ritchie Jr. in the office today in follow-up. Please see my note below.    If you have any additional questions, please do not hesitate to call me.    Sincerely,    Mk Hernandez MD      _____________________________________      Colorectal Surgery Follow-up    Subjective     Layton Ritchie Jr. is a 59 y.o. male who returns in follow-up for his Crohn's colitis status post ileostomy and blowhole colostomy on 5/27/2019.  He has continued on Stelara.  He overall feels well.  Apparently, his CRP has completely returned to normal.  He had a colonoscopy performed by Dr. Carlson on 10/28/2019.  This showed a diffuse area of severely congested, and nodular mucosa with pseudopolyps in the descending colon and rectosigmoid colon.  There was also inflammation in the cecum.  The a sending and transverse colon appeared normal.  Biopsies showed chronic active colitis in the cecum as well as mild to moderately active colitis in the left colon and rectum.    He is doing well with his ileostomy although he is troubled by the protrusion of the efferent portion of the loop stoma and is annoyed at where the stoma is in relationship to where he wears his belt.  His big complaint is prolapse of the blowhole colostomy which can be quite prominent.    Medical History:   Past Medical History:   Diagnosis Date   • Crohn's disease of colon with complication (CMS/Former[facility]) 5/26/2019       Surgical History:   Past Surgical History:   Procedure Laterality Date   • ILEOSTOMY  05/27/2019       Allergies: Patient has no known allergies.    Current Medications:  •  tamsulosin    Objective     Physicial Exam  Visit Vitals  BP (!) 139/91   Pulse 73   Ht 1.727 m (5' 8\")   Wt 79.4 kg (175 lb)   BMI 26.61 kg/m²       Physical Exam   Constitutional: He is oriented to person, place, and time. He appears well-developed and well-nourished.   HENT:   Head: Normocephalic and atraumatic.   Eyes: Pupils are equal, round, and " reactive to light. EOM are normal.   Neck: Normal range of motion. Neck supple.   Cardiovascular: Normal rate, regular rhythm, normal heart sounds and intact distal pulses.   No murmur heard.  Pulmonary/Chest: Effort normal and breath sounds normal. No respiratory distress. He has no wheezes.   Abdominal: Soft. He exhibits no distension and no mass. There is no tenderness. No hernia.   The ileostomy and the peristomal skin is healthy, but he does have prolapse of the efferent portion of the significant prolapse of the loop.  Transverse colostomy but healthy-appearing.  The rest of the abdomen is soft and nontender including the left abdomen and left lower quadrant.   Genitourinary:   Genitourinary Comments: Perianal skin looks normal without significant inflammatory skin tags or signs of fistula or abscess.   Musculoskeletal: Normal range of motion. He exhibits no edema or tenderness.   Lymphadenopathy:     He has no cervical adenopathy.   Neurological: He is alert and oriented to person, place, and time. No cranial nerve deficit.   Skin: Skin is warm and dry. Capillary refill takes less than 2 seconds. No rash noted.   Psychiatric: He has a normal mood and affect. His behavior is normal.   Vitals reviewed.          Assessment     Problem List Items Addressed This Visit        Digestive    Crohn's disease of colon with complication (CMS/HCC) - Primary    Current Assessment & Plan     From the colonoscopy report, it does not appear that he has had significant endoscopic improvement in his colitis despite diversion and change in biologics.  Dr. Carlson was out of town, but I would like to talk to him directly about this.  He told Mr. Ritchie that he felt that waiting longer and giving more time for the Stelara to taking effect would be reasonable and they discussed repeating his colonoscopy in 3 months.  The issue in my mind is ultimately he will develop diversion colitis that will affect the endoscopic appearance.   It may be worth treating him with short chain fatty acids for some time prior to repeating the colonoscopy.    I am highly suspicious that he is not going to heal his colitis and we will be looking at doing a completion colectomy and converting him to an end ileostomy.  He certainly does not want to go back to the quality of life that he had prior to his stoma.  If we were to pursue that, he would be interested in relocating the ileostomy to higher in the abdomen where it does not interfere with his close as much.  I would want him to see the interstomal therapist to help guide that process if it comes to that.  We talked fairly extensively about that surgery and expected recovery.  We discussed risks and benefits of surgery at length.  We specifically discussed perineal wound healing, pelvic nerve dysfunction including a rectal function and urinary function issues.  We also discussed the issues of incisional hernias at the stoma sites.    Until I talk to Dr. Carlson, the plan will be for him to repeat the colonoscopy at the end of January or early February.                       James Hernandez MD

## 2019-11-19 NOTE — ASSESSMENT & PLAN NOTE
From the colonoscopy report, it does not appear that he has had significant endoscopic improvement in his colitis despite diversion and change in biologics.  Dr. Carlson was out of town, but I would like to talk to him directly about this.  He told Mr. Ritchie that he felt that waiting longer and giving more time for the Stelara to taking effect would be reasonable and they discussed repeating his colonoscopy in 3 months.  The issue in my mind is ultimately he will develop diversion colitis that will affect the endoscopic appearance.  It may be worth treating him with short chain fatty acids for some time prior to repeating the colonoscopy.    I am highly suspicious that he is not going to heal his colitis and we will be looking at doing a completion colectomy and converting him to an end ileostomy.  He certainly does not want to go back to the quality of life that he had prior to his stoma.  If we were to pursue that, he would be interested in relocating the ileostomy to higher in the abdomen where it does not interfere with his close as much.  I would want him to see the interstomal therapist to help guide that process if it comes to that.  We talked fairly extensively about that surgery and expected recovery.  We discussed risks and benefits of surgery at length.  We specifically discussed perineal wound healing, pelvic nerve dysfunction including a rectal function and urinary function issues.  We also discussed the issues of incisional hernias at the stoma sites.    Until I talk to Dr. Carlson, the plan will be for him to repeat the colonoscopy at the end of January or early February.

## 2019-11-19 NOTE — PROGRESS NOTES
"Colorectal Surgery Follow-up    Subjective     Layton Ritchie Jr. is a 59 y.o. male who returns in follow-up for his Crohn's colitis status post ileostomy and blowhole colostomy on 5/27/2019.  He has continued on Stelara.  He overall feels well.  Apparently, his CRP has completely returned to normal.  He had a colonoscopy performed by Dr. Carlson on 10/28/2019.  This showed a diffuse area of severely congested, and nodular mucosa with pseudopolyps in the descending colon and rectosigmoid colon.  There was also inflammation in the cecum.  The a sending and transverse colon appeared normal.  Biopsies showed chronic active colitis in the cecum as well as mild to moderately active colitis in the left colon and rectum.    He is doing well with his ileostomy although he is troubled by the protrusion of the efferent portion of the loop stoma and is annoyed at where the stoma is in relationship to where he wears his belt.  His big complaint is prolapse of the blowhole colostomy which can be quite prominent.    Medical History:   Past Medical History:   Diagnosis Date   • Crohn's disease of colon with complication (CMS/HCC) 5/26/2019       Surgical History:   Past Surgical History:   Procedure Laterality Date   • ILEOSTOMY  05/27/2019       Allergies: Patient has no known allergies.    Current Medications:  •  tamsulosin    Objective     Physicial Exam  Visit Vitals  BP (!) 139/91   Pulse 73   Ht 1.727 m (5' 8\")   Wt 79.4 kg (175 lb)   BMI 26.61 kg/m²       Physical Exam   Constitutional: He is oriented to person, place, and time. He appears well-developed and well-nourished.   HENT:   Head: Normocephalic and atraumatic.   Eyes: Pupils are equal, round, and reactive to light. EOM are normal.   Neck: Normal range of motion. Neck supple.   Cardiovascular: Normal rate, regular rhythm, normal heart sounds and intact distal pulses.   No murmur heard.  Pulmonary/Chest: Effort normal and breath sounds normal. No respiratory " distress. He has no wheezes.   Abdominal: Soft. He exhibits no distension and no mass. There is no tenderness. No hernia.   The ileostomy and the peristomal skin is healthy, but he does have prolapse of the efferent portion of the significant prolapse of the loop.  Transverse colostomy but healthy-appearing.  The rest of the abdomen is soft and nontender including the left abdomen and left lower quadrant.   Genitourinary:   Genitourinary Comments: Perianal skin looks normal without significant inflammatory skin tags or signs of fistula or abscess.   Musculoskeletal: Normal range of motion. He exhibits no edema or tenderness.   Lymphadenopathy:     He has no cervical adenopathy.   Neurological: He is alert and oriented to person, place, and time. No cranial nerve deficit.   Skin: Skin is warm and dry. Capillary refill takes less than 2 seconds. No rash noted.   Psychiatric: He has a normal mood and affect. His behavior is normal.   Vitals reviewed.          Assessment     Problem List Items Addressed This Visit        Digestive    Crohn's disease of colon with complication (CMS/HCC) - Primary    Current Assessment & Plan     From the colonoscopy report, it does not appear that he has had significant endoscopic improvement in his colitis despite diversion and change in biologics.  Dr. Carlson was out of town, but I would like to talk to him directly about this.  He told Mr. Ritchie that he felt that waiting longer and giving more time for the Stelara to taking effect would be reasonable and they discussed repeating his colonoscopy in 3 months.  The issue in my mind is ultimately he will develop diversion colitis that will affect the endoscopic appearance.  It may be worth treating him with short chain fatty acids for some time prior to repeating the colonoscopy.    I am highly suspicious that he is not going to heal his colitis and we will be looking at doing a completion colectomy and converting him to an end  ileostomy.  He certainly does not want to go back to the quality of life that he had prior to his stoma.  If we were to pursue that, he would be interested in relocating the ileostomy to higher in the abdomen where it does not interfere with his close as much.  I would want him to see the interstomal therapist to help guide that process if it comes to that.  We talked fairly extensively about that surgery and expected recovery.  We discussed risks and benefits of surgery at length.  We specifically discussed perineal wound healing, pelvic nerve dysfunction including a rectal function and urinary function issues.  We also discussed the issues of incisional hernias at the stoma sites.    Until I talk to Dr. Carlson, the plan will be for him to repeat the colonoscopy at the end of January or early February.                       James Hernandez MD

## 2020-02-04 ENCOUNTER — TELEPHONE (OUTPATIENT)
Dept: ENDOSCOPY | Facility: HOSPITAL | Age: 60
End: 2020-02-04

## 2020-02-06 NOTE — TELEPHONE ENCOUNTER
He should take an enema to try to washout mucus.  There is no way to really get a better prep.  Dr. Carlson will have to deal with what ever other mucus he will find in the colon.

## 2020-02-17 ENCOUNTER — OFFICE VISIT (OUTPATIENT)
Dept: SURGERY | Facility: CLINIC | Age: 60
End: 2020-02-17
Payer: COMMERCIAL

## 2020-02-17 VITALS
HEART RATE: 90 BPM | SYSTOLIC BLOOD PRESSURE: 155 MMHG | BODY MASS INDEX: 28.79 KG/M2 | DIASTOLIC BLOOD PRESSURE: 104 MMHG | WEIGHT: 190 LBS | HEIGHT: 68 IN

## 2020-02-17 DIAGNOSIS — K50.119 CROHN'S DISEASE OF COLON WITH COMPLICATION (CMS/HCC): Primary | ICD-10-CM

## 2020-02-17 PROCEDURE — 99215 OFFICE O/P EST HI 40 MIN: CPT | Performed by: COLON & RECTAL SURGERY

## 2020-02-17 RX ORDER — ALVIMOPAN 12 MG/1
12 CAPSULE ORAL ONCE
Status: CANCELLED | OUTPATIENT
Start: 2020-02-17 | End: 2020-02-17

## 2020-02-17 RX ORDER — ACETAMINOPHEN 325 MG/1
975 TABLET ORAL ONCE
Status: CANCELLED | OUTPATIENT
Start: 2020-02-17 | End: 2020-02-17

## 2020-02-17 RX ORDER — CELECOXIB 100 MG/1
200 CAPSULE ORAL ONCE
Status: CANCELLED | OUTPATIENT
Start: 2020-02-17 | End: 2020-02-17

## 2020-02-17 RX ORDER — GABAPENTIN 100 MG/1
600 CAPSULE ORAL ONCE
Status: CANCELLED | OUTPATIENT
Start: 2020-02-17 | End: 2020-02-17

## 2020-02-17 RX ORDER — CEFAZOLIN SODIUM 2 G/50ML
2 SOLUTION INTRAVENOUS
Status: CANCELLED | OUTPATIENT
Start: 2020-03-11

## 2020-02-17 RX ORDER — METRONIDAZOLE 500 MG/100ML
500 INJECTION, SOLUTION INTRAVENOUS
Status: CANCELLED | OUTPATIENT
Start: 2020-03-11

## 2020-02-17 NOTE — LETTER
"  Dear Kristofer,    I saw Layton Ritchie Jr. in the office today in follow-up. Please see my note below.    If you have any additional questions, please do not hesitate to call me.    Sincerely,    Mk Hernandez MD      _____________________________________      Colorectal Surgery Follow-up    Subjective     Layton Ritchie Jr. is a 59 y.o. male who had diverting loop ileostomy with blowhole colostomy in May 2019 for fulminant Crohn's colitis with megacolon.  He improved significantly but on his last endoscopy in October 2019, he did not have significant endoscopic improvement in his colitis despite the Stelara.    He had a repeat colonoscopy with Dr. Carlson on 2/10.  Unfortunately, he still has significant colitis in the rectum, rectosigmoid colon, descending colon and transverse colon.  A descending colon polyp was removed which was a pseudopolyp.  All the biopsy showed moderately active inflammatory bowel disease and no precancerous changes.    He is very frustrated with the prolapse from his colostomy.  That is his major complaint.  He is happy with the ileostomy and does not want to pursue any re-anastomosis which would really not make sense given the endoscopic findings.  His complaint with the ileostomy is that it is current location interferes with how he wears his close.    He has not been able to go back to work because of the prolapsing colostomy.  If he lifts anything it bulges out and is very uncomfortable.  He is a  and does swimming pool repairs.    Medical History:   Past Medical History:   Diagnosis Date   • Crohn's disease of colon with complication (CMS/HCC) 5/26/2019       Surgical History:   Past Surgical History:   Procedure Laterality Date   • ILEOSTOMY  05/27/2019       Allergies: Patient has no known allergies.    Current Medications:  •  tamsulosin    Objective     Physicial Exam  Visit Vitals  BP (!) 155/104   Pulse 90   Ht 1.727 m (5' 8\")   Wt 86.2 kg (190 lb) "   BMI 28.89 kg/m²       Physical Exam   Constitutional: He is oriented to person, place, and time. He appears well-developed and well-nourished.   HENT:   Head: Normocephalic and atraumatic.   Eyes: Pupils are equal, round, and reactive to light. EOM are normal.   Neck: Normal range of motion. Neck supple.   Cardiovascular: Normal rate, regular rhythm, normal heart sounds and intact distal pulses.   No murmur heard.  Pulmonary/Chest: Effort normal and breath sounds normal. No respiratory distress. He has no wheezes.   Abdominal: Soft. He exhibits no distension and no mass. There is no tenderness. No hernia.   Significant prolapse of the blowhole loop colostomy in the left upper quadrant.  This does reduce with some difficulty.  The mucosa is healthy.  He also has prolapse of the ileostomy but it is not as problematic.  The ileostomy is also healthy.  The rest of the abdomen is soft and nontender with no palpable masses.   Genitourinary:   Genitourinary Comments: The external anus itself looks normal.  He has good anal tone but there is significant stricturing in the top of the anal canal.   Musculoskeletal: Normal range of motion. He exhibits no edema or tenderness.   Lymphadenopathy:     He has no cervical adenopathy.   Neurological: He is alert and oriented to person, place, and time. No cranial nerve deficit.   Skin: Skin is warm and dry. Capillary refill takes less than 2 seconds. No rash noted.   Psychiatric: He has a normal mood and affect. His behavior is normal.   Vitals reviewed.          Assessment     Problem List Items Addressed This Visit        Digestive    Crohn's disease of colon with complication (CMS/[facility]) - Primary    Current Assessment & Plan     Layton has not had enough healing of his colon despite the Biologics and diversion to do a ileostomy closure.  He is very happy with the ileostomy in general and wants to proceed with completion proctocolectomy and convert his loop ileostomy to an end  ileostomy.  He would like to move it up on the abdomen away from his belt line.  His biggest problem is the prolapsing colostomy.  We should be able to do a minimally invasive procedure and do an intersphincteric proctectomy.  We talked at length about the procedure including the risks of bladder or sexual dysfunction with dissecting the rectum out of the pelvis and potentially affecting the autonomic nerves.  We also discussed healing problems at the anus and incisional hernia.    We have discussed minimally invasive colectomy at length.  We discussed the procedure and the expected hospital stay and recovery.  We discussed the ERAS pathway.  We have discussed the risks and benefits of surgery, including ureter injury, spleen injury, intestinal obstruction, wound infection and bleeding issues.  We also discussed medical issues such as MI, pneumonia, DVT/PE, and stroke. he understands and wishes to proceed.    We will set him up to meet with the interstomal therapist to choose the site of the relocated, end ileostomy         Relevant Orders    Case request operating room: PROCTOCOLECTOMY TOTAL LAPAROSCOPIC JERRICA, ILEOSTOMY RESVISION (Completed)                James Hernandez MD

## 2020-02-17 NOTE — ASSESSMENT & PLAN NOTE
Layton has not had enough healing of his colon despite the Biologics and diversion to do a ileostomy closure.  He is very happy with the ileostomy in general and wants to proceed with completion proctocolectomy and convert his loop ileostomy to an end ileostomy.  He would like to move it up on the abdomen away from his belt line.  His biggest problem is the prolapsing colostomy.  We should be able to do a minimally invasive procedure and do an intersphincteric proctectomy.  We talked at length about the procedure including the risks of bladder or sexual dysfunction with dissecting the rectum out of the pelvis and potentially affecting the autonomic nerves.  We also discussed healing problems at the anus and incisional hernia.    We have discussed minimally invasive colectomy at length.  We discussed the procedure and the expected hospital stay and recovery.  We discussed the ERAS pathway.  We have discussed the risks and benefits of surgery, including ureter injury, spleen injury, intestinal obstruction, wound infection and bleeding issues.  We also discussed medical issues such as MI, pneumonia, DVT/PE, and stroke. he understands and wishes to proceed.    We will set him up to meet with the interstomal therapist to choose the site of the relocated, end ileostomy

## 2020-02-17 NOTE — H&P (VIEW-ONLY)
"Colorectal Surgery Follow-up    Subjective     Layton Ritchie Jr. is a 59 y.o. male who had diverting loop ileostomy with blowhole colostomy in May 2019 for fulminant Crohn's colitis with megacolon.  He improved significantly but on his last endoscopy in October 2019, he did not have significant endoscopic improvement in his colitis despite the Stelara.    He had a repeat colonoscopy with Dr. Carlson on 2/10.  Unfortunately, he still has significant colitis in the rectum, rectosigmoid colon, descending colon and transverse colon.  A descending colon polyp was removed which was a pseudopolyp.  All the biopsy showed moderately active inflammatory bowel disease and no precancerous changes.    He is very frustrated with the prolapse from his colostomy.  That is his major complaint.  He is happy with the ileostomy and does not want to pursue any re-anastomosis which would really not make sense given the endoscopic findings.  His complaint with the ileostomy is that it is current location interferes with how he wears his close.    He has not been able to go back to work because of the prolapsing colostomy.  If he lifts anything it bulges out and is very uncomfortable.  He is a  and does swimming pool repairs.    Medical History:   Past Medical History:   Diagnosis Date   • Crohn's disease of colon with complication (CMS/HCC) 5/26/2019       Surgical History:   Past Surgical History:   Procedure Laterality Date   • ILEOSTOMY  05/27/2019       Allergies: Patient has no known allergies.    Current Medications:  •  tamsulosin    Objective     Physicial Exam  Visit Vitals  BP (!) 155/104   Pulse 90   Ht 1.727 m (5' 8\")   Wt 86.2 kg (190 lb)   BMI 28.89 kg/m²       Physical Exam   Constitutional: He is oriented to person, place, and time. He appears well-developed and well-nourished.   HENT:   Head: Normocephalic and atraumatic.   Eyes: Pupils are equal, round, and reactive to light. EOM are normal.   Neck: " Normal range of motion. Neck supple.   Cardiovascular: Normal rate, regular rhythm, normal heart sounds and intact distal pulses.   No murmur heard.  Pulmonary/Chest: Effort normal and breath sounds normal. No respiratory distress. He has no wheezes.   Abdominal: Soft. He exhibits no distension and no mass. There is no tenderness. No hernia.   Significant prolapse of the blowhole loop colostomy in the left upper quadrant.  This does reduce with some difficulty.  The mucosa is healthy.  He also has prolapse of the ileostomy but it is not as problematic.  The ileostomy is also healthy.  The rest of the abdomen is soft and nontender with no palpable masses.   Genitourinary:   Genitourinary Comments: The external anus itself looks normal.  He has good anal tone but there is significant stricturing in the top of the anal canal.   Musculoskeletal: Normal range of motion. He exhibits no edema or tenderness.   Lymphadenopathy:     He has no cervical adenopathy.   Neurological: He is alert and oriented to person, place, and time. No cranial nerve deficit.   Skin: Skin is warm and dry. Capillary refill takes less than 2 seconds. No rash noted.   Psychiatric: He has a normal mood and affect. His behavior is normal.   Vitals reviewed.          Assessment     Problem List Items Addressed This Visit        Digestive    Crohn's disease of colon with complication (CMS/HCC) - Primary    Current Assessment & Plan     Layton has not had enough healing of his colon despite the Biologics and diversion to do a ileostomy closure.  He is very happy with the ileostomy in general and wants to proceed with completion proctocolectomy and convert his loop ileostomy to an end ileostomy.  He would like to move it up on the abdomen away from his belt line.  His biggest problem is the prolapsing colostomy.  We should be able to do a minimally invasive procedure and do an intersphincteric proctectomy.  We talked at length about the procedure  including the risks of bladder or sexual dysfunction with dissecting the rectum out of the pelvis and potentially affecting the autonomic nerves.  We also discussed healing problems at the anus and incisional hernia.    We have discussed minimally invasive colectomy at length.  We discussed the procedure and the expected hospital stay and recovery.  We discussed the ERAS pathway.  We have discussed the risks and benefits of surgery, including ureter injury, spleen injury, intestinal obstruction, wound infection and bleeding issues.  We also discussed medical issues such as MI, pneumonia, DVT/PE, and stroke. he understands and wishes to proceed.    We will set him up to meet with the interstomal therapist to choose the site of the relocated, end ileostomy         Relevant Orders    Case request operating room: PROCTOCOLECTOMY TOTAL LAPAROSCOPIC JERRICA, ILEOSTOMY RESVISION (Completed)                James Hernandez MD

## 2020-03-06 ENCOUNTER — APPOINTMENT (OUTPATIENT)
Dept: WOUND CARE | Facility: HOSPITAL | Age: 60
End: 2020-03-06
Attending: SURGERY
Payer: COMMERCIAL

## 2020-03-06 ENCOUNTER — DOCUMENTATION (OUTPATIENT)
Dept: WOUND CARE | Facility: HOSPITAL | Age: 60
End: 2020-03-06

## 2020-03-06 ENCOUNTER — APPOINTMENT (OUTPATIENT)
Dept: PREADMISSION TESTING | Facility: HOSPITAL | Age: 60
End: 2020-03-06
Attending: COLON & RECTAL SURGERY
Payer: COMMERCIAL

## 2020-03-06 VITALS
BODY MASS INDEX: 30.46 KG/M2 | HEART RATE: 75 BPM | HEIGHT: 68 IN | SYSTOLIC BLOOD PRESSURE: 145 MMHG | TEMPERATURE: 98.1 F | RESPIRATION RATE: 16 BRPM | OXYGEN SATURATION: 96 % | WEIGHT: 201 LBS | DIASTOLIC BLOOD PRESSURE: 82 MMHG

## 2020-03-06 DIAGNOSIS — Z01.818 ENCOUNTER FOR PREADMISSION TESTING: Primary | ICD-10-CM

## 2020-03-06 LAB
ABO + RH BLD: NORMAL
ANION GAP SERPL CALC-SCNC: 11 MEQ/L (ref 3–15)
ATRIAL RATE: 69
BLD GP AB SCN SERPL QL: NEGATIVE
BLOOD BANK CMNT PATIENT-IMP: NORMAL
BUN SERPL-MCNC: 12 MG/DL (ref 8–20)
CALCIUM SERPL-MCNC: 9 MG/DL (ref 8.9–10.3)
CHLORIDE SERPL-SCNC: 104 MEQ/L (ref 98–109)
CO2 SERPL-SCNC: 22 MEQ/L (ref 22–32)
CREAT SERPL-MCNC: 0.9 MG/DL (ref 0.8–1.3)
D AG BLD QL: POSITIVE
ERYTHROCYTE [DISTWIDTH] IN BLOOD BY AUTOMATED COUNT: 15.1 % (ref 11.6–14.4)
GFR SERPL CREATININE-BSD FRML MDRD: >60 ML/MIN/1.73M*2
GLUCOSE SERPL-MCNC: 82 MG/DL (ref 70–99)
HCT VFR BLDCO AUTO: 35.7 % (ref 40.1–51)
HGB BLD-MCNC: 10.7 G/DL (ref 13.7–17.5)
LABORATORY COMMENT REPORT: NORMAL
MCH RBC QN AUTO: 21.4 PG (ref 28–33.2)
MCHC RBC AUTO-ENTMCNC: 30 G/DL (ref 32.2–36.5)
MCV RBC AUTO: 71.5 FL (ref 83–98)
P AXIS: 37
PDW BLD AUTO: 10.4 FL (ref 9.4–12.4)
PLATELET # BLD AUTO: 157 K/UL (ref 150–350)
POTASSIUM SERPL-SCNC: 4.6 MEQ/L (ref 3.6–5.1)
PR INTERVAL: 154
QRS DURATION: 94
QT INTERVAL: 384
QTC CALCULATION(BAZETT): 411
R AXIS: -28
RBC # BLD AUTO: 4.99 M/UL (ref 4.5–5.8)
SODIUM SERPL-SCNC: 137 MEQ/L (ref 136–144)
T WAVE AXIS: 0
VENTRICULAR RATE: 69
WBC # BLD AUTO: 6.06 K/UL (ref 3.8–10.5)

## 2020-03-06 PROCEDURE — 36415 COLL VENOUS BLD VENIPUNCTURE: CPT

## 2020-03-06 PROCEDURE — 93010 ELECTROCARDIOGRAM REPORT: CPT | Performed by: INTERNAL MEDICINE

## 2020-03-06 PROCEDURE — 86850 RBC ANTIBODY SCREEN: CPT

## 2020-03-06 PROCEDURE — 85027 COMPLETE CBC AUTOMATED: CPT

## 2020-03-06 PROCEDURE — 80048 BASIC METABOLIC PNL TOTAL CA: CPT

## 2020-03-06 PROCEDURE — 93005 ELECTROCARDIOGRAM TRACING: CPT

## 2020-03-06 ASSESSMENT — PAIN SCALES - GENERAL: PAINLEVEL: 0-NO PAIN

## 2020-03-06 NOTE — PROGRESS NOTES
"Wound Ostomy Continence Note    Subjective    HPI Patient is a 59 y.o. male who was admitted on (Not on file) with a diagnosis of No admission diagnoses are documented for this encounter..    Problem list Problem List:   Patient Active Problem List   Diagnosis   • Crohn's disease of colon with complication (CMS/HCC)   • Basal cell carcinoma (BCC) in situ of skin      PMH/PSH Medical History:   Past Medical History:   Diagnosis Date   • Basal cell carcinoma (BCC) in situ of skin     right nare, left arm    • Crohn's disease of colon with complication (CMS/HCC) 2012    \"Crohn's colitis with megacolon\"     Surgical History:   Past Surgical History:   Procedure Laterality Date   • COLON SURGERY  05/27/2019    Colostomy/Ileostomy Laparoscopic   • OTHER SURGICAL HISTORY Right 12/2019    TAKEDOWN PARAMEDIAN FOREHEAD FLAP   • TEAR DUCT SURGERY  12/2019    repaired after basal cell skin cancer was removed from right side of nose- with flap   • TONSILLECTOMY  1962      Assessment and Recommendation Nursing level 1 wound healing stoma marking;   OR for ileotomy revision next week,   Assessment: current have ileostomy at R abd, it seem like pt has hernia around; he would like to have ileostomy placed at higher level than current one at his revision procedure;  marked 4 fingers space higher than current stoma;   WOCN will follow up at post op as needed   Norma Smith  RN, CWOCN               Date: 03/06/20  Signature: Norma Smith RN                "
no

## 2020-03-06 NOTE — PRE-PROCEDURE INSTRUCTIONS
1. We will call you between 3 pm and 7 pm on Tuesday  March 10, 2020 to determine that arrival time for your procedure. If you do not hear by 6PM. Please call [phone] for arrival time.    2. Please report to [facility] [place] [facility] [email or web address], walk into [facility] and report to the admission desk on first floor on the day of your procedure.   3. Please follow the following fasting guidelines:   Follow Dr Hernandez's instructions for the day before and the morning of surgery .     4. Early on the morning of the procedure please take your usual dose of the listed medications with a sip of water:  No medications the morning of surgery    5. Other Instructions: You may brush your teeth the morning of the procedure. Rinse and spit, do not swallow.  Bring a list of your medications with dosages with you.  Use surgical wash as directed.    6. If you develop a cold, cough, fever, rash, or other symptom prior to the data of the procedure, please report it to your physician immediately.   7. If you need to cancel the procedure for any reason, please contact your physician or call the unit listed above.   8. Make arrangements to have someone drive you home from the procedure. If you have not arranged for transportation home, your surgery may be cancelled.    9. You may not take public transportation unless accompanied by a responsible person.   10. You may not drive a car or operate complex or potentially dangerous machinery for 24 hours following anesthesia and/or sedation.   11. If it is medically necessary for you to have a longer stay, you will be informed as soon as the decision is made.   12. Do not wear or bring anything of value to the hospital including jewelry of any kind. Do not wear make-up or contact lenses.    13. No lotion, creams, powders, or oils on skin the morning of procedure    14. Dress in comfortable clothes.   15.  If instructed, please bring a copy of your Advanced Directive (Living Will/Durable Power of )  on the day of your procedure.      Pre operative instructions given as per protocol.  Form explained by: ELVIRA Samayoa     I have read and understand the above information. I have had sufficient opportunity to ask questions I might have and they have been answered to my satisfaction. I agree to comply with the Patient Responsibilities listed above and have received a copy of this form.

## 2020-03-11 ENCOUNTER — ANESTHESIA (INPATIENT)
Dept: OPERATING ROOM | Facility: HOSPITAL | Age: 60
DRG: 330 | End: 2020-03-11
Payer: COMMERCIAL

## 2020-03-11 ENCOUNTER — HOSPITAL ENCOUNTER (INPATIENT)
Facility: HOSPITAL | Age: 60
LOS: 5 days | Discharge: HOME HEALTH | DRG: 330 | End: 2020-03-16
Attending: COLON & RECTAL SURGERY | Admitting: COLON & RECTAL SURGERY
Payer: COMMERCIAL

## 2020-03-11 ENCOUNTER — ANESTHESIA EVENT (INPATIENT)
Dept: OPERATING ROOM | Facility: HOSPITAL | Age: 60
DRG: 330 | End: 2020-03-11
Payer: COMMERCIAL

## 2020-03-11 DIAGNOSIS — K50.119 CROHN'S DISEASE OF COLON WITH COMPLICATION (CMS/HCC): ICD-10-CM

## 2020-03-11 PROCEDURE — 37000001 HC ANESTHESIA GENERAL: Performed by: COLON & RECTAL SURGERY

## 2020-03-11 PROCEDURE — 36000006 HC OR LEVEL 6 INITIAL 30MIN: Performed by: COLON & RECTAL SURGERY

## 2020-03-11 PROCEDURE — 63600000 HC DRUGS/DETAIL CODE: Performed by: ANESTHESIOLOGY

## 2020-03-11 PROCEDURE — 71000001 HC PACU PHASE 1 INITIAL 30MIN: Performed by: COLON & RECTAL SURGERY

## 2020-03-11 PROCEDURE — 63700000 HC SELF-ADMINISTRABLE DRUG: Performed by: STUDENT IN AN ORGANIZED HEALTH CARE EDUCATION/TRAINING PROGRAM

## 2020-03-11 PROCEDURE — 45397 LAP REMOVE RECTUM W/POUCH: CPT | Mod: 22 | Performed by: COLON & RECTAL SURGERY

## 2020-03-11 PROCEDURE — 0DB84ZZ EXCISION OF SMALL INTESTINE, PERCUTANEOUS ENDOSCOPIC APPROACH: ICD-10-PCS | Performed by: COLON & RECTAL SURGERY

## 2020-03-11 PROCEDURE — 63600000 HC DRUGS/DETAIL CODE: Performed by: NURSE ANESTHETIST, CERTIFIED REGISTERED

## 2020-03-11 PROCEDURE — 71000011 HC PACU PHASE 1 EA ADDL MIN: Performed by: COLON & RECTAL SURGERY

## 2020-03-11 PROCEDURE — 88309 TISSUE EXAM BY PATHOLOGIST: CPT | Performed by: COLON & RECTAL SURGERY

## 2020-03-11 PROCEDURE — 25000000 HC PHARMACY GENERAL: Performed by: COLON & RECTAL SURGERY

## 2020-03-11 PROCEDURE — 49905 OMENTAL FLAP INTRA-ABDOM: CPT | Performed by: COLON & RECTAL SURGERY

## 2020-03-11 PROCEDURE — 63700000 HC SELF-ADMINISTRABLE DRUG: Performed by: COLON & RECTAL SURGERY

## 2020-03-11 PROCEDURE — 25800000 HC PHARMACY IV SOLUTIONS: Performed by: NURSE ANESTHETIST, CERTIFIED REGISTERED

## 2020-03-11 PROCEDURE — 8E0W4CZ ROBOTIC ASSISTED PROCEDURE OF TRUNK REGION, PERCUTANEOUS ENDOSCOPIC APPROACH: ICD-10-PCS | Performed by: COLON & RECTAL SURGERY

## 2020-03-11 PROCEDURE — 21400000 HC ROOM AND CARE CCU/INTERMEDIATE

## 2020-03-11 PROCEDURE — 25000000 HC PHARMACY GENERAL: Performed by: ANESTHESIOLOGY

## 2020-03-11 PROCEDURE — 63600000 HC DRUGS/DETAIL CODE: Performed by: STUDENT IN AN ORGANIZED HEALTH CARE EDUCATION/TRAINING PROGRAM

## 2020-03-11 PROCEDURE — 0WQF4ZZ REPAIR ABDOMINAL WALL, PERCUTANEOUS ENDOSCOPIC APPROACH: ICD-10-PCS | Performed by: COLON & RECTAL SURGERY

## 2020-03-11 PROCEDURE — 0DBL4ZZ EXCISION OF TRANSVERSE COLON, PERCUTANEOUS ENDOSCOPIC APPROACH: ICD-10-PCS | Performed by: COLON & RECTAL SURGERY

## 2020-03-11 PROCEDURE — 0D1B4Z4 BYPASS ILEUM TO CUTANEOUS, PERCUTANEOUS ENDOSCOPIC APPROACH: ICD-10-PCS | Performed by: COLON & RECTAL SURGERY

## 2020-03-11 PROCEDURE — 36000016 HC OR LEVEL 6 EA ADDL MIN: Performed by: COLON & RECTAL SURGERY

## 2020-03-11 PROCEDURE — 25000000 HC PHARMACY GENERAL: Performed by: NURSE ANESTHETIST, CERTIFIED REGISTERED

## 2020-03-11 PROCEDURE — 25000000 HC PHARMACY GENERAL: Performed by: STUDENT IN AN ORGANIZED HEALTH CARE EDUCATION/TRAINING PROGRAM

## 2020-03-11 PROCEDURE — 27200000 HC STERILE SUPPLY: Performed by: COLON & RECTAL SURGERY

## 2020-03-11 PROCEDURE — 63600000 HC DRUGS/DETAIL CODE: Performed by: COLON & RECTAL SURGERY

## 2020-03-11 RX ORDER — GABAPENTIN 300 MG/1
600 CAPSULE ORAL 2 TIMES DAILY
Status: DISCONTINUED | OUTPATIENT
Start: 2020-03-11 | End: 2020-03-16 | Stop reason: HOSPADM

## 2020-03-11 RX ORDER — IBUPROFEN 200 MG
16-32 TABLET ORAL AS NEEDED
Status: DISCONTINUED | OUTPATIENT
Start: 2020-03-11 | End: 2020-03-11 | Stop reason: HOSPADM

## 2020-03-11 RX ORDER — DEXAMETHASONE SODIUM PHOSPHATE 4 MG/ML
INJECTION, SOLUTION INTRA-ARTICULAR; INTRALESIONAL; INTRAMUSCULAR; INTRAVENOUS; SOFT TISSUE AS NEEDED
Status: DISCONTINUED | OUTPATIENT
Start: 2020-03-11 | End: 2020-03-11 | Stop reason: SURG

## 2020-03-11 RX ORDER — HYDROMORPHONE HYDROCHLORIDE 2 MG/ML
INJECTION, SOLUTION INTRAMUSCULAR; INTRAVENOUS; SUBCUTANEOUS AS NEEDED
Status: DISCONTINUED | OUTPATIENT
Start: 2020-03-11 | End: 2020-03-11 | Stop reason: SURG

## 2020-03-11 RX ORDER — SODIUM CHLORIDE 9 MG/ML
INJECTION, SOLUTION INTRAVENOUS CONTINUOUS PRN
Status: DISCONTINUED | OUTPATIENT
Start: 2020-03-11 | End: 2020-03-11 | Stop reason: SURG

## 2020-03-11 RX ORDER — PROPOFOL 10 MG/ML
INJECTION, EMULSION INTRAVENOUS AS NEEDED
Status: DISCONTINUED | OUTPATIENT
Start: 2020-03-11 | End: 2020-03-11 | Stop reason: SURG

## 2020-03-11 RX ORDER — MIDAZOLAM HYDROCHLORIDE 2 MG/2ML
INJECTION, SOLUTION INTRAMUSCULAR; INTRAVENOUS AS NEEDED
Status: DISCONTINUED | OUTPATIENT
Start: 2020-03-11 | End: 2020-03-11 | Stop reason: SURG

## 2020-03-11 RX ORDER — ACETAMINOPHEN 325 MG/1
975 TABLET ORAL ONCE
Status: COMPLETED | OUTPATIENT
Start: 2020-03-11 | End: 2020-03-11

## 2020-03-11 RX ORDER — INDOCYANINE GREEN AND WATER 25 MG
KIT INJECTION AS NEEDED
Status: DISCONTINUED | OUTPATIENT
Start: 2020-03-11 | End: 2020-03-11 | Stop reason: SURG

## 2020-03-11 RX ORDER — IBUPROFEN 200 MG
16-32 TABLET ORAL AS NEEDED
Status: DISCONTINUED | OUTPATIENT
Start: 2020-03-11 | End: 2020-03-11

## 2020-03-11 RX ORDER — PANTOPRAZOLE SODIUM 40 MG/10ML
40 INJECTION, POWDER, LYOPHILIZED, FOR SOLUTION INTRAVENOUS EVERY 24 HOURS
Status: DISCONTINUED | OUTPATIENT
Start: 2020-03-11 | End: 2020-03-12

## 2020-03-11 RX ORDER — ALVIMOPAN 12 MG/1
12 CAPSULE ORAL 2 TIMES DAILY
Status: DISCONTINUED | OUTPATIENT
Start: 2020-03-12 | End: 2020-03-16 | Stop reason: HOSPADM

## 2020-03-11 RX ORDER — DEXTROSE 50 % IN WATER (D50W) INTRAVENOUS SYRINGE
25 AS NEEDED
Status: DISCONTINUED | OUTPATIENT
Start: 2020-03-11 | End: 2020-03-11 | Stop reason: HOSPADM

## 2020-03-11 RX ORDER — ONDANSETRON HYDROCHLORIDE 2 MG/ML
INJECTION, SOLUTION INTRAVENOUS AS NEEDED
Status: DISCONTINUED | OUTPATIENT
Start: 2020-03-11 | End: 2020-03-11 | Stop reason: SURG

## 2020-03-11 RX ORDER — FENTANYL CITRATE 50 UG/ML
50 INJECTION, SOLUTION INTRAMUSCULAR; INTRAVENOUS
Status: DISCONTINUED | OUTPATIENT
Start: 2020-03-11 | End: 2020-03-11 | Stop reason: HOSPADM

## 2020-03-11 RX ORDER — SODIUM CHLORIDE, SODIUM GLUCONATE, SODIUM ACETATE, POTASSIUM CHLORIDE AND MAGNESIUM CHLORIDE 30; 37; 368; 526; 502 MG/100ML; MG/100ML; MG/100ML; MG/100ML; MG/100ML
INJECTION, SOLUTION INTRAVENOUS CONTINUOUS PRN
Status: DISCONTINUED | OUTPATIENT
Start: 2020-03-11 | End: 2020-03-11 | Stop reason: SURG

## 2020-03-11 RX ORDER — BUPIVACAINE HYDROCHLORIDE 5 MG/ML
INJECTION, SOLUTION EPIDURAL; INTRACAUDAL AS NEEDED
Status: DISCONTINUED | OUTPATIENT
Start: 2020-03-11 | End: 2020-03-11 | Stop reason: HOSPADM

## 2020-03-11 RX ORDER — ROCURONIUM BROMIDE 10 MG/ML
INJECTION, SOLUTION INTRAVENOUS AS NEEDED
Status: DISCONTINUED | OUTPATIENT
Start: 2020-03-11 | End: 2020-03-11 | Stop reason: SURG

## 2020-03-11 RX ORDER — EPHEDRINE SULFATE 50 MG/ML
INJECTION, SOLUTION INTRAVENOUS AS NEEDED
Status: DISCONTINUED | OUTPATIENT
Start: 2020-03-11 | End: 2020-03-11 | Stop reason: SURG

## 2020-03-11 RX ORDER — KETOROLAC TROMETHAMINE 30 MG/ML
30 INJECTION, SOLUTION INTRAMUSCULAR; INTRAVENOUS
Status: COMPLETED | OUTPATIENT
Start: 2020-03-11 | End: 2020-03-13

## 2020-03-11 RX ORDER — CEFAZOLIN SODIUM 2 G/50ML
2 SOLUTION INTRAVENOUS
Status: COMPLETED | OUTPATIENT
Start: 2020-03-11 | End: 2020-03-11

## 2020-03-11 RX ORDER — KETAMINE HYDROCHLORIDE 10 MG/ML
INJECTION, SOLUTION INTRAMUSCULAR; INTRAVENOUS AS NEEDED
Status: DISCONTINUED | OUTPATIENT
Start: 2020-03-11 | End: 2020-03-11 | Stop reason: SURG

## 2020-03-11 RX ORDER — GABAPENTIN 300 MG/1
600 CAPSULE ORAL ONCE
Status: COMPLETED | OUTPATIENT
Start: 2020-03-11 | End: 2020-03-11

## 2020-03-11 RX ORDER — KETAMINE HYDROCHLORIDE 50 MG/ML
INJECTION, SOLUTION INTRAMUSCULAR; INTRAVENOUS CONTINUOUS PRN
Status: DISCONTINUED | OUTPATIENT
Start: 2020-03-11 | End: 2020-03-11 | Stop reason: SURG

## 2020-03-11 RX ORDER — ENOXAPARIN SODIUM 100 MG/ML
40 INJECTION SUBCUTANEOUS
Status: DISCONTINUED | OUTPATIENT
Start: 2020-03-11 | End: 2020-03-16 | Stop reason: HOSPADM

## 2020-03-11 RX ORDER — FENTANYL CITRATE 50 UG/ML
INJECTION, SOLUTION INTRAMUSCULAR; INTRAVENOUS AS NEEDED
Status: DISCONTINUED | OUTPATIENT
Start: 2020-03-11 | End: 2020-03-11 | Stop reason: SURG

## 2020-03-11 RX ORDER — GLYCOPYRROLATE 0.6MG/3ML
SYRINGE (ML) INTRAVENOUS AS NEEDED
Status: DISCONTINUED | OUTPATIENT
Start: 2020-03-11 | End: 2020-03-11 | Stop reason: SURG

## 2020-03-11 RX ORDER — TRAMADOL HYDROCHLORIDE 50 MG/1
50 TABLET ORAL EVERY 4 HOURS PRN
Status: DISCONTINUED | OUTPATIENT
Start: 2020-03-11 | End: 2020-03-16 | Stop reason: HOSPADM

## 2020-03-11 RX ORDER — CELECOXIB 200 MG/1
200 CAPSULE ORAL ONCE
Status: COMPLETED | OUTPATIENT
Start: 2020-03-11 | End: 2020-03-11

## 2020-03-11 RX ORDER — DEXTROSE 40 %
15-30 GEL (GRAM) ORAL AS NEEDED
Status: DISCONTINUED | OUTPATIENT
Start: 2020-03-11 | End: 2020-03-11

## 2020-03-11 RX ORDER — DEXTROSE 50 % IN WATER (D50W) INTRAVENOUS SYRINGE
25 AS NEEDED
Status: DISCONTINUED | OUTPATIENT
Start: 2020-03-11 | End: 2020-03-11

## 2020-03-11 RX ORDER — NEOSTIGMINE METHYLSULFATE 1 MG/ML
INJECTION INTRAVENOUS AS NEEDED
Status: DISCONTINUED | OUTPATIENT
Start: 2020-03-11 | End: 2020-03-11 | Stop reason: SURG

## 2020-03-11 RX ORDER — LIDOCAINE HCL/PF 100 MG/5ML
SYRINGE (ML) INTRAVENOUS AS NEEDED
Status: DISCONTINUED | OUTPATIENT
Start: 2020-03-11 | End: 2020-03-11 | Stop reason: SURG

## 2020-03-11 RX ORDER — METRONIDAZOLE 500 MG/100ML
500 INJECTION, SOLUTION INTRAVENOUS
Status: COMPLETED | OUTPATIENT
Start: 2020-03-11 | End: 2020-03-11

## 2020-03-11 RX ORDER — SODIUM CHLORIDE, SODIUM LACTATE, POTASSIUM CHLORIDE, CALCIUM CHLORIDE 600; 310; 30; 20 MG/100ML; MG/100ML; MG/100ML; MG/100ML
INJECTION, SOLUTION INTRAVENOUS CONTINUOUS
Status: DISCONTINUED | OUTPATIENT
Start: 2020-03-11 | End: 2020-03-12

## 2020-03-11 RX ORDER — LIDOCAINE HYDROCHLORIDE ANHYDROUS AND DEXTROSE MONOHYDRATE .8; 5 G/100ML; G/100ML
1 INJECTION, SOLUTION INTRAVENOUS
Status: DISCONTINUED | OUTPATIENT
Start: 2020-03-11 | End: 2020-03-11

## 2020-03-11 RX ORDER — DEXTROSE 40 %
15-30 GEL (GRAM) ORAL AS NEEDED
Status: DISCONTINUED | OUTPATIENT
Start: 2020-03-11 | End: 2020-03-11 | Stop reason: HOSPADM

## 2020-03-11 RX ORDER — PHENYLEPHRINE HYDROCHLORIDE 10 MG/ML
INJECTION INTRAVENOUS AS NEEDED
Status: DISCONTINUED | OUTPATIENT
Start: 2020-03-11 | End: 2020-03-11 | Stop reason: SURG

## 2020-03-11 RX ORDER — HYDROMORPHONE HYDROCHLORIDE 2 MG/ML
0.5 INJECTION, SOLUTION INTRAMUSCULAR; INTRAVENOUS; SUBCUTANEOUS
Status: DISCONTINUED | OUTPATIENT
Start: 2020-03-11 | End: 2020-03-11 | Stop reason: HOSPADM

## 2020-03-11 RX ORDER — ACETAMINOPHEN 325 MG/1
975 TABLET ORAL
Status: DISCONTINUED | OUTPATIENT
Start: 2020-03-11 | End: 2020-03-16 | Stop reason: HOSPADM

## 2020-03-11 RX ORDER — ALVIMOPAN 12 MG/1
12 CAPSULE ORAL ONCE
Status: COMPLETED | OUTPATIENT
Start: 2020-03-11 | End: 2020-03-11

## 2020-03-11 RX ADMIN — PROPOFOL 150 MG: 10 INJECTION, EMULSION INTRAVENOUS at 07:47

## 2020-03-11 RX ADMIN — INDOCYANINE GREEN 2 ML: KIT INTRAVENOUS at 15:21

## 2020-03-11 RX ADMIN — SODIUM CHLORIDE: 9 INJECTION, SOLUTION INTRAVENOUS at 07:35

## 2020-03-11 RX ADMIN — EPHEDRINE SULFATE 10 MG: 50 INJECTION, SOLUTION INTRAVENOUS at 08:04

## 2020-03-11 RX ADMIN — ONDANSETRON HYDROCHLORIDE 4 MG: 2 SOLUTION INTRAMUSCULAR; INTRAVENOUS at 16:15

## 2020-03-11 RX ADMIN — SODIUM CHLORIDE, SODIUM GLUCONATE, SODIUM ACETATE, POTASSIUM CHLORIDE AND MAGNESIUM CHLORIDE: 526; 502; 368; 37; 30 INJECTION, SOLUTION INTRAVENOUS at 07:31

## 2020-03-11 RX ADMIN — FENTANYL CITRATE 50 MCG: 50 INJECTION, SOLUTION INTRAMUSCULAR; INTRAVENOUS at 07:47

## 2020-03-11 RX ADMIN — GABAPENTIN 600 MG: 300 CAPSULE ORAL at 20:36

## 2020-03-11 RX ADMIN — Medication 50 MG: at 07:47

## 2020-03-11 RX ADMIN — INDOCYANINE GREEN 2 ML: KIT INTRAVENOUS at 14:52

## 2020-03-11 RX ADMIN — PHENYLEPHRINE HYDROCHLORIDE 50 MCG: 10 INJECTION INTRAVENOUS at 08:04

## 2020-03-11 RX ADMIN — NEOSTIGMINE METHYLSULFATE 2.5 MG: 1 INJECTION INTRAVENOUS at 16:58

## 2020-03-11 RX ADMIN — FENTANYL CITRATE 50 MCG: 50 INJECTION INTRAMUSCULAR; INTRAVENOUS at 17:53

## 2020-03-11 RX ADMIN — CEFAZOLIN 1 G: 10 INJECTION, POWDER, FOR SOLUTION INTRAVENOUS at 15:57

## 2020-03-11 RX ADMIN — PHENYLEPHRINE HYDROCHLORIDE 100 MCG: 10 INJECTION INTRAVENOUS at 15:36

## 2020-03-11 RX ADMIN — HYDROMORPHONE HYDROCHLORIDE 0.4 MG: 2 INJECTION, SOLUTION INTRAMUSCULAR; INTRAVENOUS; SUBCUTANEOUS at 16:10

## 2020-03-11 RX ADMIN — LIDOCAINE HYDROCHLORIDE 3 ML: 20 INJECTION, SOLUTION INTRAVENOUS at 07:47

## 2020-03-11 RX ADMIN — ROCURONIUM BROMIDE 10 MG: 10 INJECTION, SOLUTION INTRAVENOUS at 13:22

## 2020-03-11 RX ADMIN — ROCURONIUM BROMIDE 30 MG: 10 INJECTION, SOLUTION INTRAVENOUS at 09:14

## 2020-03-11 RX ADMIN — GLYCOPYRROLATE 0.3 MG: 0.2 INJECTION INTRAMUSCULAR; INTRAVENOUS at 16:58

## 2020-03-11 RX ADMIN — ROCURONIUM BROMIDE 20 MG: 10 INJECTION, SOLUTION INTRAVENOUS at 11:50

## 2020-03-11 RX ADMIN — METRONIDAZOLE 500 MG: 500 INJECTION, SOLUTION INTRAVENOUS at 15:57

## 2020-03-11 RX ADMIN — CEFAZOLIN 1 G: 10 INJECTION, POWDER, FOR SOLUTION INTRAVENOUS at 11:50

## 2020-03-11 RX ADMIN — PHENYLEPHRINE HYDROCHLORIDE 50 MCG: 10 INJECTION INTRAVENOUS at 09:24

## 2020-03-11 RX ADMIN — KETAMINE HYDROCHLORIDE 3 MCG/KG/MIN: 50 INJECTION INTRAMUSCULAR; INTRAVENOUS at 07:35

## 2020-03-11 RX ADMIN — EPHEDRINE SULFATE 5 MG: 50 INJECTION, SOLUTION INTRAVENOUS at 10:47

## 2020-03-11 RX ADMIN — CEFAZOLIN 2 G: 10 INJECTION, POWDER, FOR SOLUTION INTRAVENOUS at 07:55

## 2020-03-11 RX ADMIN — ROCURONIUM BROMIDE 20 MG: 10 INJECTION, SOLUTION INTRAVENOUS at 10:48

## 2020-03-11 RX ADMIN — PHENYLEPHRINE HYDROCHLORIDE 100 MCG: 10 INJECTION INTRAVENOUS at 07:53

## 2020-03-11 RX ADMIN — ROCURONIUM BROMIDE 20 MG: 10 INJECTION, SOLUTION INTRAVENOUS at 08:22

## 2020-03-11 RX ADMIN — KETOROLAC TROMETHAMINE 30 MG: 30 INJECTION, SOLUTION INTRAMUSCULAR at 17:54

## 2020-03-11 RX ADMIN — DEXAMETHASONE SODIUM PHOSPHATE 8 MG: 4 INJECTION, SOLUTION INTRAMUSCULAR; INTRAVENOUS at 07:47

## 2020-03-11 RX ADMIN — PHENYLEPHRINE HYDROCHLORIDE 100 MCG: 10 INJECTION INTRAVENOUS at 15:32

## 2020-03-11 RX ADMIN — ACETAMINOPHEN 975 MG: 325 TABLET, FILM COATED ORAL at 06:21

## 2020-03-11 RX ADMIN — PHENYLEPHRINE HYDROCHLORIDE 100 MCG: 10 INJECTION INTRAVENOUS at 15:00

## 2020-03-11 RX ADMIN — PHENYLEPHRINE HYDROCHLORIDE 100 MCG: 10 INJECTION INTRAVENOUS at 08:16

## 2020-03-11 RX ADMIN — PANTOPRAZOLE SODIUM 40 MG: 40 INJECTION, POWDER, LYOPHILIZED, FOR SOLUTION INTRAVENOUS at 17:58

## 2020-03-11 RX ADMIN — GABAPENTIN 600 MG: 300 CAPSULE ORAL at 06:23

## 2020-03-11 RX ADMIN — SODIUM CHLORIDE, POTASSIUM CHLORIDE, SODIUM LACTATE AND CALCIUM CHLORIDE: 600; 310; 30; 20 INJECTION, SOLUTION INTRAVENOUS at 17:38

## 2020-03-11 RX ADMIN — PHENYLEPHRINE HYDROCHLORIDE 50 MCG: 10 INJECTION INTRAVENOUS at 10:21

## 2020-03-11 RX ADMIN — KETOROLAC TROMETHAMINE 30 MG: 30 INJECTION, SOLUTION INTRAMUSCULAR at 23:25

## 2020-03-11 RX ADMIN — PHENYLEPHRINE HYDROCHLORIDE 50 MCG: 10 INJECTION INTRAVENOUS at 10:37

## 2020-03-11 RX ADMIN — ALVIMOPAN 12 MG: 12 CAPSULE ORAL at 06:22

## 2020-03-11 RX ADMIN — EPHEDRINE SULFATE 10 MG: 50 INJECTION, SOLUTION INTRAVENOUS at 09:05

## 2020-03-11 RX ADMIN — ROCURONIUM BROMIDE 20 MG: 10 INJECTION, SOLUTION INTRAVENOUS at 15:54

## 2020-03-11 RX ADMIN — LIDOCAINE HYDROCHLORIDE 1 MG/MIN: 8 INJECTION, SOLUTION INTRAVENOUS at 07:35

## 2020-03-11 RX ADMIN — PHENYLEPHRINE HYDROCHLORIDE 100 MCG: 10 INJECTION INTRAVENOUS at 09:05

## 2020-03-11 RX ADMIN — PHENYLEPHRINE HYDROCHLORIDE 50 MCG: 10 INJECTION INTRAVENOUS at 09:45

## 2020-03-11 RX ADMIN — CELECOXIB 200 MG: 200 CAPSULE ORAL at 06:22

## 2020-03-11 RX ADMIN — ROCURONIUM BROMIDE 50 MG: 10 INJECTION, SOLUTION INTRAVENOUS at 07:47

## 2020-03-11 RX ADMIN — PHENYLEPHRINE HYDROCHLORIDE 100 MCG: 10 INJECTION INTRAVENOUS at 15:43

## 2020-03-11 RX ADMIN — NEOSTIGMINE METHYLSULFATE 2.5 MG: 1 INJECTION INTRAVENOUS at 16:56

## 2020-03-11 RX ADMIN — PHENYLEPHRINE HYDROCHLORIDE 100 MCG: 10 INJECTION INTRAVENOUS at 08:00

## 2020-03-11 RX ADMIN — METRONIDAZOLE 500 MG: 500 INJECTION, SOLUTION INTRAVENOUS at 07:50

## 2020-03-11 RX ADMIN — MIDAZOLAM HYDROCHLORIDE 2 MG: 1 INJECTION, SOLUTION INTRAMUSCULAR; INTRAVENOUS at 07:32

## 2020-03-11 RX ADMIN — GLYCOPYRROLATE 0.3 MG: 0.2 INJECTION INTRAMUSCULAR; INTRAVENOUS at 16:56

## 2020-03-11 RX ADMIN — FENTANYL CITRATE 50 MCG: 50 INJECTION, SOLUTION INTRAMUSCULAR; INTRAVENOUS at 11:54

## 2020-03-11 RX ADMIN — ACETAMINOPHEN 975 MG: 325 TABLET, FILM COATED ORAL at 18:55

## 2020-03-11 ASSESSMENT — COGNITIVE AND FUNCTIONAL STATUS - GENERAL
STANDING UP FROM CHAIR USING ARMS: 4 - NONE
CLIMB 3 TO 5 STEPS WITH RAILING: 4 - NONE
WALKING IN HOSPITAL ROOM: 4 - NONE
HELP NEEDED FOR BATHING: 4 - NONE
TOILETING: 4 - NONE
HELP NEEDED FOR PERSONAL GROOMING: 4 - NONE
EATING MEALS: 4 - NONE
DRESSING REGULAR LOWER BODY CLOTHING: 4 - NONE
MOVING TO AND FROM BED TO CHAIR: 4 - NONE
DRESSING REGULAR UPPER BODY CLOTHING: 4 - NONE

## 2020-03-11 NOTE — OP NOTE
PROCTOCOLECTOMY LAPAROSCOPIC ROBOTIC, END ILEOSTOMY Procedure Note    Hospital: [facility]  Medical Record Number:  [id number]  Patient Name:  Layton Ritchie Jr.  YOB: 1960   Date of Operation:  3/11/2020    Procedures:    * PROCTOCOLECTOMY LAPAROSCOPIC ROBOTIC    Closure Colostomy, Ileostomy relocation.    Omental pedicle flap    Pre-op Diagnosis     * Crohn's disease of colon with complication (CMS/HCC) [K50.119]       Post-op Diagnosis     * Crohn's disease of colon with complication (CMS/HCC) [K50.119]    Surgeon(s) and Role:     * James Hernandez MD - Primary     * Angel Melendez DO - Resident - Assisting    Anesthesia: General    Staff:   Circulator: Ned Cuellar RN; Leeroy Contreras, RN  Scrub Person: Angel Andrade; Leeroy Contreras, GLENYS; Monty Sutton     Clinical History: This 59-year-old gentleman had severe Crohn's colitis and developed megacolon requiring diverting loop ileostomy and blowhole colostomy.  The hope was that he would get on appropriate biologic therapy and then get healing of his colon to prevent permanent ileostomy.  He continues to have endoscopic evidence of severe left-sided colitis with anal ulceration and stenosis.  He is also quite bothered by severe prolapse of his transverse loop colostomy.  He also is having problems with the placement of his ileostomy in relationship to where he wears his close.  He now presents for total proctocolectomy, repair of the transverse colon prolapse, and relocation of his ileostomy.    Findings: Severe thickening and chronic inflammation in the left colon with ulceration in the anal canal and severe colitis of the distal rectum visible.    Procedure Details   The patient was placed in a lithotomy position the operating table after the induction of general anesthesia.  The rectum was irrigated with warm saline solution and then Betadine.  The perineum was prepped with Betadine.  The abdomen was prepped with  ChloraPrep except the ileostomy which was prepped with Betadine and the abdomen and perineum was draped sterilely.    A circumferential incision was made at the transverse loop colostomy and dissection was taken down into the peritoneal cavity  the colon from the muscle and fascia.  The colon was delivered into the wound.  The opening of the colostomy was then temporarily closed with a running suture of 2-0 Vicryl and the colon was reduced in the abdomen.  A wound protector was placed at the site which was then used for insufflation via a robotic port.  The additional 8 mm ports were then placed in a horizontal direction extending towards the right ASIS, and air seal port was placed in the right upper quadrant.  A 5 mm port was placed in the left mid abdomen.  Later, an additional assistant port was placed in the mid abdomen.    Laparoscopically, the splenic flexure was mobilized by incising the retroperitoneum at the level of the inferior mesenteric vein and doing a medial to lateral dissection detaching the mesentery from the inferior border the pancreas and entering the lesser sac.  The lateral attachments of the descending colon and the transverse colon were all mobilized.  Inferior mesenteric vein was divided.  The omentum was fully divided off of the descending and transverse colon all the way to the hepatic flexure.  An omental pedicle flap was performed by dividing the omentum and the proximal transverse colon and detaching the omentum from the greater curvature of the stomach.  This was later used to incorporate the anal closure.    The rest of the transverse colon was mobilized and the colic vessels were divided with the harmonic scalpel.  The lateral attachments of the right colon were fully mobilized.    At this point the ileostomy itself was taken down in the same fashion as the colostomy.  The skin was incised circumferentially and the bowel was dissected free from the subcutaneous tissue,  muscle, and fascia.  The ileum was delivered into the wound.  The ileum was un-everted and then the ileostomy itself was resected with 2 firings of the CATHI 55 stapler.  The mesentery to the terminal ileum was then controlled in order to mobilize for an end ileostomy.  Using harmonic scalpel, the remaining mesentery to the right colon was divided and the ileal mesentery  from the ileum completely.    The patient was placed in a steep Trendelenburg position and the robot was docked.  The peritoneum was incised along the left colon and the presacral space was entered.  The inferior mesenteric artery was isolated and the hypogastric nerves were preserved.  The AGUSTO was then divided with harmonic scalpel.  A medial to lateral dissection was then performed and dissection was taken down into the pelvis.  The lateral attachments of the sigmoid and descending colon were divided.  The colon was then mobilized circumferentially keeping close into the rectal wall leaving behind a portion of the mesorectum.  Dissection was taken in close to the rectum laterally to avoid the nerves along the pelvic sidewall.  Anteriorly, dissection was again performed close to the rectal wall posterior to Denonvilliers' fascia.  Section was taken down to the anal canal circumferentially.    The anus was then everted with heavy sutures and the dentate line was identified.  There was significant ulceration in the anal canal, particularly on the right side extending down distal to the dentate line.  An intersphincteric dissection was then performed circumferentially and the pelvic dissection was entered.  The anus was detached completely and then the specimen was oversewn with a heavy Vicryl suture.  Hemostasis was confirmed in the pelvis.  The omental flap was then brought down into the pelvis down to the anal opening.  The levator musculature was closed with interrupted 2-0 Vicryl suture, incorporating the distal portion of the pedicled  flap.  The more distal external sphincter was then approximated with interrupted 2-0 Vicryl and then the dentate line was approximated with interrupted 3-0 Vicryl.    The colon had been completely detached and was removed through the left upper quadrant colostomy site.  The small bowel was oriented properly and the ileum was grasped.  A trephine was then created in the right rectus muscle with the previously chosen location and the ileum brought out for an end ileostomy.  The cut edge of mesentery was sutured to the abdominal wall with running 0 chromic extending up to the falciform ligament.  The abdomen was deflated.  Both stoma sites had some amount of parastomal hernia and the hernia sacs were resected.  The fascia was freed up on both sides.  Fascia at both sites were then closed vertically with interrupted figure-of-eight sutures of 1 PDS.  Interrupted subcuticular sutures of 3-0 Vicryl were used at the ileostomy site to reapproximate the superior portion of the wound where the stoma appliance would be covering.  The inferior aspect was left open for drainage and the wound was packed.  The colostomy site was narrowed with a pursestring suture of Vicryl and then the wound packed.  All of the port sites were closed with interrupted 4-0 Vicryl subcuticular sutures and Dermabond.  Dermabond was also used to seal the upper portion of the ileostomy site.  The staple line of the ileum was then divided and the ileum matured to the dermis circumferentially in a standard everting fashion with 3-0 chromic.  A single seromuscular suture was placed inferiorly in order to secure the ileum.  An appliance was placed.    The patient was extubated and taken to the recovery room in stable condition.  There were no intraoperative complications.  The sponges were counts are correct.  I was present and scrubbed for entire procedure.    Estimated Blood Loss: 150 mL    Specimens:                Order Name Source Comment Collection  Info Order Time   PATHOLOGY TISSUE EXAM Small Intestine, Ileum Pre-op diagnosis:  Crohn's disease of colon with complication (CMS/HCC) [K50.119] Collected By: James Hernandez MD 3/11/2020 11:29 AM         Drains:   Drain 1 LUQ 19 Fr. (Active)   Drainage Characteristics/Odor bloody 3/11/2020  5:27 PM   Status Compressed 3/11/2020  5:27 PM       Ileostomy Standard (angella Cortes) RUQ (Active)   Stomal Appliance Intact 3/11/2020  5:25 PM       Urethral Catheter Latex 16 Fr (Active)   Urine Characteristics tea colored 3/11/2020  5:25 PM   Securement Method Securing Device 3/11/2020  5:25 PM       Implants: * No implants in log *           Complications:  None; patient tolerated the procedure well.           Disposition: PACU - hemodynamically stable.           Condition: stable    James Hernandez MD  Phone Number: [phone]

## 2020-03-11 NOTE — ANESTHESIA POSTPROCEDURE EVALUATION
Patient: Layton Ritchie Jr.    Procedure Summary     Date:  03/11/20 Room / Location:  [facility] / [facility]    Anesthesia Start:  0731 Anesthesia Stop:  1721    Procedure:  PROCTOCOLECTOMY LAPAROSCOPIC ROBOTIC, END ILEOSTOMY (N/A ) Diagnosis:       Crohn's disease of colon with complication (CMS/HCC)      (Crohn's disease of colon with complication (CMS/HCC) [K50.119])    Surgeon:  James Hernandez MD Responsible Provider:  Porsha Pacheco MD    Anesthesia Type:  general ASA Status:  2          Anesthesia Type: general  PACU Vitals  3/11/2020 1709 - 3/11/2020 1801      3/11/2020  1717 3/11/2020  1730 3/11/2020  1745 3/11/2020  1800    BP:  105/66  115/66  125/66  --    Temp:  36.6 °C (97.9 °F)  --  --  --    Pulse:  69  72  72  68    Resp:  15  13  20  (!) 26    SpO2:  98 %  99 %  99 %  (!) 91 %            Anesthesia Post Evaluation    Pain management: satisfactory to patient  Mode of pain management: IV medication  Patient location during evaluation: PACU  Patient participation: complete - patient participated  Level of consciousness: awake and alert  Cardiovascular status: acceptable and hemodynamically stable  Airway Patency: adequate  Respiratory status: acceptable  Hydration status: stable  Anesthetic complications: no

## 2020-03-11 NOTE — ANESTHESIOLOGIST PRE-PROCEDURE ATTESTATION
Pre-Procedure Patient Identification:  I am the Primary Anesthesiologist and have identified the patient on 03/11/20 at 7:27 AM.   I have confirmed the following procedure(s) PROCTO/RECTOCOLECTOMY LAPAROSCOPIC JERRICA ROBOTIC will be performed by the following surgeon/proceduralist Mary, James NUNN MD.

## 2020-03-11 NOTE — ANESTHESIA PROCEDURE NOTES
Airway  Urgency: elective    Start Time: 3/11/2020 7:50 AM  Difficult airway    General Information and Staff    Patient location during procedure: OR  Anesthesiologist: Porsha Pacheco MD  Performed: anesthesiologist     Indications and Patient Condition  Indications for airway management: anesthesia  Sedation level: deep  Preoxygenated: yes  Patient position: sniffing  MILS maintained throughout  Mask difficulty assessment: 3 - difficult mask (inadequate, unstable or two providers) +/- NMBA    Final Airway Details  Final airway type: endotracheal airway      Successful airway: ETT    Successful intubation technique: video laryngoscopy  Facilitating devices/methods: intubating stylet  Endotracheal tube insertion site: oral  Blade: Agnes  Blade size: #4  ETT size (mm): 7.5  Cormack-Lehane Classification: grade I - full view of glottis  Placement verified by: chest auscultation and capnometry   Measured from: lips  Number of attempts at approach: 1  Number of other approaches attempted: 0  Atraumatic airway insertion

## 2020-03-11 NOTE — OR SURGEON
Pre-Procedure patient identification:  I am the primary operating surgeon/proceduralist and I have identified the patient on 03/11/20 at 7:19 AM James Hernandez MD  Phone Number: [phone]

## 2020-03-11 NOTE — ANESTHESIA PREPROCEDURE EVALUATION
"Relevant Problems   GASTROINTESTINAL   (+) Crohn's disease of colon with complication (CMS/HCC)     Anesthesia ROS/MED HX    Anesthesia History    Previous anesthetics  No history of anesthetic complications  Hematological    anemia  GI/Hepatic   inflammatory bowel disease (Crohn's Disease)       Patient Active Problem List   Diagnosis   • Crohn's disease of colon with complication (CMS/HCC)   • Basal cell carcinoma (BCC) in situ of skin      Past Medical History:   Diagnosis Date   • Basal cell carcinoma (BCC) in situ of skin     right nare, left arm    • Crohn's disease of colon with complication (CMS/HCC) 2012    \"Crohn's colitis with megacolon\"     Past Surgical History:   Procedure Laterality Date   • COLON SURGERY  05/27/2019    Colostomy/Ileostomy Laparoscopic   • OTHER SURGICAL HISTORY Right 12/2019    TAKEDOWN PARAMEDIAN FOREHEAD FLAP   • TEAR DUCT SURGERY  12/2019    repaired after basal cell skin cancer was removed from right side of nose- with flap   • TONSILLECTOMY  1962     Social History     Socioeconomic History   • Marital status: Single     Spouse name: Not on file   • Number of children: Not on file   • Years of education: Not on file   • Highest education level: Not on file   Occupational History   • Not on file   Social Needs   • Financial resource strain: Not on file   • Food insecurity:     Worry: Not on file     Inability: Not on file   • Transportation needs:     Medical: Not on file     Non-medical: Not on file   Tobacco Use   • Smoking status: Never Smoker   • Smokeless tobacco: Never Used   Substance and Sexual Activity   • Alcohol use: No   • Drug use: No   • Sexual activity: Not on file   Lifestyle   • Physical activity:     Days per week: Not on file     Minutes per session: Not on file   • Stress: Not on file   Relationships   • Social connections:     Talks on phone: Not on file     Gets together: Not on file     Attends Alevism service: Not on file     Active member of club or " "organization: Not on file     Attends meetings of clubs or organizations: Not on file     Relationship status: Not on file   • Intimate partner violence:     Fear of current or ex partner: Not on file     Emotionally abused: Not on file     Physically abused: Not on file     Forced sexual activity: Not on file   Other Topics Concern   • Not on file   Social History Narrative   • Not on file     No Known Allergies    Current Facility-Administered Medications   Medication Dose Route Frequency   • ceFAZolin  2 g intravenous 60 min Pre-Op    And   • metroNIDAZOLE  500 mg intravenous 60 min Pre-Op     Prior to Admission medications    Medication Sig Start Date End Date Taking? Authorizing Provider   ustekinumab (STELARA SUBQ) Inject under the skin every 2 (two) months. Last 1/23/20    Provider, MD Lefty     Vitals:    03/11/20 0618   BP: 120/77   Pulse: 68   Resp: 18   Temp: 36.8 °C (98.2 °F)   TempSrc: Temporal   SpO2: 95%   Weight: 91.2 kg (201 lb)   Height: 1.727 m (5' 8\")     CBC Results       03/06/20 05/30/19 05/29/19                    1210 0710 0826         WBC 6.06 4.53 5.21         RBC 4.99 3.84 4.03         HGB 10.7 8.6 9.0         HCT 35.7 28.1 29.4         MCV 71.5 73.2 73.0         MCH 21.4 22.4 22.3         MCHC 30.0 30.6 30.6                    Comment for PLT at 1210 on 03/06/20:  RESULTS CHECKED. RESULTS OBTAINED AFTER VORTEXING TO ELIMINATE PLT CLUMPS    Comment for PLT at 0710 on 05/30/19:  Platelet clumps still present after vortexing to elimate platelet clumps. Smear estimate appears ADEQUATE. Order PLATELET(CITRATE TUBE) and redraw if needed.    Comment for PLT at 0826 on 05/29/19:  Platelet clumps still present after vortexing to elimate platelet clumps. Smear estimate appears ADEQUATE. Order PLATELET(CITRATE TUBE) and redraw if needed.        BMP Results       03/06/20 05/30/19 05/29/19                    1210 0710 0829          134 135         K 4.6 4.1 3.4         Cl 104 100 " 98         CO2 22 27 25         Glucose 82 95 107         BUN 12 <5 6         Creatinine 0.9 0.6 0.6         Calcium 9.0 7.1 7.3         Anion Gap 11 7 12         EGFR >60.0 >60.0 >60.0                           The patient does not have an active anticoagulation episode.    No results found for: HCGPREGUR, PREGSERUM, HCG, HCGQUANT    No orders to display     Physical Exam    Airway   Mallampati: II   TM distance: >3 FB   Neck ROM: full  Cardiovascular - normal   Rhythm: regular   Rate: normalDental - normal      Anesthesia Plan    Plan: general    Technique: general endotracheal     Lines and Monitors: PIV     Airway: oral intubation and direct visual laryngoscopy   ASA 2  Anesthetic plan and risks discussed with: patient  Induction:    intravenous   Postop Plan:   Patient Disposition: inpatient floor planned admission   Pain Management: IV analgesics

## 2020-03-12 LAB
ANION GAP SERPL CALC-SCNC: 7 MEQ/L (ref 3–15)
BASOPHILS # BLD: 0.01 K/UL (ref 0.01–0.1)
BASOPHILS NFR BLD: 0.1 %
BUN SERPL-MCNC: 11 MG/DL (ref 8–20)
CALCIUM SERPL-MCNC: 7.8 MG/DL (ref 8.9–10.3)
CHLORIDE SERPL-SCNC: 106 MEQ/L (ref 98–109)
CO2 SERPL-SCNC: 23 MEQ/L (ref 22–32)
CREAT SERPL-MCNC: 1 MG/DL (ref 0.8–1.3)
DIFFERENTIAL METHOD BLD: ABNORMAL
EOSINOPHIL # BLD: 0 K/UL (ref 0.04–0.54)
EOSINOPHIL NFR BLD: 0 %
ERYTHROCYTE [DISTWIDTH] IN BLOOD BY AUTOMATED COUNT: 15.4 % (ref 11.6–14.4)
GFR SERPL CREATININE-BSD FRML MDRD: >60 ML/MIN/1.73M*2
GLUCOSE SERPL-MCNC: 126 MG/DL (ref 70–99)
HCT VFR BLDCO AUTO: 27.2 % (ref 40.1–51)
HGB BLD-MCNC: 8.3 G/DL (ref 13.7–17.5)
IMM GRANULOCYTES # BLD AUTO: 0.03 K/UL (ref 0–0.08)
IMM GRANULOCYTES NFR BLD AUTO: 0.4 %
LYMPHOCYTES # BLD: 1.06 K/UL (ref 1.2–3.5)
LYMPHOCYTES NFR BLD: 15.1 %
MAGNESIUM SERPL-MCNC: 1.9 MG/DL (ref 1.8–2.5)
MCH RBC QN AUTO: 21.6 PG (ref 28–33.2)
MCHC RBC AUTO-ENTMCNC: 30.5 G/DL (ref 32.2–36.5)
MCV RBC AUTO: 70.8 FL (ref 83–98)
MONOCYTES # BLD: 0.53 K/UL (ref 0.3–1)
MONOCYTES NFR BLD: 7.5 %
NEUTROPHILS # BLD: 5.4 K/UL (ref 1.7–7)
NEUTS SEG NFR BLD: 76.9 %
NRBC BLD-RTO: 0 %
PDW BLD AUTO: 10.9 FL (ref 9.4–12.4)
PLATELET # BLD AUTO: ABNORMAL 10*3/UL
POTASSIUM SERPL-SCNC: 4.2 MEQ/L (ref 3.6–5.1)
RBC # BLD AUTO: 3.84 M/UL (ref 4.5–5.8)
SODIUM SERPL-SCNC: 136 MEQ/L (ref 136–144)
WBC # BLD AUTO: 7.03 K/UL (ref 3.8–10.5)

## 2020-03-12 PROCEDURE — 63600000 HC DRUGS/DETAIL CODE: Performed by: STUDENT IN AN ORGANIZED HEALTH CARE EDUCATION/TRAINING PROGRAM

## 2020-03-12 PROCEDURE — 36415 COLL VENOUS BLD VENIPUNCTURE: CPT | Performed by: STUDENT IN AN ORGANIZED HEALTH CARE EDUCATION/TRAINING PROGRAM

## 2020-03-12 PROCEDURE — 25000000 HC PHARMACY GENERAL: Performed by: STUDENT IN AN ORGANIZED HEALTH CARE EDUCATION/TRAINING PROGRAM

## 2020-03-12 PROCEDURE — 21400000 HC ROOM AND CARE CCU/INTERMEDIATE

## 2020-03-12 PROCEDURE — 63700000 HC SELF-ADMINISTRABLE DRUG: Performed by: STUDENT IN AN ORGANIZED HEALTH CARE EDUCATION/TRAINING PROGRAM

## 2020-03-12 PROCEDURE — 85025 COMPLETE CBC W/AUTO DIFF WBC: CPT | Performed by: STUDENT IN AN ORGANIZED HEALTH CARE EDUCATION/TRAINING PROGRAM

## 2020-03-12 PROCEDURE — 80048 BASIC METABOLIC PNL TOTAL CA: CPT | Performed by: STUDENT IN AN ORGANIZED HEALTH CARE EDUCATION/TRAINING PROGRAM

## 2020-03-12 PROCEDURE — 83735 ASSAY OF MAGNESIUM: CPT | Performed by: STUDENT IN AN ORGANIZED HEALTH CARE EDUCATION/TRAINING PROGRAM

## 2020-03-12 PROCEDURE — 99024 POSTOP FOLLOW-UP VISIT: CPT | Performed by: COLON & RECTAL SURGERY

## 2020-03-12 RX ORDER — PANTOPRAZOLE SODIUM 40 MG/1
40 TABLET, DELAYED RELEASE ORAL DAILY
Status: DISCONTINUED | OUTPATIENT
Start: 2020-03-13 | End: 2020-03-16 | Stop reason: HOSPADM

## 2020-03-12 RX ADMIN — GABAPENTIN 600 MG: 300 CAPSULE ORAL at 09:44

## 2020-03-12 RX ADMIN — ACETAMINOPHEN 975 MG: 325 TABLET, FILM COATED ORAL at 09:44

## 2020-03-12 RX ADMIN — KETOROLAC TROMETHAMINE 30 MG: 30 INJECTION, SOLUTION INTRAMUSCULAR at 18:35

## 2020-03-12 RX ADMIN — ENOXAPARIN SODIUM 40 MG: 40 INJECTION SUBCUTANEOUS at 18:35

## 2020-03-12 RX ADMIN — PANTOPRAZOLE SODIUM 40 MG: 40 INJECTION, POWDER, LYOPHILIZED, FOR SOLUTION INTRAVENOUS at 09:45

## 2020-03-12 RX ADMIN — KETOROLAC TROMETHAMINE 30 MG: 30 INJECTION, SOLUTION INTRAMUSCULAR at 06:17

## 2020-03-12 RX ADMIN — ALVIMOPAN 12 MG: 12 CAPSULE ORAL at 20:41

## 2020-03-12 RX ADMIN — ACETAMINOPHEN 975 MG: 325 TABLET, FILM COATED ORAL at 14:45

## 2020-03-12 RX ADMIN — KETOROLAC TROMETHAMINE 30 MG: 30 INJECTION, SOLUTION INTRAMUSCULAR at 11:42

## 2020-03-12 RX ADMIN — ACETAMINOPHEN 975 MG: 325 TABLET, FILM COATED ORAL at 20:41

## 2020-03-12 RX ADMIN — GABAPENTIN 600 MG: 300 CAPSULE ORAL at 20:41

## 2020-03-12 RX ADMIN — ALVIMOPAN 12 MG: 12 CAPSULE ORAL at 09:44

## 2020-03-12 RX ADMIN — MAGNESIUM SULFATE 2 G: 2 INJECTION INTRAVENOUS at 11:41

## 2020-03-12 RX ADMIN — ACETAMINOPHEN 975 MG: 325 TABLET, FILM COATED ORAL at 04:31

## 2020-03-12 NOTE — PROGRESS NOTES
General Surgery Daily Progress Note    Subjective     Interval History: No acute events overnight. Was able to sleep.       Objective     Vital signs in last 24 hours:  Temp:  [36.6 °C (97.8 °F)-37.5 °C (99.5 °F)] 36.6 °C (97.8 °F)  Heart Rate:  [62-88] 88  Resp:  [13-26] 18  BP: ()/(27-80) 99/58    .      Intake/Output Summary (Last 24 hours) at 3/12/2020 0706  Last data filed at 3/12/2020 0437  Gross per 24 hour   Intake 3878.67 ml   Output 2225 ml   Net 1653.67 ml     Intake/Output this shift:  No intake/output data recorded.    Physical Exam  Neuro: AAOx3   CV: regular rate   Pulm: no increased work of breathing   Abdomen: soft, minimally distended, RLQ tenderness, otherwise minimal incisional tenderness. Ostomy pink and viable. Incisions clean, dry and intact. Abdominal DANIA drain with serosanguinous output.   : Dobbins  Extremities: SCDs    VTE Assessment: I have reassessed and the patient's VTE risk and treatment plan is appropriate.    Labs  Labs are pending.  .[facility]    Imaging  I have reviewed the Imaging from the last 24 hrs.      Assessment/Plan   59 year-old male with history of Chron's colitis POD0 s/p laparoscopic proctocolectomy with end ileostomy.      - Clear liquid diet  - Lactated ringers @ 80 cc/h  - ERAS protocol, adequate pain control  - DVT ppx: Lovenix  - GI ppx: PPI  - AM labs  - Encourage IS, OOB, ambulate  Expected Discharge Date:   3/16/2020    9661    Meek Miller MD  progress

## 2020-03-12 NOTE — PATIENT CARE CONFERENCE
Care Progression Rounds Note  Date: 3/12/2020  Time: 10:51 AM     Patient Name: Layton Ritchie Jr.     Medical Record Number: [id number]   YOB: 1960  Sex: Male      Room/Bed: 1023    Admitting Diagnosis: Crohn's disease of colon with complication (CMS/HCC) [K50.119]  Crohn's disease of colon with complication (CMS/HCC) [K50.119]  Crohn's disease of colon with complication (CMS/HCC) [K50.119]   Admit Date/Time: 3/11/2020  5:36 AM    Primary Diagnosis: Crohn's disease of colon with complication (CMS/HCC)  Principal Problem: Crohn's disease of colon with complication (CMS/HCC)    GMLOS: pending  Anticipated Discharge Date: 3/16/2020    AM-PAC  Mobility Score: 24    Discharge Planning:       Barriers to Discharge:  Barriers to Discharge: Medical issues not resolved  Comment: rafaela rawls Sat ghislaine dc home care for packing changes pt/ot    Participants:  , physician, advanced practice provider, social work/services, nursing

## 2020-03-12 NOTE — PROGRESS NOTES
"General Surgery Post-Operative Note    Layton Ritchie Jr. is a 59 y.o. male  s/p laparoscopic proctocolectomy and end ileostomy today.     S  Patient seen and examined bedside. His pain is adequately controlled. Tolerating diet. No nausea or vomiting. Awaiting ostomy function.     O  Vitals:  Blood pressure 112/65, pulse 76, temperature 37.5 °C (99.5 °F), temperature source Temporal, resp. rate 18, height 1.727 m (5' 8\"), weight 94.3 kg (208 lb), SpO2 98 %.    Physical Exam     Neuro: AAOx3   CV: regular rate   Pulm: no increased work of breathing   Abdomen: soft, minimally distended, RLQ tenderness, otherwise minimal incisional tenderness. Ostomy pink and viable. Incisions clean, dry and intact. Abdominal DANIA drain with serosanguinous output.   : Dobbins  Extremities: SCDs    A/P  59 year-old male with history of Chron's colitis POD0 s/p laparoscopic proctocolectomy with end ileostomy.     - Clear liquid diet  - Lactated ringers @ 80 cc/h  - ERAS protocol, adequate pain control  - DVT ppx: Lovenix  - GI ppx: PPI  - AM labs  - Encourage IS, OOB, ambulate      Miguel Veras, DO  Please page #[phone] with questions or concerns.  3/11/2020    "

## 2020-03-12 NOTE — PLAN OF CARE
Problem: Adult Inpatient Plan of Care  Goal: Plan of Care Review  Outcome: Progressing  Flowsheets (Taken 3/12/2020 0504)  Progress: improving  Plan of Care Reviewed With: patient  Outcome Summary: aaox3; VSS; on 2 lo2. castrejon in place. ostomy intact. Gerald drain intact. will continued to montior.

## 2020-03-13 LAB
ANION GAP SERPL CALC-SCNC: 5 MEQ/L (ref 3–15)
ANISOCYTOSIS BLD QL SMEAR: ABNORMAL
BASOPHILS # BLD: 0.02 K/UL (ref 0.01–0.1)
BASOPHILS NFR BLD: 0.4 %
BUN SERPL-MCNC: 10 MG/DL (ref 8–20)
BURR CELLS BLD QL SMEAR: ABNORMAL
CALCIUM SERPL-MCNC: 7.7 MG/DL (ref 8.9–10.3)
CHLORIDE SERPL-SCNC: 108 MEQ/L (ref 98–109)
CO2 SERPL-SCNC: 24 MEQ/L (ref 22–32)
CREAT SERPL-MCNC: 1 MG/DL (ref 0.8–1.3)
DACRYOCYTES BLD QL SMEAR: ABNORMAL
DIFFERENTIAL METHOD BLD: ABNORMAL
EOSINOPHIL # BLD: 0.17 K/UL (ref 0.04–0.54)
EOSINOPHIL NFR BLD: 3 %
ERYTHROCYTE [DISTWIDTH] IN BLOOD BY AUTOMATED COUNT: 15.6 % (ref 11.6–14.4)
GFR SERPL CREATININE-BSD FRML MDRD: >60 ML/MIN/1.73M*2
GLUCOSE SERPL-MCNC: 112 MG/DL (ref 70–99)
HCT VFR BLDCO AUTO: 26.1 % (ref 40.1–51)
HGB BLD-MCNC: 7.9 G/DL (ref 13.7–17.5)
HYPOCHROMIA BLD QL SMEAR: ABNORMAL
IMM GRANULOCYTES # BLD AUTO: 0.01 K/UL (ref 0–0.08)
IMM GRANULOCYTES NFR BLD AUTO: 0.2 %
LYMPHOCYTES # BLD: 1.2 K/UL (ref 1.2–3.5)
LYMPHOCYTES NFR BLD: 21.4 %
MAGNESIUM SERPL-MCNC: 2 MG/DL (ref 1.8–2.5)
MCH RBC QN AUTO: 21.5 PG (ref 28–33.2)
MCHC RBC AUTO-ENTMCNC: 30.3 G/DL (ref 32.2–36.5)
MCV RBC AUTO: 70.9 FL (ref 83–98)
MICROCYTES BLD QL SMEAR: ABNORMAL
MONOCYTES # BLD: 0.34 K/UL (ref 0.3–1)
MONOCYTES NFR BLD: 6 %
NEUTROPHILS # BLD: 3.88 K/UL (ref 1.7–7)
NEUTS SEG NFR BLD: 69 %
NRBC BLD-RTO: 0 %
OVALOCYTES BLD QL SMEAR: ABNORMAL
PDW BLD AUTO: 10.9 FL (ref 9.4–12.4)
PLAT MORPH BLD: NORMAL
PLATELET # BLD AUTO: ABNORMAL 10*3/UL
PLATELET # BLD EST: ABNORMAL 10*3/UL
PLATELET CLUMP BLD QL SMEAR: PRESENT
POLYCHROMASIA BLD QL SMEAR: ABNORMAL
POTASSIUM SERPL-SCNC: 4 MEQ/L (ref 3.6–5.1)
RBC # BLD AUTO: 3.68 M/UL (ref 4.5–5.8)
SCHISTOCYTES BLD QL SMEAR: ABNORMAL
SODIUM SERPL-SCNC: 137 MEQ/L (ref 136–144)
WBC # BLD AUTO: 5.62 K/UL (ref 3.8–10.5)

## 2020-03-13 PROCEDURE — 21400000 HC ROOM AND CARE CCU/INTERMEDIATE

## 2020-03-13 PROCEDURE — 63600000 HC DRUGS/DETAIL CODE: Performed by: STUDENT IN AN ORGANIZED HEALTH CARE EDUCATION/TRAINING PROGRAM

## 2020-03-13 PROCEDURE — 80048 BASIC METABOLIC PNL TOTAL CA: CPT | Performed by: STUDENT IN AN ORGANIZED HEALTH CARE EDUCATION/TRAINING PROGRAM

## 2020-03-13 PROCEDURE — 25800000 HC PHARMACY IV SOLUTIONS: Performed by: STUDENT IN AN ORGANIZED HEALTH CARE EDUCATION/TRAINING PROGRAM

## 2020-03-13 PROCEDURE — 63700000 HC SELF-ADMINISTRABLE DRUG: Performed by: STUDENT IN AN ORGANIZED HEALTH CARE EDUCATION/TRAINING PROGRAM

## 2020-03-13 PROCEDURE — 99024 POSTOP FOLLOW-UP VISIT: CPT | Performed by: COLON & RECTAL SURGERY

## 2020-03-13 PROCEDURE — 85025 COMPLETE CBC W/AUTO DIFF WBC: CPT | Performed by: STUDENT IN AN ORGANIZED HEALTH CARE EDUCATION/TRAINING PROGRAM

## 2020-03-13 PROCEDURE — 97165 OT EVAL LOW COMPLEX 30 MIN: CPT | Mod: GO

## 2020-03-13 PROCEDURE — 36415 COLL VENOUS BLD VENIPUNCTURE: CPT | Performed by: STUDENT IN AN ORGANIZED HEALTH CARE EDUCATION/TRAINING PROGRAM

## 2020-03-13 PROCEDURE — 83735 ASSAY OF MAGNESIUM: CPT | Performed by: STUDENT IN AN ORGANIZED HEALTH CARE EDUCATION/TRAINING PROGRAM

## 2020-03-13 PROCEDURE — 97161 PT EVAL LOW COMPLEX 20 MIN: CPT | Mod: GP

## 2020-03-13 RX ORDER — DEXTROSE MONOHYDRATE, SODIUM CHLORIDE, AND POTASSIUM CHLORIDE 50; 1.49; 4.5 G/1000ML; G/1000ML; G/1000ML
INJECTION, SOLUTION INTRAVENOUS CONTINUOUS
Status: DISCONTINUED | OUTPATIENT
Start: 2020-03-13 | End: 2020-03-15

## 2020-03-13 RX ORDER — TRAMADOL HYDROCHLORIDE 50 MG/1
50 TABLET ORAL EVERY 4 HOURS PRN
Qty: 15 TABLET | Refills: 0 | Status: SHIPPED | OUTPATIENT
Start: 2020-03-13 | End: 2020-03-18

## 2020-03-13 RX ADMIN — ACETAMINOPHEN 975 MG: 325 TABLET, FILM COATED ORAL at 20:41

## 2020-03-13 RX ADMIN — GABAPENTIN 600 MG: 300 CAPSULE ORAL at 20:41

## 2020-03-13 RX ADMIN — ACETAMINOPHEN 975 MG: 325 TABLET, FILM COATED ORAL at 09:43

## 2020-03-13 RX ADMIN — SODIUM CHLORIDE, POTASSIUM CHLORIDE, SODIUM LACTATE AND CALCIUM CHLORIDE 500 ML: 600; 310; 30; 20 INJECTION, SOLUTION INTRAVENOUS at 02:54

## 2020-03-13 RX ADMIN — ALVIMOPAN 12 MG: 12 CAPSULE ORAL at 20:41

## 2020-03-13 RX ADMIN — KETOROLAC TROMETHAMINE 30 MG: 30 INJECTION, SOLUTION INTRAMUSCULAR at 05:00

## 2020-03-13 RX ADMIN — SODIUM CHLORIDE, POTASSIUM CHLORIDE, SODIUM LACTATE AND CALCIUM CHLORIDE 500 ML: 600; 310; 30; 20 INJECTION, SOLUTION INTRAVENOUS at 07:43

## 2020-03-13 RX ADMIN — SODIUM CHLORIDE, POTASSIUM CHLORIDE, SODIUM LACTATE AND CALCIUM CHLORIDE 500 ML: 600; 310; 30; 20 INJECTION, SOLUTION INTRAVENOUS at 23:40

## 2020-03-13 RX ADMIN — KETOROLAC TROMETHAMINE 30 MG: 30 INJECTION, SOLUTION INTRAMUSCULAR at 00:06

## 2020-03-13 RX ADMIN — ALVIMOPAN 12 MG: 12 CAPSULE ORAL at 09:43

## 2020-03-13 RX ADMIN — GABAPENTIN 600 MG: 300 CAPSULE ORAL at 09:43

## 2020-03-13 RX ADMIN — KETOROLAC TROMETHAMINE 30 MG: 30 INJECTION, SOLUTION INTRAMUSCULAR at 19:01

## 2020-03-13 RX ADMIN — PANTOPRAZOLE SODIUM 40 MG: 40 TABLET, DELAYED RELEASE ORAL at 09:43

## 2020-03-13 RX ADMIN — ENOXAPARIN SODIUM 40 MG: 40 INJECTION SUBCUTANEOUS at 18:43

## 2020-03-13 RX ADMIN — KETOROLAC TROMETHAMINE 30 MG: 30 INJECTION, SOLUTION INTRAMUSCULAR at 13:03

## 2020-03-13 RX ADMIN — POTASSIUM CHLORIDE, DEXTROSE MONOHYDRATE AND SODIUM CHLORIDE: 150; 5; 450 INJECTION, SOLUTION INTRAVENOUS at 13:03

## 2020-03-13 ASSESSMENT — COGNITIVE AND FUNCTIONAL STATUS - GENERAL
HELP NEEDED FOR BATHING: 4 - NONE
HELP NEEDED FOR PERSONAL GROOMING: 4 - NONE
TOILETING: 4 - NONE
DRESSING REGULAR UPPER BODY CLOTHING: 4 - NONE
AFFECT: WNL
MOVING TO AND FROM BED TO CHAIR: 4 - NONE
WALKING IN HOSPITAL ROOM: 4 - NONE
EATING MEALS: 4 - NONE
AFFECT: WFL
STANDING UP FROM CHAIR USING ARMS: 4 - NONE
DRESSING REGULAR LOWER BODY CLOTHING: 4 - NONE
CLIMB 3 TO 5 STEPS WITH RAILING: 4 - NONE

## 2020-03-13 NOTE — PLAN OF CARE
Problem: Adult Inpatient Plan of Care  Goal: Plan of Care Review  Outcome: Progressing  Flowsheets (Taken 3/13/2020 2668)  Progress: improving  Plan of Care Reviewed With: patient  Outcome Summary: OT treat, progressing w/ POC

## 2020-03-13 NOTE — NURSING NOTE
"Wound Ostomy Continence Note    Subjective    HPI Patient is a 59 y.o. male who was admitted on 3/11/2020 with a diagnosis of Crohn's disease of colon with complication (CMS/HCC) [K50.119]  Crohn's disease of colon with complication (CMS/HCC) [K50.119]  Crohn's disease of colon with complication (CMS/HCC) [K50.119].    Problem list Problem List:   Patient Active Problem List   Diagnosis   • Crohn's disease of colon with complication (CMS/HCC)   • Basal cell carcinoma (BCC) in situ of skin      PMH/PSH Medical History:   Past Medical History:   Diagnosis Date   • Basal cell carcinoma (BCC) in situ of skin     right nare, left arm    • Crohn's disease of colon with complication (CMS/HCC) 2012    \"Crohn's colitis with megacolon\"     Surgical History:   Past Surgical History:   Procedure Laterality Date   • COLON SURGERY  05/27/2019    Colostomy/Ileostomy Laparoscopic   • OTHER SURGICAL HISTORY Right 12/2019    TAKEDOWN PARAMEDIAN FOREHEAD FLAP   • TEAR DUCT SURGERY  12/2019    repaired after basal cell skin cancer was removed from right side of nose- with flap   • TONSILLECTOMY  1962      Assessment and Recommendation S/p ileostomy revision; stoma, red, protruding; only bowel sweat; pt has ileostomy for 10 months, knew ostomy care; he has no question about the care; WOCN will follow up as needed             Date: 03/13/20  Signature: Norma Smith RN                "

## 2020-03-13 NOTE — PROGRESS NOTES
Patient: Layton Ritchie Jr.  Location: [place] [address]  MRN: [id number]  Today's date: 3/13/2020    Pt returned to bed, no needs at this time.     Ed is a 59 y.o. male admitted on 3/11/2020 with Crohn's disease of colon with complication (CMS/HCC). Principal problem is Crohn's disease of colon with complication (CMS/HCC).    Past Medical History  Ed has a past medical history of Basal cell carcinoma (BCC) in situ of skin and Crohn's disease of colon with complication (CMS/HCC) (2012).    History of Present Illness   s/p laparoscopic proctocolectomy and end ileostomy        Prior Living Environment      Most Recent Value   Living Arrangements  house   Living Environment Comment  pt lives with daughter in 2SH with 1STE, bed and bath on second floor, powder room on first floor          Prior Level of Function      Most Recent Value   Ambulation  independent   Transferring  independent   Toileting  independent   Bathing  independent   Dressing  independent   Eating  independent   Communication  understands/communicates without difficulty   Equipment Currently Used at Home  none          OT Evaluation and Treatment - 03/13/20 1100        Time Calculation    Start Time  1101     Stop Time  1120     Time Calculation (min)  19 min        Session Details    Document Type  initial evaluation     Mode of Treatment  occupational therapy        General Information    Patient Profile Reviewed?  yes     Existing Precautions/Restrictions  fall        Cognition/Psychosocial    Affect/Mental Status (Cognitive)  WNL        Sensory Assessment (Somatosensory)    Sensory Assessment (Somatosensory)  sensation intact        Range of Motion (ROM)    Range of Motion  ROM is WNL        Strength (Manual Muscle Testing)    Strength (Manual Muscle Testing)  strength is WNL        Bed Mobility    [place], Supine to Sit  modified independence     [place], Sit to Supine  modified independence     Comment (Bed  Mobility)  instructed on log roll technique.         Sit to Stand Transfer    [place], Sit to Stand Transfer  independent        Stand to Sit Transfer    [place], Stand to Sit Transfer  independent        Bathing    [place]  modified independence     Comment  recc use of long sponge.         Upper Body Dressing    [place]  independent     Position  supported sitting        Lower Body Dressing    Self-Performance  dons/doffs left sock;dons/doffs right sock     [place]  modified independence     Position  long sitting     Comment  sitting in long sitting in bed.         Toileting    [place]  adjust/manage clothing;perform bladder hygiene;modified independence     Position  supported sitting     Comment  +ileostomy, +castrejon        AM-PAC (TM) - ADL (Current Function)    Putting on and taking off regular lower body clothing?  4 - None     Bathing?  4 - None     Toileting?  4 - None     Putting on/taking off regular upper body clothing?  4 - None     How much help for taking care of personal grooming?  4 - None     Eating meals?  4 - None     AM-PAC (TM) ADL Score  24        Therapy Assessment/Plan (OT)    Rehab Potential (OT)  other (see comments)    dc    Therapy Frequency (OT)  evaluation only        Progress Summary (OT)    Daily Outcome Statement (OT)  Pt presents with mild pain, instructed on compensatory strategies for ADLs and functional mobility. No further OT needs.         Therapy Plan Review/Discharge Plan (OT)    OT Recommended Discharge Disposition  home     Anticipated Equipment Needs At Discharge (OT)  other (see comments);erika    long sponge                  Education provided this session. See the Patient Education summary report for full details.

## 2020-03-13 NOTE — PLAN OF CARE
"  Problem: Adult Inpatient Plan of Care  Goal: Readiness for Transition of Care  Outcome: Progressing     Problem: Adult Inpatient Plan of Care  Goal: Readiness for Transition of Care  Intervention: Mutually Develop Transition Plan  Flowsheets (Taken 3/13/2020 1812)  Anticipated Discharge Disposition: home with assistance; home with home health services (patient currently declining home health, \"had ileostomy, it was just moved\".)  Equipment Needed After Discharge: none  Discharge Coordination/Progress: Per update from CPR, GERRY 3/16 pending progress.  Patient confirmed he was independent prior to admission.  Patients dtr lives with him.  Patient declines dc needs or concerns.  Patient is pod 1 s/p laparoscopic proctocolectomy with end ileostomy.  Patient does not have a PCP, follow with his gastroenterologist.  Care coordination to follow for transition of care needs until dc is complete.  Anticipated Changes Related to Illness: none  Concerns Comments: Patient would benefit from home care f/u, new ileostomy and wound care.  Patient is currently declining.  To follow.  GERRY 3/16.  Current Discharge Risk: chronically ill  Readmission Within the Last 30 Days: no previous admission in last 30 days  Concerns to be Addressed: care coordination/care conferences; discharge planning  Patient's Choice of Community Agency(s): Patient is known to [facility]  Offered/Gave Vendor List: no     "

## 2020-03-13 NOTE — PLAN OF CARE
Problem: Adult Inpatient Plan of Care  Goal: Plan of Care Review  Outcome: Progressing  Flowsheets (Taken 3/13/2020 0539)  Progress: improving  Plan of Care Reviewed With: patient  Outcome Summary: aaox3; BP was low. patient was concern. 500 cc LR bolus was given. no c/o pain. castrejon; ghislaine drain and ostomy are intact. will continued to montior.

## 2020-03-13 NOTE — PROGRESS NOTES
General Surgery Daily Progress Note    Subjective     Interval History: No acute events overnight. Was hypotensive in the 90s overnight responded well to fluid boluses       Objective     Vital signs in last 24 hours:  Temp:  [36.3 °C (97.3 °F)-37.3 °C (99.2 °F)] 37.3 °C (99.2 °F)  Heart Rate:  [60-73] 70  Resp:  [16-18] 16  BP: ()/(51-78) 116/70    .      Intake/Output Summary (Last 24 hours) at 3/13/2020 1348  Last data filed at 3/13/2020 1303  Gross per 24 hour   Intake 980 ml   Output 4730 ml   Net -3750 ml     Intake/Output this shift:  I/O this shift:  In: 480 [P.O.:480]  Out: 1370 [Urine:550; Drains:120; Stool:700]    Physical Exam  Neuro: AAOx3   CV: regular rate   Pulm: no increased work of breathing   Abdomen: soft, minimally distended, RLQ tenderness, otherwise minimal incisional tenderness. Ostomy pink and viable. Incisions clean, dry and intact. Abdominal DANIA drain with serosanguinous output.   : Dobbins  Extremities: SCDs    VTE Assessment: I have reassessed and the patient's VTE risk and treatment plan is appropriate.    Labs  Labs are pending.  .[facility]    Imaging  I have reviewed the Imaging from the last 24 hrs.      Assessment/Plan   59 year-old male with history of Chron's colitis POD0 s/p laparoscopic proctocolectomy with end ileostomy.      - Clear liquid diet  - IVF 50 cc/h  - ERAS protocol, adequate pain control  - DVT ppx: Lovenix  - GI ppx: PPI  - AM labs  - Encourage IS, OOB, ambulate  Expected Discharge Date:   3/16/2020    9661    Meek Miller MD  progress

## 2020-03-13 NOTE — PROGRESS NOTES
Patient: Layton Ritchie Jr.  Location: [place] [address]  MRN: [id number]  Today's date: 3/13/2020    Pt left in bed, alarmed, with personal items and call bell within reach. RN in room reapplying dressing to drain sight.    Ed is a 59 y.o. male admitted on 3/11/2020 with Crohn's disease of colon with complication (CMS/HCC). Principal problem is Crohn's disease of colon with complication (CMS/HCC).    Past Medical History  Ed has a past medical history of Basal cell carcinoma (BCC) in situ of skin and Crohn's disease of colon with complication (CMS/HCC) (2012).    History of Present Illness   s/p laparoscopic proctocolectomy and end ileostomy    PT Vitals    Date/Time Pulse HR Source SpO2 O2 Therapy BP Pt Position [facility]   03/13/20 0759 73 Monitor 91 % None (Room air) 118/70 Lying BMN   03/13/20 0808 64 Monitor 68 % None (Room air) 144/78 Lying BMN      PT Pain    Date/Time Pain Type Pref Pain Scale Location Rating: Rest Rating: Activity [facility]   03/13/20 0759 Pain Assessment number (Numeric Rating Pain Scale) abdomen 3 3 BMN          Prior Living Environment      Most Recent Value   Living Arrangements  house   Living Environment Comment  pt lives with daughter in 2SH with 1STE, bed and bath on second floor, powder room on first floor          Prior Level of Function      Most Recent Value   Ambulation  independent   Transferring  independent   Toileting  independent   Bathing  independent   Dressing  independent   Eating  independent   Communication  understands/communicates without difficulty   Equipment Currently Used at Home  none          PT Evaluation and Treatment - 03/13/20 0756        Time Calculation    Start Time  0756     Stop Time  0810     Time Calculation (min)  14 min        Session Details    Document Type  initial evaluation     Mode of Treatment  individual therapy        General Information    Patient Profile Reviewed?  yes        Cognition/Psychosocial    Affect/Mental Status  "(Cognitive)  WFL        Sensory Assessment (Somatosensory)    Sensory Assessment (Somatosensory)  LE sensation intact        Range of Motion (ROM)    Range of Motion  ROM is WFL        Strength (Manual Muscle Testing)    Strength (Manual Muscle Testing)  strength is WFL        Bed Mobility    [place], Supine to Sit  independent     [place], Sit to Supine  independent     Comment (Bed Mobility)  pt requires increased time 2/2 increased pain in abdomen        Bed to Chair Transfer    Comment  pt refused sitting OOB        Sit to Stand Transfer    [place], Sit to Stand Transfer  independent        Stand to Sit Transfer    [place], Stand to Sit Transfer  independent        Gait Training    [place], Gait  independent     Assistive Device  none     Distance in Feet  125 feet     Gait Pattern Utilized  step-through     Deviations/Abnormal Patterns (Gait)  gait speed decreased;step length decreased     Comment  pt states that he is at his baseline and that he ambulates slowly 2/2 \"bad knees\". dstance limited by bleeding at drain sight, RN aware        Stairs Training    [place], Stairs  independent     Assistive Device  railing     Handrail Location  left side (ascending)     Number of Stairs  4     Ascending Stairs Technique  step-over-step     Descending Stairs Technique  step-over-step    backwards    Comment  pt states he descends stairs backwards at baseline 2/2 \"bad knees\" although is independent. number of stairs limited by bleeding at drain sight. RN made aware        Balance    Balance Assessment  sitting static balance;standing static balance     Static Sitting Balance  WNL     Static Standing Balance  WNL        AM-PAC (TM) - Mobility (Current Function)    Turning from your back to your side while in a flat bed without using bedrails?  4 - None     Moving from lying on your back to sitting on the side of a flat bed without using bedrails?  4 - None     Moving to and from a bed to a " chair?  4 - None     Standing up from a chair using your arms?  4 - None     To walk in a hospital room?  4 - None     Climbing 3-5 steps with a railing?  4 - None     AM-PAC (TM) Mobility Score  24        Therapy Assessment/Plan (PT)    Rehab Potential (PT)  good, to achieve stated therapy goals     Therapy Frequency (PT)  evaluation only        Progress Summary (PT)    Daily Outcome Statement (PT)  pt is independent with all mobility and states that he is at his baseline, skilled PT is no longer required, D/C PT. Rec home when stable for D/C        Therapy Plan Review/Discharge Plan (PT)    PT Recommended Discharge Disposition  home     Anticipated Equipment Needs at Discharge (PT Eval)  none        Plan of Care Review    Plan of Care Reviewed With  patient                       Education provided this session. See the Patient Education summary report for full details.

## 2020-03-13 NOTE — PLAN OF CARE
Problem: Adult Inpatient Plan of Care  Goal: Plan of Care Review  Outcome: Met  Flowsheets (Taken 3/13/2020 0818)  Progress: improving  Plan of Care Reviewed With: patient  Outcome Summary: pt is independent with all mobility and states that he is at his baseline, skilled PT is no longer required, D/C PT. Rec home when stable for D/C     Problem: Adult Inpatient Plan of Care  Goal: Patient-Specific Goal (Individualization)  Outcome: Met  Flowsheets (Taken 3/13/2020 0818)  Patient-Specific Goals (Include Timeframe): return home     Problem: Mobility Impairment  Goal: Optimal Mobility  Outcome: Met

## 2020-03-13 NOTE — PATIENT CARE CONFERENCE
Care Progression Rounds Note  Date: 3/13/2020  Time: 12:08 PM     Patient Name: Layton Ritchie Jr.     Medical Record Number: [id number]   YOB: 1960  Sex: Male      Room/Bed: 1023    Admitting Diagnosis: Crohn's disease of colon with complication (CMS/HCC) [K50.119]  Crohn's disease of colon with complication (CMS/HCC) [K50.119]  Crohn's disease of colon with complication (CMS/HCC) [K50.119]   Admit Date/Time: 3/11/2020  5:36 AM    Primary Diagnosis: Crohn's disease of colon with complication (CMS/HCC)  Principal Problem: Crohn's disease of colon with complication (CMS/HCC)    GMLOS: pending  Anticipated Discharge Date: 3/16/2020    AM-PAC  Mobility Score: 24    Discharge Planning:  Living Arrangements: house    Barriers to Discharge:  Barriers to Discharge: Medical issues not resolved  Comment: advance diet; monitor ostomy output     Participants:  , nursing, occupational therapy, physician, social work/services

## 2020-03-13 NOTE — HOSPITAL COURSE
Ed is a 59 y.o. male admitted on 3/11/2020 with Crohn's disease of colon with complication (CMS/HCC). Principal problem is Crohn's disease of colon with complication (CMS/HCC).    Past Medical History  Ed has a past medical history of Basal cell carcinoma (BCC) in situ of skin and Crohn's disease of colon with complication (CMS/HCC) (2012).    History of Present Illness   s/p laparoscopic proctocolectomy and end ileostomy

## 2020-03-13 NOTE — PLAN OF CARE
Problem: Adult Inpatient Plan of Care  Goal: Plan of Care Review  3/13/2020 1354 by Dede Hall, OT  Outcome: Progressing  Flowsheets (Taken 3/13/2020 1354)  Progress: improving  Plan of Care Reviewed With: patient  Outcome Summary: OT evaluation  3/13/2020 1352 by Dede Hall OT  Outcome: Progressing  Flowsheets (Taken 3/13/2020 1352)  Progress: improving  Plan of Care Reviewed With: patient  Outcome Summary: OT treat, progressing w/ POC

## 2020-03-14 LAB
ANION GAP SERPL CALC-SCNC: 7 MEQ/L (ref 3–15)
ANISOCYTOSIS BLD QL SMEAR: ABNORMAL
BASOPHILS # BLD: 0.02 K/UL (ref 0.01–0.1)
BASOPHILS NFR BLD: 0.3 %
BUN SERPL-MCNC: 14 MG/DL (ref 8–20)
BURR CELLS BLD QL SMEAR: ABNORMAL
CALCIUM SERPL-MCNC: 8.2 MG/DL (ref 8.9–10.3)
CHLORIDE SERPL-SCNC: 105 MEQ/L (ref 98–109)
CO2 SERPL-SCNC: 20 MEQ/L (ref 22–32)
CREAT SERPL-MCNC: 1.1 MG/DL (ref 0.8–1.3)
DIFFERENTIAL METHOD BLD: ABNORMAL
EOSINOPHIL # BLD: 0.29 K/UL (ref 0.04–0.54)
EOSINOPHIL NFR BLD: 3.9 %
ERYTHROCYTE [DISTWIDTH] IN BLOOD BY AUTOMATED COUNT: 15.7 % (ref 11.6–14.4)
GFR SERPL CREATININE-BSD FRML MDRD: >60 ML/MIN/1.73M*2
GLUCOSE SERPL-MCNC: 124 MG/DL (ref 70–99)
HCT VFR BLDCO AUTO: 34 % (ref 40.1–51)
HGB BLD-MCNC: 10.1 G/DL (ref 13.7–17.5)
HYPOCHROMIA BLD QL SMEAR: ABNORMAL
IMM GRANULOCYTES # BLD AUTO: 0.03 K/UL (ref 0–0.08)
IMM GRANULOCYTES NFR BLD AUTO: 0.4 %
LYMPHOCYTES # BLD: 1.68 K/UL (ref 1.2–3.5)
LYMPHOCYTES NFR BLD: 22.5 %
MAGNESIUM SERPL-MCNC: 1.9 MG/DL (ref 1.8–2.5)
MCH RBC QN AUTO: 21.4 PG (ref 28–33.2)
MCHC RBC AUTO-ENTMCNC: 29.7 G/DL (ref 32.2–36.5)
MCV RBC AUTO: 72 FL (ref 83–98)
MICROCYTES BLD QL SMEAR: ABNORMAL
MONOCYTES # BLD: 0.46 K/UL (ref 0.3–1)
MONOCYTES NFR BLD: 6.2 %
NEUTROPHILS # BLD: 4.98 K/UL (ref 1.7–7)
NEUTS SEG NFR BLD: 66.7 %
NRBC BLD-RTO: 0 %
OVALOCYTES BLD QL SMEAR: ABNORMAL
PDW BLD AUTO: 10.4 FL (ref 9.4–12.4)
PLAT MORPH BLD: NORMAL
PLATELET # BLD AUTO: ABNORMAL 10*3/UL
PLATELET # BLD EST: ABNORMAL 10*3/UL
PLATELET CLUMP BLD QL SMEAR: PRESENT
POTASSIUM SERPL-SCNC: 4.6 MEQ/L (ref 3.6–5.1)
RBC # BLD AUTO: 4.72 M/UL (ref 4.5–5.8)
SODIUM SERPL-SCNC: 132 MEQ/L (ref 136–144)
WBC # BLD AUTO: 7.46 K/UL (ref 3.8–10.5)

## 2020-03-14 PROCEDURE — 36415 COLL VENOUS BLD VENIPUNCTURE: CPT | Performed by: STUDENT IN AN ORGANIZED HEALTH CARE EDUCATION/TRAINING PROGRAM

## 2020-03-14 PROCEDURE — 21400000 HC ROOM AND CARE CCU/INTERMEDIATE

## 2020-03-14 PROCEDURE — 25800000 HC PHARMACY IV SOLUTIONS: Performed by: STUDENT IN AN ORGANIZED HEALTH CARE EDUCATION/TRAINING PROGRAM

## 2020-03-14 PROCEDURE — 83735 ASSAY OF MAGNESIUM: CPT | Performed by: STUDENT IN AN ORGANIZED HEALTH CARE EDUCATION/TRAINING PROGRAM

## 2020-03-14 PROCEDURE — 63600000 HC DRUGS/DETAIL CODE: Performed by: STUDENT IN AN ORGANIZED HEALTH CARE EDUCATION/TRAINING PROGRAM

## 2020-03-14 PROCEDURE — 63600000 HC DRUGS/DETAIL CODE: Performed by: PHYSICIAN ASSISTANT

## 2020-03-14 PROCEDURE — 99024 POSTOP FOLLOW-UP VISIT: CPT | Performed by: SURGERY

## 2020-03-14 PROCEDURE — 80048 BASIC METABOLIC PNL TOTAL CA: CPT | Performed by: STUDENT IN AN ORGANIZED HEALTH CARE EDUCATION/TRAINING PROGRAM

## 2020-03-14 PROCEDURE — 63700000 HC SELF-ADMINISTRABLE DRUG: Performed by: STUDENT IN AN ORGANIZED HEALTH CARE EDUCATION/TRAINING PROGRAM

## 2020-03-14 PROCEDURE — 85025 COMPLETE CBC W/AUTO DIFF WBC: CPT | Performed by: STUDENT IN AN ORGANIZED HEALTH CARE EDUCATION/TRAINING PROGRAM

## 2020-03-14 RX ORDER — LOPERAMIDE HYDROCHLORIDE 2 MG/1
2 CAPSULE ORAL 3 TIMES DAILY
Status: DISCONTINUED | OUTPATIENT
Start: 2020-03-14 | End: 2020-03-16 | Stop reason: HOSPADM

## 2020-03-14 RX ADMIN — ACETAMINOPHEN 975 MG: 325 TABLET, FILM COATED ORAL at 14:52

## 2020-03-14 RX ADMIN — LOPERAMIDE HYDROCHLORIDE 2 MG: 2 CAPSULE ORAL at 09:13

## 2020-03-14 RX ADMIN — ALVIMOPAN 12 MG: 12 CAPSULE ORAL at 09:13

## 2020-03-14 RX ADMIN — ACETAMINOPHEN 975 MG: 325 TABLET, FILM COATED ORAL at 20:39

## 2020-03-14 RX ADMIN — GABAPENTIN 600 MG: 300 CAPSULE ORAL at 09:13

## 2020-03-14 RX ADMIN — GABAPENTIN 600 MG: 300 CAPSULE ORAL at 19:30

## 2020-03-14 RX ADMIN — ACETAMINOPHEN 975 MG: 325 TABLET, FILM COATED ORAL at 09:13

## 2020-03-14 RX ADMIN — LOPERAMIDE HYDROCHLORIDE 2 MG: 2 CAPSULE ORAL at 14:52

## 2020-03-14 RX ADMIN — ENOXAPARIN SODIUM 40 MG: 40 INJECTION SUBCUTANEOUS at 18:42

## 2020-03-14 RX ADMIN — MAGNESIUM SULFATE 2 G: 2 INJECTION INTRAVENOUS at 10:38

## 2020-03-14 RX ADMIN — SODIUM CHLORIDE, POTASSIUM CHLORIDE, SODIUM LACTATE AND CALCIUM CHLORIDE 500 ML: 600; 310; 30; 20 INJECTION, SOLUTION INTRAVENOUS at 05:20

## 2020-03-14 RX ADMIN — POTASSIUM CHLORIDE, DEXTROSE MONOHYDRATE AND SODIUM CHLORIDE: 150; 5; 450 INJECTION, SOLUTION INTRAVENOUS at 20:38

## 2020-03-14 RX ADMIN — PANTOPRAZOLE SODIUM 40 MG: 40 TABLET, DELAYED RELEASE ORAL at 09:13

## 2020-03-14 RX ADMIN — POTASSIUM CHLORIDE, DEXTROSE MONOHYDRATE AND SODIUM CHLORIDE: 150; 5; 450 INJECTION, SOLUTION INTRAVENOUS at 05:20

## 2020-03-14 RX ADMIN — LOPERAMIDE HYDROCHLORIDE 2 MG: 2 CAPSULE ORAL at 19:29

## 2020-03-14 RX ADMIN — ALVIMOPAN 12 MG: 12 CAPSULE ORAL at 19:29

## 2020-03-14 NOTE — PROGRESS NOTES
General Surgery Daily Progress Note    Subjective     Interval History: No acute events overnight. Tolerating regular diet. Still has high ostomy output - will start imodium today. Will discuss DANIA drain removal and castrejon catheter out      Objective     Vital signs in last 24 hours:  Temp:  [36.4 °C (97.5 °F)-37.1 °C (98.7 °F)] 36.6 °C (97.9 °F)  Heart Rate:  [73-84] 82  Resp:  [16-18] 16  BP: (102-122)/(60-78) 102/68    .      Intake/Output Summary (Last 24 hours) at 3/14/2020 1408  Last data filed at 3/14/2020 1304  Gross per 24 hour   Intake 1620 ml   Output 6235 ml   Net -4615 ml     Intake/Output this shift:  I/O this shift:  In: 240 [P.O.:240]  Out: 1450 [Urine:475; Stool:975]    Physical Exam  Neuro: AAOx3   CV: regular rate   Pulm: no increased work of breathing   Abdomen: soft, minimally distended, RLQ tenderness, otherwise minimal incisional tenderness. Ostomy pink and viable. Incisions clean, dry and intact. Abdominal DANIA drain with serous output.   : Castrejon  Extremities: SCDs    VTE Assessment: I have reassessed and the patient's VTE risk and treatment plan is appropriate.    Labs  Labs are pending.  .[facility]    Imaging  I have reviewed the Imaging from the last 24 hrs.      Assessment/Plan   59 year-old male with history of Chron's colitis POD2 s/p laparoscopic proctocolectomy with end ileostomy.      - Clear liquid diet  - IVF 50 cc/h  - ERAS protocol, adequate pain control  - DVT ppx: Lovenix  - GI ppx: PPI  - AM labs  - Encourage IS, OOB, ambulate  Expected Discharge Date:   3/16/2020    9661 with questions or concerns    Compa Cain MD

## 2020-03-14 NOTE — PLAN OF CARE
Problem: Adult Inpatient Plan of Care  Goal: Plan of Care Review  Outcome: Progressing  Flowsheets (Taken 3/14/2020 0658)  Progress: improving  Plan of Care Reviewed With: patient  Outcome Summary: AAOX3; VSS; on room air. urine output is low and ostomy output was high. MD was notifiy. patient received 2 bols of LR. no c/o pain. will continued to montior.

## 2020-03-15 LAB
ANION GAP SERPL CALC-SCNC: 6 MEQ/L (ref 3–15)
ANISOCYTOSIS BLD QL SMEAR: ABNORMAL
BASOPHILS # BLD: 0.01 K/UL (ref 0.01–0.1)
BASOPHILS NFR BLD: 0.2 %
BUN SERPL-MCNC: 14 MG/DL (ref 8–20)
BURR CELLS BLD QL SMEAR: ABNORMAL
CALCIUM SERPL-MCNC: 8.4 MG/DL (ref 8.9–10.3)
CHLORIDE SERPL-SCNC: 108 MEQ/L (ref 98–109)
CO2 SERPL-SCNC: 20 MEQ/L (ref 22–32)
CREAT SERPL-MCNC: 0.9 MG/DL (ref 0.8–1.3)
DIFFERENTIAL METHOD BLD: ABNORMAL
EOSINOPHIL # BLD: 0.34 K/UL (ref 0.04–0.54)
EOSINOPHIL NFR BLD: 5.5 %
ERYTHROCYTE [DISTWIDTH] IN BLOOD BY AUTOMATED COUNT: 15.9 % (ref 11.6–14.4)
GFR SERPL CREATININE-BSD FRML MDRD: >60 ML/MIN/1.73M*2
GLUCOSE SERPL-MCNC: 111 MG/DL (ref 70–99)
HCT VFR BLDCO AUTO: 31.9 % (ref 40.1–51)
HGB BLD-MCNC: 9.7 G/DL (ref 13.7–17.5)
HYPOCHROMIA BLD QL SMEAR: ABNORMAL
IMM GRANULOCYTES # BLD AUTO: 0.02 K/UL (ref 0–0.08)
IMM GRANULOCYTES NFR BLD AUTO: 0.3 %
LYMPHOCYTES # BLD: 1.61 K/UL (ref 1.2–3.5)
LYMPHOCYTES NFR BLD: 26.1 %
MAGNESIUM SERPL-MCNC: 1.8 MG/DL (ref 1.8–2.5)
MCH RBC QN AUTO: 21.7 PG (ref 28–33.2)
MCHC RBC AUTO-ENTMCNC: 30.4 G/DL (ref 32.2–36.5)
MCV RBC AUTO: 71.2 FL (ref 83–98)
MICROCYTES BLD QL SMEAR: ABNORMAL
MONOCYTES # BLD: 0.4 K/UL (ref 0.3–1)
MONOCYTES NFR BLD: 6.5 %
NEUTROPHILS # BLD: 3.8 K/UL (ref 1.7–7)
NEUTS SEG NFR BLD: 61.4 %
NRBC BLD-RTO: 0 %
OVALOCYTES BLD QL SMEAR: ABNORMAL
PDW BLD AUTO: 10.7 FL (ref 9.4–12.4)
PLAT MORPH BLD: NORMAL
PLATELET # BLD AUTO: ABNORMAL 10*3/UL
PLATELET # BLD EST: ABNORMAL 10*3/UL
PLATELET CLUMP BLD QL SMEAR: PRESENT
POTASSIUM SERPL-SCNC: 5.2 MEQ/L (ref 3.6–5.1)
RBC # BLD AUTO: 4.48 M/UL (ref 4.5–5.8)
SODIUM SERPL-SCNC: 134 MEQ/L (ref 136–144)
WBC # BLD AUTO: 6.18 K/UL (ref 3.8–10.5)

## 2020-03-15 PROCEDURE — 21400000 HC ROOM AND CARE CCU/INTERMEDIATE

## 2020-03-15 PROCEDURE — 63700000 HC SELF-ADMINISTRABLE DRUG: Performed by: STUDENT IN AN ORGANIZED HEALTH CARE EDUCATION/TRAINING PROGRAM

## 2020-03-15 PROCEDURE — 99024 POSTOP FOLLOW-UP VISIT: CPT | Performed by: SURGERY

## 2020-03-15 PROCEDURE — 36415 COLL VENOUS BLD VENIPUNCTURE: CPT | Performed by: STUDENT IN AN ORGANIZED HEALTH CARE EDUCATION/TRAINING PROGRAM

## 2020-03-15 PROCEDURE — 85025 COMPLETE CBC W/AUTO DIFF WBC: CPT | Performed by: STUDENT IN AN ORGANIZED HEALTH CARE EDUCATION/TRAINING PROGRAM

## 2020-03-15 PROCEDURE — 83735 ASSAY OF MAGNESIUM: CPT | Performed by: STUDENT IN AN ORGANIZED HEALTH CARE EDUCATION/TRAINING PROGRAM

## 2020-03-15 PROCEDURE — 82310 ASSAY OF CALCIUM: CPT | Performed by: STUDENT IN AN ORGANIZED HEALTH CARE EDUCATION/TRAINING PROGRAM

## 2020-03-15 PROCEDURE — 63600000 HC DRUGS/DETAIL CODE: Performed by: STUDENT IN AN ORGANIZED HEALTH CARE EDUCATION/TRAINING PROGRAM

## 2020-03-15 RX ADMIN — GABAPENTIN 600 MG: 300 CAPSULE ORAL at 20:07

## 2020-03-15 RX ADMIN — ACETAMINOPHEN 975 MG: 325 TABLET, FILM COATED ORAL at 06:08

## 2020-03-15 RX ADMIN — PANTOPRAZOLE SODIUM 40 MG: 40 TABLET, DELAYED RELEASE ORAL at 08:27

## 2020-03-15 RX ADMIN — ENOXAPARIN SODIUM 40 MG: 40 INJECTION SUBCUTANEOUS at 17:33

## 2020-03-15 RX ADMIN — GABAPENTIN 600 MG: 300 CAPSULE ORAL at 08:27

## 2020-03-15 RX ADMIN — ACETAMINOPHEN 975 MG: 325 TABLET, FILM COATED ORAL at 14:44

## 2020-03-15 RX ADMIN — LOPERAMIDE HYDROCHLORIDE 2 MG: 2 CAPSULE ORAL at 20:07

## 2020-03-15 RX ADMIN — ALVIMOPAN 12 MG: 12 CAPSULE ORAL at 08:27

## 2020-03-15 RX ADMIN — ACETAMINOPHEN 975 MG: 325 TABLET, FILM COATED ORAL at 20:07

## 2020-03-15 RX ADMIN — LOPERAMIDE HYDROCHLORIDE 2 MG: 2 CAPSULE ORAL at 08:27

## 2020-03-15 RX ADMIN — LOPERAMIDE HYDROCHLORIDE 2 MG: 2 CAPSULE ORAL at 14:44

## 2020-03-15 NOTE — PROGRESS NOTES
General Surgery Daily Progress Note    Subjective     Interval History: No acute events overnight. Tolerating regular diet.  Reduced ostomy output, Imodium started yesterday.  Dobbins catheter removed at midnight, patient did void 1 time after Dobbins catheter removal however there was no PVR recorded.  Will attempt to record a PVR for next void.  Discussed plan with nurse and patient.      Objective     Vital signs in last 24 hours:  Temp:  [36.4 °C (97.5 °F)-36.7 °C (98 °F)] 36.7 °C (98 °F)  Heart Rate:  [71-86] 75  Resp:  [16] 16  BP: (102-125)/(60-77) 125/70    .      Intake/Output Summary (Last 24 hours) at 3/15/2020 0642  Last data filed at 3/15/2020 0600  Gross per 24 hour   Intake 1717.33 ml   Output 3220 ml   Net -1502.67 ml     Intake/Output this shift:  I/O this shift:  In: 960 [I.V.:960]  Out: 1420 [Urine:650; Drains:20; Stool:750]    Physical Exam  Neuro: AAOx3   CV: regular rate   Pulm: no increased work of breathing   Abdomen: soft, minimally distended, RLQ tenderness, otherwise minimal incisional tenderness. Ostomy pink and viable. Incisions clean, dry and intact. Abdominal DANIA drain with serous output.   Extremities: SCDs    VTE Assessment: I have reassessed and the patient's VTE risk and treatment plan is appropriate.    Labs  Labs are pending.  .[facility]    Imaging  I have reviewed the Imaging from the last 24 hrs.      Assessment/Plan   59 year-old male with history of Chron's colitis s/p laparoscopic proctocolectomy with end ileostomy on 3/11 with Dr. Hernandez.     -Tolerating regular diet  - IVF 40 cc/h  - ERAS protocol, adequate pain control  - DVT ppx: Lovenix  - GI ppx: PPI  - AM labs  - Encourage IS, OOB, ambulate    9661 with questions or concerns    Efstratios Trinity, DO

## 2020-03-16 VITALS
SYSTOLIC BLOOD PRESSURE: 124 MMHG | OXYGEN SATURATION: 96 % | BODY MASS INDEX: 29.86 KG/M2 | WEIGHT: 197 LBS | RESPIRATION RATE: 17 BRPM | DIASTOLIC BLOOD PRESSURE: 74 MMHG | HEART RATE: 77 BPM | TEMPERATURE: 98.1 F | HEIGHT: 68 IN

## 2020-03-16 LAB
ANION GAP SERPL CALC-SCNC: 7 MEQ/L (ref 3–15)
ANISOCYTOSIS BLD QL SMEAR: ABNORMAL
BASOPHILS # BLD: 0.02 K/UL (ref 0.01–0.1)
BASOPHILS NFR BLD: 0.3 %
BUN SERPL-MCNC: 14 MG/DL (ref 8–20)
BURR CELLS BLD QL SMEAR: ABNORMAL
CALCIUM SERPL-MCNC: 8.3 MG/DL (ref 8.9–10.3)
CASE RPRT: NORMAL
CHLORIDE SERPL-SCNC: 106 MEQ/L (ref 98–109)
CLINICAL INFO: NORMAL
CO2 SERPL-SCNC: 22 MEQ/L (ref 22–32)
CREAT SERPL-MCNC: 0.9 MG/DL (ref 0.8–1.3)
DIFFERENTIAL METHOD BLD: ABNORMAL
EOSINOPHIL # BLD: 0.25 K/UL (ref 0.04–0.54)
EOSINOPHIL NFR BLD: 4 %
ERYTHROCYTE [DISTWIDTH] IN BLOOD BY AUTOMATED COUNT: 15.9 % (ref 11.6–14.4)
GFR SERPL CREATININE-BSD FRML MDRD: >60 ML/MIN/1.73M*2
GLUCOSE SERPL-MCNC: 98 MG/DL (ref 70–99)
HCT VFR BLDCO AUTO: 30.6 % (ref 40.1–51)
HGB BLD-MCNC: 9.2 G/DL (ref 13.7–17.5)
IMM GRANULOCYTES # BLD AUTO: 0.02 K/UL (ref 0–0.08)
IMM GRANULOCYTES NFR BLD AUTO: 0.3 %
LYMPHOCYTES # BLD: 1.43 K/UL (ref 1.2–3.5)
LYMPHOCYTES NFR BLD: 23 %
MAGNESIUM SERPL-MCNC: 1.5 MG/DL (ref 1.8–2.5)
MCH RBC QN AUTO: 21.5 PG (ref 28–33.2)
MCHC RBC AUTO-ENTMCNC: 30.1 G/DL (ref 32.2–36.5)
MCV RBC AUTO: 71.7 FL (ref 83–98)
MICROCYTES BLD QL SMEAR: ABNORMAL
MONOCYTES # BLD: 0.42 K/UL (ref 0.3–1)
MONOCYTES NFR BLD: 6.8 %
NEUTROPHILS # BLD: 4.08 K/UL (ref 1.7–7)
NEUTS SEG NFR BLD: 65.6 %
NRBC BLD-RTO: 0 %
OVALOCYTES BLD QL SMEAR: ABNORMAL
PATH REPORT.FINAL DX SPEC: NORMAL
PATH REPORT.GROSS SPEC: NORMAL
PDW BLD AUTO: 10.7 FL (ref 9.4–12.4)
PLATELET # BLD AUTO: 218 K/UL (ref 150–350)
PLATELET # BLD AUTO: 232 K/UL (ref 150–350)
PLATELET # BLD EST: ABNORMAL 10*3/UL
PLATELET CLUMP BLD QL SMEAR: PRESENT
POIKILOCYTOSIS BLD QL SMEAR: ABNORMAL
POLYCHROMASIA BLD QL SMEAR: ABNORMAL
POTASSIUM SERPL-SCNC: 4.7 MEQ/L (ref 3.6–5.1)
RBC # BLD AUTO: 4.27 M/UL (ref 4.5–5.8)
SODIUM SERPL-SCNC: 135 MEQ/L (ref 136–144)
WBC # BLD AUTO: 6.22 K/UL (ref 3.8–10.5)

## 2020-03-16 PROCEDURE — 63700000 HC SELF-ADMINISTRABLE DRUG: Performed by: STUDENT IN AN ORGANIZED HEALTH CARE EDUCATION/TRAINING PROGRAM

## 2020-03-16 PROCEDURE — 36415 COLL VENOUS BLD VENIPUNCTURE: CPT | Performed by: STUDENT IN AN ORGANIZED HEALTH CARE EDUCATION/TRAINING PROGRAM

## 2020-03-16 PROCEDURE — 80048 BASIC METABOLIC PNL TOTAL CA: CPT | Performed by: STUDENT IN AN ORGANIZED HEALTH CARE EDUCATION/TRAINING PROGRAM

## 2020-03-16 PROCEDURE — 85025 COMPLETE CBC W/AUTO DIFF WBC: CPT | Performed by: STUDENT IN AN ORGANIZED HEALTH CARE EDUCATION/TRAINING PROGRAM

## 2020-03-16 PROCEDURE — 83735 ASSAY OF MAGNESIUM: CPT | Performed by: STUDENT IN AN ORGANIZED HEALTH CARE EDUCATION/TRAINING PROGRAM

## 2020-03-16 PROCEDURE — 99024 POSTOP FOLLOW-UP VISIT: CPT | Performed by: COLON & RECTAL SURGERY

## 2020-03-16 PROCEDURE — 200200 PR NO CHARGE: Performed by: COLON & RECTAL SURGERY

## 2020-03-16 RX ORDER — LANOLIN ALCOHOL/MO/W.PET/CERES
400 CREAM (GRAM) TOPICAL ONCE
Status: COMPLETED | OUTPATIENT
Start: 2020-03-16 | End: 2020-03-16

## 2020-03-16 RX ORDER — LOPERAMIDE HCL 2 MG
2 TABLET ORAL AS NEEDED
Qty: 60 TABLET | Refills: 0 | Status: SHIPPED | OUTPATIENT
Start: 2020-03-16 | End: 2020-08-11 | Stop reason: ALTCHOICE

## 2020-03-16 RX ADMIN — PANTOPRAZOLE SODIUM 40 MG: 40 TABLET, DELAYED RELEASE ORAL at 09:10

## 2020-03-16 RX ADMIN — LOPERAMIDE HYDROCHLORIDE 2 MG: 2 CAPSULE ORAL at 09:10

## 2020-03-16 RX ADMIN — Medication 400 MG: at 10:59

## 2020-03-16 RX ADMIN — GABAPENTIN 600 MG: 300 CAPSULE ORAL at 09:09

## 2020-03-16 RX ADMIN — ACETAMINOPHEN 975 MG: 325 TABLET, FILM COATED ORAL at 09:08

## 2020-03-16 NOTE — PLAN OF CARE
Per CPR, patient will be discharged to home today; will not need packing changes; can still offer home care; spoke with patient; is agreeable to home care; referral made via [employer] to [facility]; confirmed referral accepted.

## 2020-03-16 NOTE — DISCHARGE SUMMARY
Inpatient Discharge Summary    BRIEF OVERVIEW  Admitting Provider: James Hernandez MD  Discharge Provider: No att. providers found  Primary Care Physician at Discharge: Pt States, No Pcp [phone]     Admission Date: 3/11/2020     Discharge Date: 3/16/2020    Primary Discharge Diagnosis  Crohn's disease of colon with complication (CMS/HCC)    Secondary Discharge Diagnosis      Discharge Disposition  Home   Code Status at Discharge: Prior    Discharge Medications     Medication List      START taking these medications    loperamide 2 mg tablet  Commonly known as:  IMODIUM A-D  Take 1 tablet (2 mg total) by mouth as needed for diarrhea (Take as needed up to three times daily).  Dose:  2 mg     traMADoL 50 mg tablet  Commonly known as:  ULTRAM  Take 1 tablet (50 mg total) by mouth every 4 (four) hours as needed for moderate pain or severe pain for up to 5 days.  Dose:  50 mg        CONTINUE taking these medications    STELARA SUBQ  Inject under the skin every 2 (two) months. Last 1/23/20            Active Issues Requiring Follow-up      Outpatient Follow-Up            In 2 weeks James Hernandez MD [facility]              Test Results Pending at Discharge   Order Current Status    [place] Plt count Collected (03/16/20 0700)    Pathology Tissue Exam In process          DETAILS OF HOSPITAL STAY    Presenting Problem/History of Present Illness  Crohn's disease of colon with complication (CMS/HCC) [K50.119]  Crohn's disease of colon with complication (CMS/HCC) [K50.119]  Crohn's disease of colon with complication (CMS/HCC) [K50.119]      Operative Procedures Performed  Procedure(s):  PROCTOCOLECTOMY LAPAROSCOPIC ROBOTIC, END ILEOSTOMY      Hospital Course  Layton Ritchie is a 59 year old male s/p diverting loop ileostomy with blowhole colostomy in 5/19 for fulminant Crohn's colitis with megacolon who was admitted 3/11/2020 after a laparoscopic proctocolectomy and end  ileostomy. Post operatively, the patient remained hemodynamically stable in the PACU and was admitted to the surgical stepdown unit where he was started on a clear liquid diet and continued on IV fluids. For dinner on post op day 1, he began to eat a GI soft diet, although on post op day 2, he began to have high-volume, watery output from his ileostomy. On post op day 3, he maintained the GI soft diet and due to the high watery output, Imodium was started. After a few doses of Imodium, the ileostomy output began to decrease, and the Dobbins catheter was removed. On post op day 5, the DANIA drain that was placed intraoperatively was removed, home care was set up for ileostomy care, and he was discharged with stable vital signs.

## 2020-03-16 NOTE — CONSULTS
"Wound Ostomy Continence Note    Subjective    HPI Patient is a 59 y.o. male who was admitted on 3/11/2020 with a diagnosis of Crohn's disease of colon with complication (CMS/HCC) [K50.119]  Crohn's disease of colon with complication (CMS/HCC) [K50.119]  Crohn's disease of colon with complication (CMS/HCC) [K50.119].    Problem list Problem List:   Patient Active Problem List   Diagnosis   • Crohn's disease of colon with complication (CMS/HCC)   • Basal cell carcinoma (BCC) in situ of skin      PMH/PSH Medical History:   Past Medical History:   Diagnosis Date   • Basal cell carcinoma (BCC) in situ of skin     right nare, left arm    • Crohn's disease of colon with complication (CMS/HCC) 2012    \"Crohn's colitis with megacolon\"     Surgical History:   Past Surgical History:   Procedure Laterality Date   • COLON SURGERY  05/27/2019    Colostomy/Ileostomy Laparoscopic   • OTHER SURGICAL HISTORY Right 12/2019    TAKED[place] PARAMEDIAN FOREHEAD FLAP   • TEAR DUCT SURGERY  12/2019    repaired after basal cell skin cancer was removed from right side of nose- with flap   • TONSILLECTOMY  1962      Assessment and Recommendation Pt is a pro for ostomy care since he had ileostomy revision; he has no questions;  No more WO follow up as needed;             Date: 03/16/20  Signature: Norma Smith RN                "

## 2020-03-16 NOTE — DISCHARGE INSTRUCTIONS
Follow-up: Please call Dr. Hernandez's office at [phone] upon discharge to schedule your follow-up appointment for 2 weeks. Please call Dr. Hernandez's office if you have any questions/concerns. Call if you experience the following: fevers (>101)/chills, nausea, vomiting, diarrhea, worsening abdominal pain/distention, headache, lightheadedness, dizziness or changes in vision.     Dr. Hernandez's office: [facility] [address], [address]    Please follow-up with your primary care physician within 2 weeks of discharge from the hospital to update them regarding your hospital stay.     Medications:   Please resume all of your previous home medications unless otherwise indicated.    You are being discharged on the following medications:     1) Imodium has been prescribed for you. Take as needed, up to three times a day, for high ileostomy output.     PAIN: Take Tramadol 1 tablet every 6 hours as needed for severe pain. Alternatively, take TYLENOL 650mg every 4 hours as needed for mild pain. Do not drive while taking narcotic pain medication.    Diet: Low residue, low fiber. Try to avoid fresh fruits and vegetables, bran, whole grains, and carbonated beverages.    Wound:  Please record your ileostomy output daily. If your output is greater than 1.2L in two consecutive days, please call the office. If your output is greater than 1.5L in one day, please call the office. Please shower daily. No tub baths until seen in follow-up. No lotions or ointments to wounds      Dr. James Hernandez  [facility]  [address]  [facility] [address] [place] [address]  [phone]    Ileostomy Home Guide    An ileostomy is an opening for stool to leave your body when a medical condition prevents it from leaving through the usual opening (rectum). During a surgery, a piece of small intestine (ileum) is brought through an opening in the abdominal wall. The new opening is called a stoma or ostomy. A pouch fits over the stoma to catch stool  and gas. Your stool may be liquid at first. Over time, it may become about the consistency of loose paste.    CARING FOR YOUR STOMA  Normally, the stoma looks a lot like the inside of your cheek: pink and moist. At first, it may be swollen, but this swelling will decrease within 4- 6 weeks.  Keep the skin around the stoma clean and dry. You can gently wash the skin around the stoma in the shower with a clean, soft washcloth. If you develop any skin irritation, your healthcare provider may give you a stomal skin powder to help heal the area. Do not use any products other than those specifically given to you by your caregiver.   Your stoma should not be uncomfortable. If you notice any stinging or burning, your pouch may be leaking and the skin around your stoma may be coming into contact with stool. This can cause skin irritation. If you notice stinging, you should replace your pouch with a new one and discard the old one.    OSTOMY POUCHES  The pouch that fits over the ostomy can be made up of either 1 or 2 pieces. A one-piece pouch has a skin barrier and the pouch adhered together in one unit. A two-piece pouch has a skin barrier with a separate pouch that snaps on and off of the skin barrier. Either way, you should empty the pouch when it is only one third full. Do not let more stool or gas build up. This could cause the pouch to leak. Some ostomy pouches have a built-in gas release valve. Ostomy pouch deodorizers can be put into the pouch to minimize odor when emptying.  Although, ileostomies usually do not have a great deal of odor when emptying.  You should not expect to have odor except if emptying.       As your swelling in your abdomen and colostomy decrease and the contour/shape changes---expect you might need a different style of pouch.  While receiving Visiting Nurse, they might order different pouches.    EMPTYING YOUR OSTOMY POUCH  You will get lessons on how to empty your pouch from the nursing staff  and/or a Wound-Ostomy-Continence nurse before you leave the hospital. Here are the basic steps:    · Sit far back on the toilet.  · Put pieces of toilet paper onto the toilet water. This will prevent splashing as you empty the stool into the toilet bowl.  · Unclip or unvelcro the tail end of the pouch.  · If desired, you may insert some water to loosen the stool.  · Pinch the end of the pouch closed and lower it between your legs towards the toilet.  · Empty stool contents into the toilet.  · Clean the tail with toilet paper.  · Apply clip or velcro to close it.  · Wash your hands.    CHANGING YOUR OSTOMY POUCH  Change your ostomy pouch about every 3 to 4 days for the first 6 weeks.  It might be possible to wait 5 to 7 days to change it later on after the swelling lessens. Always change the pouch sooner if you begin to notice any discomfort or irritation of the skin around the stoma. When possible, plan to change your ostomy pouching system before eating and drinking because this will lessen the chance of stool coming out during the change. The nursing staff and/or Wound-Ostomy-Continence Nurse may teach you how to change your pouch before you leave the hospital. Here are the basic steps:    · Lay out your supplies  · Carefully remove the old pouch.  · Wash the skin around the stoma. Allow the area to dry. Men may be advised to shave any hair around the stoma very carefully. This will make the adhesive stick better and not pull so hard when removing the pouch when changing.  · Use the stoma measuring guide that comes with your pouch set to decide what size opening you will need to cut in the skin barrier piece. Choose the smallest possible size that will hold the stoma but will not touch it.  If the stoma is an oval, cut it an oval.  If the stoma is a [place], then cut it a [place].  It should not allow skin to be exposed as the stool will make it sore if it constantly sits on it.  Trace the [place] or the pattern on  "the back of the skin barrier piece. Cut out the opening.  · Hold the cut out skin barrier over the stoma to make sure the opening is the correct size.  Modify it as needed.  Skin should not be exposed.  · Apply an extra skin barrier ring or squeeze stoma paste on the back of the skin barrier around the opening to fill in any fine creases if instructed by the healthcare provider.  · Clean and dry the skin around the stoma again if needed.  · Carefully fit the skin barrier over your stoma.  · If you are using a two-piece pouch, snap the pouch onto the skin barrier piece.  · Close the tail of the pouch.  · Put your hand over the top of the skin barrier piece to help warm it for about 5 minutes, so that it conforms to your body better.  · Wash your hands.    DIET TIPS  Because you have a higher risk of a blockage(obstruction) after getting an ileostomy, you should decrease your fiber intake.     Fibrous foods include:  · Celery.  · Cabbage (saurkraut and coleslaw).  · Pineapple.  · Mushrooms.  · Corn and popcorn.  · Whole fruits and vegetables, especially with the skins on.(Canned fruits are fine except for pineapple)  · Foods that contain seeds and whole nuts.  Smooth \"butters\" are Ok(creamy peanut butter).  · Oranges, grapefruit, tangerines, and other citrus fruit.  · Sausage casings     Other tips:  · Drink about eight, 8 oz glasses of water each day.  · You can prevent gas by eating slowly and chewing your food thoroughly.  · If you feel concerned that you have too much gas, you can cut back on gas-producing foods, such as:  ? Spicy foods--onions and garlic.  ? Cruciferous vegetables (cabbage, broccoli, cauliflower, Ayden sprouts).  ? Beans and legumes.  ? Some cheeses.  ? Eggs.  ? Fish.  ? Bubbly (carbonated) drinks.  ? Chewing gum  ? Drinking from a straw    GENERAL TIPS  · You can shower with or without the pouch in place.  · Always keep the pouch snapped on if you are bathing or swimming.  · If your pouch " gets wet, you can dry it with a blow-dryer set to cool or bath towel.  · Avoid wearing tight clothing directly over your stoma so that it does not become irritated or bleed. Tight clothing can also prevent the stool from draining into the pouch, which can cause it to leak.  · It is helpful to always have an extra skin barrier and pouch with you when traveling. Do not leave them anywhere in the excessive heat or  cold.  The adherence property is not as good when exposed to extremes in temperature.  · Try to keep the seat belt either above or below your stoma, or use a tiny pillow to cushion it.  · You can still participate in sports, but you should avoid activities in which there is a risk of getting hit in the abdomen.  · You can still have sex. It is a good idea to empty your pouch prior to sex. Some people and their partners feel very comfortable seeing the pouch during sex. Others choose to wear lingerie or a T-shirt that covers the device.  · If you still have your rectum & anus, then expect to feel like you need to go to the bathroom and expel something.  This is normal.  Sit on the commode, bear down like you are having a BM.  The first few times this happens, it will have stool flecks and some old blood in it.  Later on, it will just be mucus. This will occur anytime.    SEEK IMMEDIATE MEDICAL CARE IF:  · You feel dizzy, light-headed, or faint.  · You measure pouch drainage of more than 1,200 mL per day. This amount of drainage can lead to dehydration.  · You notice a change in the size or color of the stoma, especially if it becomes very red, purple, black, or pale white.  · You have bloody stools or excessive bleeding from the ostomy(a smear or pinpoint drops are OK).  · You have unusual abdominal pain, nausea, vomiting, or bloating.  · There is anything unusual protruding from the ostomy.  · You have irritation or red skin around the ostomy.  · No stool or gas is passing from the stoma.  · You have  "excessive diarrhea (requiring pouch emptying more frequently than normal).    · Listed below are corporate partner for additional information and ostomy product samples:    ORGANIZATION  BRIEF DESCRIPTION  WEBSITE  TELEPHONE NUMBER    American Cancer Society  A worldwide non-profit organization that helps people stay well and get well; helps find cures and fight back against cancer; and offers support groups.  www.cancer.org  1-043-981-8158    Crohn's and Colitis Foundation of [place]  A non-profit, volunteer-driven organization dedicated to finding the cures for Crohn's disease and ulcerative colitis; and offers support groups.  www.ccfa.org  5-601-395-3824    [employer]  A non-profit organization that provides free ostomy supplies: The recipient pays for shipping and handling.  www.ostogroup.org  1-183.101.3824    United Ostomy Associations of [place] (UOAA)  An association of affiliated, nonprofit, support groups who are committed to the improvement of the quality of life of people who have, or will have, an intestinal or urinary diversion.  www.ostomy.org  1-083-850-9063    Wound, Ostomy and Continence Nurses Society  A professional nursing society whose members provide and direct the care of people with ostomies. The Society's website can be searched using the \"Patient Links\" tab to find a nurse who is available for a referral or consultation for ostomy service in or near a patient's geographic location.  www.wocn.org  2-382-290-4017              WEBSITE  TELEPHONE NUMBER   [employer]  www.coloplast.[employer] 5-258-248-3116   [employer] www.convatec.[employer] 7-382-775-4025   [employer] www.cymedostomy.com 3-638-726-6367   [employer] www.[employer] 1-630.352.9154   [employer] www.Deminos 1-933.828.4499   [place]. www.[employer] 5-730-274-1457     This information is not intended to replace advice given to you by your health care provider. Make " sure you discuss any questions you have with your health care provider.  Document Released: 12/20/2004 Document Revised: 01/08/2016 Document Reviewed: 08/30/2016  [employer] Patient Education © 2017 [employer]    Norma Smith RN, CWOCN 03/13/2020

## 2020-03-16 NOTE — NURSING NOTE
Assessment completed     Tele monitor  _SR___   @ _76____    Pt resting in bed, no c/o   Plan of care reviewed     Bed alarm on

## 2020-03-16 NOTE — PATIENT CARE CONFERENCE
"Care Progression Rounds Note  Date: 3/16/2020  Time: 10:44 AM     Patient Name: Layton Ritchie Jr.     Medical Record Number: [id number]   YOB: 1960  Sex: Male      Room/Bed: 1023    Admitting Diagnosis: Crohn's disease of colon with complication (CMS/HCC) [K50.119]  Crohn's disease of colon with complication (CMS/HCC) [K50.119]  Crohn's disease of colon with complication (CMS/HCC) [K50.119]   Admit Date/Time: 3/11/2020  5:36 AM    Primary Diagnosis: Crohn's disease of colon with complication (CMS/HCC)  Principal Problem: Crohn's disease of colon with complication (CMS/HCC)    GMLOS: 5.9  Anticipated Discharge Date: 3/16/2020    AM-PAC  Mobility Score: 24    Discharge Planning:  Living Arrangements: house  Anticipated Discharge Disposition: home with assistance, home with home health services(patient currently declining home health, \"had ileostomy, it was just moved\".)    Barriers to Discharge:  Barriers to Discharge: Medical issues not resolved  Comment: advance diet; monitor ostomy output     Participants:  advanced practice provider, , social work/services, nursing  "

## 2020-03-16 NOTE — PLAN OF CARE
Problem: Adult Inpatient Plan of Care  Goal: Plan of Care Review  Outcome: Progressing  Flowsheets (Taken 3/16/2020 0529)  Progress: improving  Plan of Care Reviewed With: patient  Outcome Summary: VSS. Denies pain. Voiding. Ilestomy with stool and gas. Ambulating independently in room and halls.

## 2020-03-16 NOTE — PROGRESS NOTES
General Surgery Daily Progress Note    Subjective     Interval History: No acute events overnight. Tolerating GI soft diet.  On imodium 2 mg TID, ostomy output 775 over last 24 hours.       Objective     Vital signs in last 24 hours:  Temp:  [36.4 °C (97.6 °F)-37.2 °C (98.9 °F)] 37.2 °C (98.9 °F)  Heart Rate:  [66-93] 80  Resp:  [16-18] 16  BP: (114-143)/(66-85) 120/76    .      Intake/Output Summary (Last 24 hours) at 3/16/2020 0858  Last data filed at 3/16/2020 0458  Gross per 24 hour   Intake 2288 ml   Output 2105 ml   Net 183 ml     Intake/Output this shift:  No intake/output data recorded.    Physical Exam  Neuro: AAOx3   CV: regular rate   Pulm: no increased work of breathing   Abdomen: soft, minimally distended, RLQ tenderness, otherwise minimal incisional tenderness. Stoma pink and viable. Incisions clean, dry and intact. Abdominal ADNIA drain with serosanguinous output.   Extremities: SCDs    VTE Assessment: I have reassessed and the patient's VTE risk and treatment plan is appropriate.    Labs  Labs are pending.  .[facility]    Imaging  I have reviewed the Imaging from the last 24 hrs.      Assessment/Plan   59 year-old male with history of Chron's colitis s/p laparoscopic proctocolectomy with end ileostomy on 3/11 with Dr. Hernandez.     - GI soft diet  - ERAS protocol, adequate pain control  - Daily packing changes   - DVT ppx: Lovenox  - GI ppx: PPI  - AM labs  - Encourage IS, OOB, ambulate    Page [phone] with questions or concerns    Britney Jara MD

## 2020-03-16 NOTE — NURSING NOTE
Monitor and sahara zelaya'd in preparation for discharge     Discharge instructions reviewed with pt  Medication teaching done, denies further questions

## 2020-03-16 NOTE — NURSING NOTE
Pt voiding w/ PVRs of 100-200cc. Pt became very upset with last bladder scan, stating he will leave AMA if we continue to bladder scan him or decide to put a catheter in. Sravan Veras DO notified and came to bedside to discuss with patient. Complications of urinary retention discussed with patient. Will discontinue bladder scans.

## 2020-03-18 ENCOUNTER — TELEPHONE (OUTPATIENT)
Dept: SURGERY | Facility: CLINIC | Age: 60
End: 2020-03-18

## 2020-03-18 NOTE — TELEPHONE ENCOUNTER
Please see above.  Do you want any wound care orders? I can call Marisol back and let her know and will also have her fax over home care paperwork

## 2020-03-18 NOTE — TELEPHONE ENCOUNTER
Marisol called from [facility] and wants to know If Dr Hernandez will sign homecare orders for him. She also wants to know if they should be packing his wounds.     Phone # [phone]

## 2020-03-19 NOTE — TELEPHONE ENCOUNTER
Of course I will be signing his home care orders. No, I don’t want packing in his wounds. He should do daily showers, let the wounds irrigate out, and pat dry. Then cover to protect his clothes.

## 2020-03-19 NOTE — TELEPHONE ENCOUNTER
Called Marisol and let her know the information she will also  be faxing over the home care paperwork later on today for you to sign

## 2020-03-30 ENCOUNTER — TELEMEDICINE (OUTPATIENT)
Dept: SURGERY | Facility: CLINIC | Age: 60
End: 2020-03-30
Payer: COMMERCIAL

## 2020-03-30 DIAGNOSIS — K50.119 CROHN'S DISEASE OF COLON WITH COMPLICATION (CMS/HCC): Primary | ICD-10-CM

## 2020-03-30 PROCEDURE — 99024 POSTOP FOLLOW-UP VISIT: CPT | Mod: 95 | Performed by: COLON & RECTAL SURGERY

## 2020-03-30 NOTE — PROGRESS NOTES
Colorectal Postoperative Follow-up     Layton Ritchie Jr. had robotic proctocolectomy with ileostomy revision on 3/11/2020.    We had a virtual postoperative visit today due to the COVID issues.    Unfortunately, he does not have any video capability.    He is doing quite well, however.  He has no abdominal pain.  He is not having any problems with his ileostomy appliance.  The previous ileostomy incision is just below the new site but he is having only minimal drainage and does not appear to be having problems with leaking from the appliance in this location.  The colostomy site in the left upper quadrant is also filling in nicely with minimal drainage.  He is not having any redness at any of the port sites.    His energy level is improving nicely.  He has been eating well.  Overall, he feels fairly good at this point.    We reviewed his pathology which showed inflammatory bowel disease but no evidence of precancerous changes.      Final Diagnosis   Date Value Ref Range Status   03/11/2020   Final    A.  Ileostomy:    Fragment of skin and glandular mucosa lined tissue with moderate chronic inflammation.  There is no evidence of malignancy.    B.  TOTAL PROCTOCOLECTOMY:    Segment of small bowel with acute serositis  Transverse colon, descending colon, sigmoid colon, rectum with crypt abscesses, crypt architectural distortion, lymphoid hyperplasia, inflammatory pseudopolyps, mucosal ulceration, transmural inflammation consistent with inflammatory bowel disease.  There is no evidence of malignancy or high-grade dysplasia.  Appendix with fibrofatty luminal obliteration.  25 regional lymph nodes with no pathologic change.               Assessment / Plan    Crohn's disease of colon with complication (CMS/HCC)  Ed is doing very well.  He is very happy with his results thus far.  He does not seem to be having significant problems with the stoma appliance despite its proximity to the previous stoma site.  He will continue  to avoid heavy lifting until 6 weeks from surgery.  He will broaden his diet, but will still avoid food that could get caught in the ileostomy.  We will plan a postoperative visit in 1 month, hopefully that can be in person.

## 2020-03-30 NOTE — ASSESSMENT & PLAN NOTE
Ed is doing very well.  He is very happy with his results thus far.  He does not seem to be having significant problems with the stoma appliance despite its proximity to the previous stoma site.  He will continue to avoid heavy lifting until 6 weeks from surgery.  He will broaden his diet, but will still avoid food that could get caught in the ileostomy.  We will plan a postoperative visit in 1 month, hopefully that can be in person.

## 2020-03-30 NOTE — PROGRESS NOTES
{Requirements to bill for telehealth services listed below  1) Rendering provider must be a physician (DO or MD), CRNP or PA  2) Patient must be an established patient  3) This encounter cannot be related to a medical visit within the previous 7 days, or lead to a medical visit within the next 24 hours (or soonest appointment available)  4) Patient must verbally consent to this service, and consent must be documented in the medical record (read the statement below to patient, and document patient™s response via the provided SmartList)  Time spent on the call must be documented (document this using SmartList at end of encounter template)    Read this consent to the patient, then answer the Yes/No SmartList :[id number]}    Request for Consent:  You and I are about to have a telemedicine check-in or visit. This is allowed because you are already my patient, and you have requested it.  This telemedicine visit will be billed to your health insurance or you, if you are self-insured.  You understand you will be responsible for any copayments or coinsurances that apply to your telemedicine visit.  Before starting our telemedicine visit, I am required to get your consent for this virtual check-in or visit by telemedicine. Do you consent?      Patient Response to Request for Consent: {Yes/No:[id number]}    The following have been reviewed and updated as appropriate in this visit:         Visit Documentation:  ***    {Telehealth CPT codes and required encounter duration  25587 = 5-10 minutes  75239 = 11-20 minutes  61278 = 21-30 minutes :67942}    Time Spent in Medical Discussion During This Encounter:  {[facility] Telemedicine Visit Time:[id number]}

## 2020-03-30 NOTE — LETTER
Dear Dr. Miner,    I had a telemedicine visit with Layton Ritchie Jr. today.    Please see my note below.    If you have any questions about his  care, please do not hesitate to call.    This is a challenging time for all of us.  We continue to provide the urgent care to our patients as needed.  We will continue to try to keep our staff and patients safe, and minimize personal contact as much as possible.    If you have urgent surgical needs with your patients, and have any difficulty reaching me through our office, my personal cell phone is: [phone].    Stay safe.    Sincerely,    Mk Hernandez MD

## 2020-07-20 ENCOUNTER — OFFICE VISIT (OUTPATIENT)
Dept: SURGERY | Facility: CLINIC | Age: 60
End: 2020-07-20
Payer: COMMERCIAL

## 2020-07-20 VITALS
BODY MASS INDEX: 29.55 KG/M2 | DIASTOLIC BLOOD PRESSURE: 97 MMHG | HEART RATE: 60 BPM | WEIGHT: 195 LBS | HEIGHT: 68 IN | SYSTOLIC BLOOD PRESSURE: 162 MMHG

## 2020-07-20 DIAGNOSIS — K50.119 CROHN'S DISEASE OF COLON WITH COMPLICATION (CMS/HCC): Primary | ICD-10-CM

## 2020-07-20 PROCEDURE — 99213 OFFICE O/P EST LOW 20 MIN: CPT | Performed by: COLON & RECTAL SURGERY

## 2020-07-20 NOTE — ASSESSMENT & PLAN NOTE
Ed has completely recovered from his proctocolectomy and ileostomy revision and relocation.  He is very happy with the stoma.  He is not having any particular problems with.  We discussed the small area of skin irritation inferior to the stoma.  This is how he is cutting the ring to accommodate the size of the stoma.  He is been giving it a little bit extra room because it tends to retract and bulge out.  I want him to try to get a better seal around the edge however, and think he will do best with a moldable ring seal.  He has some of these at home but will contact the supply company for more.  I should be getting a order from them at some point.  In the meantime, he will continue with his Stelara injections with Dr. Carlson.  I will plan to see him on a yearly basis to check the ileostomy unless he has a problem in the meantime.

## 2020-07-20 NOTE — PROGRESS NOTES
Colorectal Postoperative Follow-up     Layton Ritchie Jr. returns after his recent procedure.    On 3/11/2020, he had a robotic proctocolectomy with ileostomy revision.  He is now about 4 months postop.  We initially had a postoperative visit at the end of March as a telemedicine encounter due to the COVID pandemic.    Subjective: He is doing very well.  He is changing his ileostomy appliance every 4 to 5 days.  He is not having any problems with leakage and not having any peristomal pain.  He has not had any drainage from the anus.  His energy level is back to normal.    He just found out that he finally got approved for medical disability.    Exam:   Physical Exam   Cardiovascular: Normal rate and regular rhythm.   Pulmonary/Chest: Breath sounds normal.   Abdominal: Soft. He exhibits no distension and no mass. There is no tenderness.   There is a small bulge around the ileostomy consistent with a small peristomal hernia.  The skin looks good except for some slight erythema inferior to the stoma running from 3:00 to 6 o'clock position.  This rim is about 5 mm.  The ileostomy itself is quite healthy.  The colostomy site in left upper quadrant has healed beautifully as has his other robotic scars.   Genitourinary:   Genitourinary Comments: There is no drainage from the anus which otherwise looks well-healed.       Hospital: [facility]  Medical Record Number:  [id number]  Patient Name:  Layton Ritchie Jr.  YOB: 1960   Date of Operation:  3/11/2020     Procedures:    * PROCTOCOLECTOMY LAPAROSCOPIC ROBOTIC     Closure Colostomy, Ileostomy relocation.     Omental pedicle flap     Pre-op Diagnosis     * Crohn's disease of colon with complication (CMS/HCC) [K50.119]       Post-op Diagnosis     * Crohn's disease of colon with complication (CMS/HCC) [K50.119]     Surgeon(s) and Role:     * James Hernandez MD - Primary     * Angel Melendez DO - Resident - Assisting     Anesthesia:  General     Staff:   Circulator: Ned Cuellar, GLENYS; Leeroy Contreras, RN  Scrub Person: Angel Andrade; Leeroy Contreras, RN; Monty Sutton      Clinical History: This 59-year-old gentleman had severe Crohn's colitis and developed megacolon requiring diverting loop ileostomy and blowhole colostomy.  The hope was that he would get on appropriate biologic therapy and then get healing of his colon to prevent permanent ileostomy.  He continues to have endoscopic evidence of severe left-sided colitis with anal ulceration and stenosis.  He is also quite bothered by severe prolapse of his transverse loop colostomy.  He also is having problems with the placement of his ileostomy in relationship to where he wears his close.  He now presents for total proctocolectomy, repair of the transverse colon prolapse, and relocation of his ileostomy.     Findings: Severe thickening and chronic inflammation in the left colon with ulceration in the anal canal and severe colitis of the distal rectum visible.     Procedure Details   The patient was placed in a lithotomy position the operating table after the induction of general anesthesia.  The rectum was irrigated with warm saline solution and then Betadine.  The perineum was prepped with Betadine.  The abdomen was prepped with ChloraPrep except the ileostomy which was prepped with Betadine and the abdomen and perineum was draped sterilely.     A circumferential incision was made at the transverse loop colostomy and dissection was taken down into the peritoneal cavity  the colon from the muscle and fascia.  The colon was delivered into the wound.  The opening of the colostomy was then temporarily closed with a running suture of 2-0 Vicryl and the colon was reduced in the abdomen.  A wound protector was placed at the site which was then used for insufflation via a robotic port.  The additional 8 mm ports were then placed in a horizontal direction extending towards the right  ASIS, and air seal port was placed in the right upper quadrant.  A 5 mm port was placed in the left mid abdomen.  Later, an additional assistant port was placed in the mid abdomen.     Laparoscopically, the splenic flexure was mobilized by incising the retroperitoneum at the level of the inferior mesenteric vein and doing a medial to lateral dissection detaching the mesentery from the inferior border the pancreas and entering the lesser sac.  The lateral attachments of the descending colon and the transverse colon were all mobilized.  Inferior mesenteric vein was divided.  The omentum was fully divided off of the descending and transverse colon all the way to the hepatic flexure.  An omental pedicle flap was performed by dividing the omentum and the proximal transverse colon and detaching the omentum from the greater curvature of the stomach.  This was later used to incorporate the anal closure.     The rest of the transverse colon was mobilized and the colic vessels were divided with the harmonic scalpel.  The lateral attachments of the right colon were fully mobilized.     At this point the ileostomy itself was taken down in the same fashion as the colostomy.  The skin was incised circumferentially and the bowel was dissected free from the subcutaneous tissue, muscle, and fascia.  The ileum was delivered into the wound.  The ileum was un-everted and then the ileostomy itself was resected with 2 firings of the CATHI 55 stapler.  The mesentery to the terminal ileum was then controlled in order to mobilize for an end ileostomy.  Using harmonic scalpel, the remaining mesentery to the right colon was divided and the ileal mesentery  from the ileum completely.     The patient was placed in a steep Trendelenburg position and the robot was docked.  The peritoneum was incised along the left colon and the presacral space was entered.  The inferior mesenteric artery was isolated and the hypogastric nerves were  preserved.  The AGUSTO was then divided with harmonic scalpel.  A medial to lateral dissection was then performed and dissection was taken down into the pelvis.  The lateral attachments of the sigmoid and descending colon were divided.  The colon was then mobilized circumferentially keeping close into the rectal wall leaving behind a portion of the mesorectum.  Dissection was taken in close to the rectum laterally to avoid the nerves along the pelvic sidewall.  Anteriorly, dissection was again performed close to the rectal wall posterior to Denonvilliers' fascia.  Section was taken down to the anal canal circumferentially.     The anus was then everted with heavy sutures and the dentate line was identified.  There was significant ulceration in the anal canal, particularly on the right side extending down distal to the dentate line.  An intersphincteric dissection was then performed circumferentially and the pelvic dissection was entered.  The anus was detached completely and then the specimen was oversewn with a heavy Vicryl suture.  Hemostasis was confirmed in the pelvis.  The omental flap was then brought down into the pelvis down to the anal opening.  The levator musculature was closed with interrupted 2-0 Vicryl suture, incorporating the distal portion of the pedicled flap.  The more distal external sphincter was then approximated with interrupted 2-0 Vicryl and then the dentate line was approximated with interrupted 3-0 Vicryl.     The colon had been completely detached and was removed through the left upper quadrant colostomy site.  The small bowel was oriented properly and the ileum was grasped.  A trephine was then created in the right rectus muscle with the previously chosen location and the ileum brought out for an end ileostomy.  The cut edge of mesentery was sutured to the abdominal wall with running 0 chromic extending up to the falciform ligament.  The abdomen was deflated.  Both stoma sites had some  amount of parastomal hernia and the hernia sacs were resected.  The fascia was freed up on both sides.  Fascia at both sites were then closed vertically with interrupted figure-of-eight sutures of 1 PDS.  Interrupted subcuticular sutures of 3-0 Vicryl were used at the ileostomy site to reapproximate the superior portion of the wound where the stoma appliance would be covering.  The inferior aspect was left open for drainage and the wound was packed.  The colostomy site was narrowed with a pursestring suture of Vicryl and then the wound packed.  All of the port sites were closed with interrupted 4-0 Vicryl subcuticular sutures and Dermabond.  Dermabond was also used to seal the upper portion of the ileostomy site.  The staple line of the ileum was then divided and the ileum matured to the dermis circumferentially in a standard everting fashion with 3-0 chromic.  A single seromuscular suture was placed inferiorly in order to secure the ileum.  An appliance was placed.     The patient was extubated and taken to the recovery room in stable condition.  There were no intraoperative complications.  The sponges were counts are correct.  I was present and scrubbed for entire procedure.    Final Diagnosis   Date Value Ref Range Status   03/11/2020   Final    A.  Ileostomy:    Fragment of skin and glandular mucosa lined tissue with moderate chronic inflammation.  There is no evidence of malignancy.    B.  TOTAL PROCTOCOLECTOMY:    Segment of small bowel with acute serositis  Transverse colon, descending colon, sigmoid colon, rectum with crypt abscesses, crypt architectural distortion, lymphoid hyperplasia, inflammatory pseudopolyps, mucosal ulceration, transmural inflammation consistent with inflammatory bowel disease.  There is no evidence of malignancy or high-grade dysplasia.  Appendix with fibrofatty luminal obliteration.  25 regional lymph nodes with no pathologic change.               Assessment / Plan    Crohn's disease  of colon with complication (CMS/HCC)  Ed has completely recovered from his proctocolectomy and ileostomy revision and relocation.  He is very happy with the stoma.  He is not having any particular problems with.  We discussed the small area of skin irritation inferior to the stoma.  This is how he is cutting the ring to accommodate the size of the stoma.  He is been giving it a little bit extra room because it tends to retract and bulge out.  I want him to try to get a better seal around the edge however, and think he will do best with a moldable ring seal.  He has some of these at home but will contact the supply company for more.  I should be getting a order from them at some point.  In the meantime, he will continue with his Stelara injections with Dr. Carlson.  I will plan to see him on a yearly basis to check the ileostomy unless he has a problem in the meantime.

## 2020-07-20 NOTE — LETTER
Dear Kristofer,    I saw Layton Ritchie Jr. in the office today in follow-up. Please see my note below.    If you have any additional questions, please do not hesitate to call me.    Sincerely,    Mk Hernandez MD      _____________________________________      Colorectal Postoperative Follow-up     Layton Ritchie Jr. returns after his recent procedure.    On 3/11/2020, he had a robotic proctocolectomy with ileostomy revision.  He is now about 4 months postop.  We initially had a postoperative visit at the end of March as a telemedicine encounter due to the COVID pandemic.    Subjective: He is doing very well.  He is changing his ileostomy appliance every 4 to 5 days.  He is not having any problems with leakage and not having any peristomal pain.  He has not had any drainage from the anus.  His energy level is back to normal.    He just found out that he finally got approved for medical disability.    Exam:   Physical Exam   Cardiovascular: Normal rate and regular rhythm.   Pulmonary/Chest: Breath sounds normal.   Abdominal: Soft. He exhibits no distension and no mass. There is no tenderness.   There is a small bulge around the ileostomy consistent with a small peristomal hernia.  The skin looks good except for some slight erythema inferior to the stoma running from 3:00 to 6 o'clock position.  This rim is about 5 mm.  The ileostomy itself is quite healthy.  The colostomy site in left upper quadrant has healed beautifully as has his other robotic scars.   Genitourinary:   Genitourinary Comments: There is no drainage from the anus which otherwise looks well-healed.       Hospital: [facility]  Medical Record Number:  [id number]  Patient Name:  Layton Ritchie Jr.  YOB: 1960   Date of Operation:  3/11/2020     Procedures:    * PROCTOCOLECTOMY LAPAROSCOPIC ROBOTIC     Closure Colostomy, Ileostomy relocation.     Omental pedicle flap     Pre-op Diagnosis     * Crohn's disease of colon  with complication (CMS/HCC) [K50.119]       Post-op Diagnosis     * Crohn's disease of colon with complication (CMS/HCC) [K50.119]     Surgeon(s) and Role:     * James Hernandez MD - Primary     * Angel Melendez DO - Resident - Assisting     Anesthesia: General     Staff:   Circulator: Ned Cuellar RN; Leeroy Contreras, RN  Scrub Person: Angel Andrade; Leeroy Contreras, GLENYS; Monty Sutton      Clinical History: This 59-year-old gentleman had severe Crohn's colitis and developed megacolon requiring diverting loop ileostomy and blowhole colostomy.  The hope was that he would get on appropriate biologic therapy and then get healing of his colon to prevent permanent ileostomy.  He continues to have endoscopic evidence of severe left-sided colitis with anal ulceration and stenosis.  He is also quite bothered by severe prolapse of his transverse loop colostomy.  He also is having problems with the placement of his ileostomy in relationship to where he wears his close.  He now presents for total proctocolectomy, repair of the transverse colon prolapse, and relocation of his ileostomy.     Findings: Severe thickening and chronic inflammation in the left colon with ulceration in the anal canal and severe colitis of the distal rectum visible.     Procedure Details   The patient was placed in a lithotomy position the operating table after the induction of general anesthesia.  The rectum was irrigated with warm saline solution and then Betadine.  The perineum was prepped with Betadine.  The abdomen was prepped with ChloraPrep except the ileostomy which was prepped with Betadine and the abdomen and perineum was draped sterilely.     A circumferential incision was made at the transverse loop colostomy and dissection was taken down into the peritoneal cavity  the colon from the muscle and fascia.  The colon was delivered into the wound.  The opening of the colostomy was then temporarily closed with a running  suture of 2-0 Vicryl and the colon was reduced in the abdomen.  A wound protector was placed at the site which was then used for insufflation via a robotic port.  The additional 8 mm ports were then placed in a horizontal direction extending towards the right ASIS, and air seal port was placed in the right upper quadrant.  A 5 mm port was placed in the left mid abdomen.  Later, an additional assistant port was placed in the mid abdomen.     Laparoscopically, the splenic flexure was mobilized by incising the retroperitoneum at the level of the inferior mesenteric vein and doing a medial to lateral dissection detaching the mesentery from the inferior border the pancreas and entering the lesser sac.  The lateral attachments of the descending colon and the transverse colon were all mobilized.  Inferior mesenteric vein was divided.  The omentum was fully divided off of the descending and transverse colon all the way to the hepatic flexure.  An omental pedicle flap was performed by dividing the omentum and the proximal transverse colon and detaching the omentum from the greater curvature of the stomach.  This was later used to incorporate the anal closure.     The rest of the transverse colon was mobilized and the colic vessels were divided with the harmonic scalpel.  The lateral attachments of the right colon were fully mobilized.     At this point the ileostomy itself was taken down in the same fashion as the colostomy.  The skin was incised circumferentially and the bowel was dissected free from the subcutaneous tissue, muscle, and fascia.  The ileum was delivered into the wound.  The ileum was un-everted and then the ileostomy itself was resected with 2 firings of the CATHI 55 stapler.  The mesentery to the terminal ileum was then controlled in order to mobilize for an end ileostomy.  Using harmonic scalpel, the remaining mesentery to the right colon was divided and the ileal mesentery  from the ileum  completely.     The patient was placed in a steep Trendelenburg position and the robot was docked.  The peritoneum was incised along the left colon and the presacral space was entered.  The inferior mesenteric artery was isolated and the hypogastric nerves were preserved.  The AGUSTO was then divided with harmonic scalpel.  A medial to lateral dissection was then performed and dissection was taken down into the pelvis.  The lateral attachments of the sigmoid and descending colon were divided.  The colon was then mobilized circumferentially keeping close into the rectal wall leaving behind a portion of the mesorectum.  Dissection was taken in close to the rectum laterally to avoid the nerves along the pelvic sidewall.  Anteriorly, dissection was again performed close to the rectal wall posterior to Denonvilliers' fascia.  Section was taken down to the anal canal circumferentially.     The anus was then everted with heavy sutures and the dentate line was identified.  There was significant ulceration in the anal canal, particularly on the right side extending down distal to the dentate line.  An intersphincteric dissection was then performed circumferentially and the pelvic dissection was entered.  The anus was detached completely and then the specimen was oversewn with a heavy Vicryl suture.  Hemostasis was confirmed in the pelvis.  The omental flap was then brought down into the pelvis down to the anal opening.  The levator musculature was closed with interrupted 2-0 Vicryl suture, incorporating the distal portion of the pedicled flap.  The more distal external sphincter was then approximated with interrupted 2-0 Vicryl and then the dentate line was approximated with interrupted 3-0 Vicryl.     The colon had been completely detached and was removed through the left upper quadrant colostomy site.  The small bowel was oriented properly and the ileum was grasped.  A trephine was then created in the right rectus muscle with  the previously chosen location and the ileum brought out for an end ileostomy.  The cut edge of mesentery was sutured to the abdominal wall with running 0 chromic extending up to the falciform ligament.  The abdomen was deflated.  Both stoma sites had some amount of parastomal hernia and the hernia sacs were resected.  The fascia was freed up on both sides.  Fascia at both sites were then closed vertically with interrupted figure-of-eight sutures of 1 PDS.  Interrupted subcuticular sutures of 3-0 Vicryl were used at the ileostomy site to reapproximate the superior portion of the wound where the stoma appliance would be covering.  The inferior aspect was left open for drainage and the wound was packed.  The colostomy site was narrowed with a pursestring suture of Vicryl and then the wound packed.  All of the port sites were closed with interrupted 4-0 Vicryl subcuticular sutures and Dermabond.  Dermabond was also used to seal the upper portion of the ileostomy site.  The staple line of the ileum was then divided and the ileum matured to the dermis circumferentially in a standard everting fashion with 3-0 chromic.  A single seromuscular suture was placed inferiorly in order to secure the ileum.  An appliance was placed.     The patient was extubated and taken to the recovery room in stable condition.  There were no intraoperative complications.  The sponges were counts are correct.  I was present and scrubbed for entire procedure.    Final Diagnosis   Date Value Ref Range Status   03/11/2020   Final    A.  Ileostomy:    Fragment of skin and glandular mucosa lined tissue with moderate chronic inflammation.  There is no evidence of malignancy.    B.  TOTAL PROCTOCOLECTOMY:    Segment of small bowel with acute serositis  Transverse colon, descending colon, sigmoid colon, rectum with crypt abscesses, crypt architectural distortion, lymphoid hyperplasia, inflammatory pseudopolyps, mucosal ulceration, transmural  inflammation consistent with inflammatory bowel disease.  There is no evidence of malignancy or high-grade dysplasia.  Appendix with fibrofatty luminal obliteration.  25 regional lymph nodes with no pathologic change.               Assessment / Plan    Crohn's disease of colon with complication (CMS/HCC)  Ed has completely recovered from his proctocolectomy and ileostomy revision and relocation.  He is very happy with the stoma.  He is not having any particular problems with.  We discussed the small area of skin irritation inferior to the stoma.  This is how he is cutting the ring to accommodate the size of the stoma.  He is been giving it a little bit extra room because it tends to retract and bulge out.  I want him to try to get a better seal around the edge however, and think he will do best with a moldable ring seal.  He has some of these at home but will contact the supply company for more.  I should be getting a order from them at some point.  In the meantime, he will continue with his Stelara injections with Dr. Carlson.  I will plan to see him on a yearly basis to check the ileostomy unless he has a problem in the meantime.

## 2020-07-31 ENCOUNTER — TRANSCRIBE ORDERS (OUTPATIENT)
Dept: SCHEDULING | Age: 60
End: 2020-07-31

## 2020-07-31 DIAGNOSIS — K50.90 CROHN'S DISEASE, UNSPECIFIED, WITHOUT COMPLICATIONS (CMS/HCC): Primary | ICD-10-CM

## 2020-08-13 ENCOUNTER — HOSPITAL ENCOUNTER (OUTPATIENT)
Dept: RADIOLOGY | Facility: HOSPITAL | Age: 60
Discharge: HOME | End: 2020-08-13
Attending: INTERNAL MEDICINE
Payer: COMMERCIAL

## 2020-08-13 DIAGNOSIS — K50.90 CROHN'S DISEASE, UNSPECIFIED, WITHOUT COMPLICATIONS (CMS/HCC): ICD-10-CM

## 2020-08-13 PROCEDURE — A9585 GADOBUTROL INJECTION: HCPCS | Performed by: INTERNAL MEDICINE

## 2020-08-13 PROCEDURE — A9579 GAD-BASE MR CONTRAST NOS,1ML: HCPCS | Performed by: INTERNAL MEDICINE

## 2020-08-13 PROCEDURE — 74183 MRI ABD W/O CNTR FLWD CNTR: CPT

## 2020-08-13 RX ORDER — GADOBUTROL 604.72 MG/ML
0.1 INJECTION INTRAVENOUS ONCE
Status: COMPLETED | OUTPATIENT
Start: 2020-08-13 | End: 2020-08-13

## 2020-08-13 RX ADMIN — GADOBUTROL 9 ML: 604.72 INJECTION INTRAVENOUS at 08:15

## 2021-12-09 ENCOUNTER — TELEPHONE (OUTPATIENT)
Dept: SURGERY | Facility: CLINIC | Age: 61
End: 2021-12-09
Payer: COMMERCIAL

## 2021-12-09 NOTE — TELEPHONE ENCOUNTER
Patient's ostomy supplier [employer]  Contacted our office for confirmation patient still under our care.

## 2022-10-13 ENCOUNTER — APPOINTMENT (OUTPATIENT)
Dept: URBAN - METROPOLITAN AREA CLINIC 193 | Age: 62
Setting detail: DERMATOLOGY
End: 2022-10-13

## 2022-10-13 DIAGNOSIS — L57.8 OTHER SKIN CHANGES DUE TO CHRONIC EXPOSURE TO NONIONIZING RADIATION: ICD-10-CM

## 2022-10-13 DIAGNOSIS — L81.4 OTHER MELANIN HYPERPIGMENTATION: ICD-10-CM

## 2022-10-13 DIAGNOSIS — L82.1 OTHER SEBORRHEIC KERATOSIS: ICD-10-CM

## 2022-10-13 DIAGNOSIS — Z85.828 PERSONAL HISTORY OF OTHER MALIGNANT NEOPLASM OF SKIN: ICD-10-CM

## 2022-10-13 DIAGNOSIS — L57.0 ACTINIC KERATOSIS: ICD-10-CM

## 2022-10-13 DIAGNOSIS — D485 NEOPLASM OF UNCERTAIN BEHAVIOR OF SKIN: ICD-10-CM

## 2022-10-13 DIAGNOSIS — D22 MELANOCYTIC NEVI: ICD-10-CM

## 2022-10-13 DIAGNOSIS — Z85.820 PERSONAL HISTORY OF MALIGNANT MELANOMA OF SKIN: ICD-10-CM

## 2022-10-13 PROBLEM — D22.5 MELANOCYTIC NEVI OF TRUNK: Status: ACTIVE | Noted: 2022-10-13

## 2022-10-13 PROBLEM — D22.72 MELANOCYTIC NEVI OF LEFT LOWER LIMB, INCLUDING HIP: Status: ACTIVE | Noted: 2022-10-13

## 2022-10-13 PROBLEM — D22.61 MELANOCYTIC NEVI OF RIGHT UPPER LIMB, INCLUDING SHOULDER: Status: ACTIVE | Noted: 2022-10-13

## 2022-10-13 PROBLEM — D22.39 MELANOCYTIC NEVI OF OTHER PARTS OF FACE: Status: ACTIVE | Noted: 2022-10-13

## 2022-10-13 PROBLEM — D22.71 MELANOCYTIC NEVI OF RIGHT LOWER LIMB, INCLUDING HIP: Status: ACTIVE | Noted: 2022-10-13

## 2022-10-13 PROBLEM — D48.5 NEOPLASM OF UNCERTAIN BEHAVIOR OF SKIN: Status: ACTIVE | Noted: 2022-10-13

## 2022-10-13 PROBLEM — D22.62 MELANOCYTIC NEVI OF LEFT UPPER LIMB, INCLUDING SHOULDER: Status: ACTIVE | Noted: 2022-10-13

## 2022-10-13 PROCEDURE — 11103 TANGNTL BX SKIN EA SEP/ADDL: CPT

## 2022-10-13 PROCEDURE — OTHER COUNSELING: OTHER

## 2022-10-13 PROCEDURE — 99213 OFFICE O/P EST LOW 20 MIN: CPT | Mod: 25

## 2022-10-13 PROCEDURE — 11102 TANGNTL BX SKIN SINGLE LES: CPT

## 2022-10-13 PROCEDURE — OTHER BIOPSY BY SHAVE METHOD: OTHER

## 2022-10-13 ASSESSMENT — LOCATION DETAILED DESCRIPTION DERM
LOCATION DETAILED: RIGHT PROXIMAL PRETIBIAL REGION
LOCATION DETAILED: NASAL SUPRATIP
LOCATION DETAILED: RIGHT CLAVICULAR SKIN
LOCATION DETAILED: RIGHT CLAVICULAR NECK
LOCATION DETAILED: LEFT INFERIOR FOREHEAD
LOCATION DETAILED: LEFT ANTERIOR SHOULDER
LOCATION DETAILED: RIGHT ANTERIOR SHOULDER
LOCATION DETAILED: PERIUMBILICAL SKIN
LOCATION DETAILED: RIGHT ANTERIOR DISTAL THIGH
LOCATION DETAILED: RIGHT CENTRAL MALAR CHEEK
LOCATION DETAILED: LEFT NASAL ALA
LOCATION DETAILED: LEFT MID TEMPLE
LOCATION DETAILED: LEFT ANTERIOR DISTAL THIGH

## 2022-10-13 ASSESSMENT — LOCATION SIMPLE DESCRIPTION DERM
LOCATION SIMPLE: LEFT NOSE
LOCATION SIMPLE: LEFT FOREHEAD
LOCATION SIMPLE: RIGHT ANTERIOR NECK
LOCATION SIMPLE: RIGHT CHEEK
LOCATION SIMPLE: LEFT SHOULDER
LOCATION SIMPLE: LEFT THIGH
LOCATION SIMPLE: RIGHT CLAVICULAR SKIN
LOCATION SIMPLE: RIGHT SHOULDER
LOCATION SIMPLE: LEFT TEMPLE
LOCATION SIMPLE: ABDOMEN
LOCATION SIMPLE: RIGHT THIGH
LOCATION SIMPLE: RIGHT PRETIBIAL REGION
LOCATION SIMPLE: NOSE

## 2022-10-13 ASSESSMENT — LOCATION ZONE DERM
LOCATION ZONE: NOSE
LOCATION ZONE: ARM
LOCATION ZONE: LEG
LOCATION ZONE: TRUNK
LOCATION ZONE: FACE
LOCATION ZONE: NECK

## 2022-10-13 NOTE — PROCEDURE: BIOPSY BY SHAVE METHOD
Detail Level: Detailed
Depth Of Biopsy: dermis
Was A Bandage Applied: Yes
Size Of Lesion In Cm: 1.5
X Size Of Lesion In Cm: 0
Biopsy Type: H and E
Biopsy Method: Dermablade
Anesthesia Type: 0.5% lidocaine with 1:200,000 epinephrine and a 1:10 solution of 8.4% sodium bicarbonate
Anesthesia Volume In Cc (Will Not Render If 0): 0.5
Hemostasis: Drysol
Wound Care: Petrolatum
Dressing: bandage
Destruction After The Procedure: No
Type Of Destruction Used: Curettage
Curettage Text: The wound bed was treated with curettage after the biopsy was performed.
Cryotherapy Text: The wound bed was treated with cryotherapy after the biopsy was performed.
Electrodesiccation Text: The wound bed was treated with electrodesiccation after the biopsy was performed.
Electrodesiccation And Curettage Text: The wound bed was treated with electrodesiccation and curettage after the biopsy was performed.
Silver Nitrate Text: The wound bed was treated with silver nitrate after the biopsy was performed.
Lab: [id number]
Consent: Written consent was obtained and risks were reviewed including but not limited to scarring, infection, bleeding, scabbing, incomplete removal, nerve damage and allergy to anesthesia.
Post-Care Instructions: I reviewed with the patient in detail post-care instructions. Patient is to keep the biopsy site dry overnight, and then apply bacitracin twice daily until healed. Patient may apply hydrogen peroxide soaks to remove any crusting.
Notification Instructions: Patient will be notified of biopsy results. However, patient instructed to call the office if not contacted within 2 weeks.
Billing Type: Third-Party Bill
Information: Selecting Yes will display possible errors in your note based on the variables you have selected. This validation is only offered as a suggestion for you. PLEASE NOTE THAT THE VALIDATION TEXT WILL BE REMOVED WHEN YOU FINALIZE YOUR NOTE. IF YOU WANT TO FAX A PRELIMINARY NOTE YOU WILL NEED TO TOGGLE THIS TO 'NO' IF YOU DO NOT WANT IT IN YOUR FAXED NOTE.
Size Of Lesion In Cm: 1.2
Size Of Lesion In Cm: 0.4

## 2022-10-13 NOTE — HPI: EVALUATION OF SKIN LESION(S)
Have Your Spot(S) Been Treated In The Past?: has not been treated
Hpi Title: Evaluation of Skin Lesions
Additional History: Patient is concerned with a spot that’s bleeding on L temple \\n\\nSpot on R clavicle bleeding

## 2022-10-13 NOTE — PROCEDURE: COUNSELING
Detail Level: Detailed
Detail Level: Generalized
Detail Level: Zone
Patient Specific Counseling (Will Not Stick From Patient To Patient): Severe widespread AKs on face, forearms, dorsal hands. Discussed need for field therapy following treatment of multiple NMSC today

## 2022-10-17 ENCOUNTER — TELEPHONE (OUTPATIENT)
Dept: SURGERY | Facility: CLINIC | Age: 62
End: 2022-10-17

## 2022-10-17 NOTE — TELEPHONE ENCOUNTER
I spoke with patient  who informed that past 3 to 4 months that stomach is getting larger. Patient states fair appetite, denies nausea/vomiting but can get dry heaves with occasional cough, no urinary issues, and that ileostomy is functioning well. After speaking with you, Dr. Hernandez, was able to move patient 's visit from 12/8 to 11/3 at 945a. Patient agreeable. I instructed patient to call service for any problems/concerns/questions. Patient with stated understanding. Take care.

## 2022-10-25 ENCOUNTER — APPOINTMENT (OUTPATIENT)
Dept: URBAN - METROPOLITAN AREA CLINIC 193 | Age: 62
Setting detail: DERMATOLOGY
End: 2022-10-25

## 2022-10-25 PROBLEM — C44.91 BASAL CELL CARCINOMA OF SKIN, UNSPECIFIED: Status: ACTIVE | Noted: 2022-10-25

## 2022-10-25 PROCEDURE — OTHER MOHS SURGERY PHONE CONSULTATION: OTHER

## 2022-10-25 NOTE — PROCEDURE: MOHS SURGERY PHONE CONSULTATION
Detail Level: Simple
Patient Reported Location: Nasal supratip - BCC INF.
Referring Provider: Dr. Mix
Does The Patient Have A Living Will (Optional)?: No
Date Of Mohs Surgery: 01/13/2023
Time Of Mohs Surgery: 9:15 am
Has The Pathology Report Been Received?: Yes
Additional Information?: Pt is fully vaccinated for Covid with booster.
Which Antibiotic Do They Take For Surgical Prophylaxis?: Amoxicillin (2 grams)
Patient Reported Location: Left mid temple
Date Of Mohs Surgery: 01/20/2023
Referring Provider: Dr. Mix
Date Of Mohs Surgery: 02/24/2023
Referring Provider: Dr. Mix
Patient Reported Location: Left nasal ala - BCC

## 2022-11-03 ENCOUNTER — OFFICE VISIT (OUTPATIENT)
Dept: SURGERY | Facility: CLINIC | Age: 62
End: 2022-11-03
Payer: MEDICARE

## 2022-11-03 VITALS
SYSTOLIC BLOOD PRESSURE: 164 MMHG | DIASTOLIC BLOOD PRESSURE: 100 MMHG | HEIGHT: 68 IN | WEIGHT: 201 LBS | BODY MASS INDEX: 30.46 KG/M2

## 2022-11-03 DIAGNOSIS — K50.119 CROHN'S DISEASE OF COLON WITH COMPLICATION (CMS/HCC): Primary | ICD-10-CM

## 2022-11-03 DIAGNOSIS — K43.5 PARASTOMAL HERNIA WITHOUT OBSTRUCTION OR GANGRENE: ICD-10-CM

## 2022-11-03 PROCEDURE — 99214 OFFICE O/P EST MOD 30 MIN: CPT | Performed by: COLON & RECTAL SURGERY

## 2022-11-03 RX ORDER — AMOXICILLIN AND CLAVULANATE POTASSIUM 875; 125 MG/1; MG/1
TABLET, FILM COATED ORAL
COMMUNITY
Start: 2022-10-28

## 2022-11-03 RX ORDER — AMLODIPINE BESYLATE 10 MG/1
1 TABLET ORAL DAILY
COMMUNITY
Start: 2022-02-18

## 2022-11-03 NOTE — LETTER
"  Dear Kristofer,    I saw Layton Ritchie Jr. in the office today in follow-up. Please see my note below.    If you have any additional questions, please do not hesitate to call me.    Sincerely,    Mk Hernandez MD      _____________________________________      Colorectal Surgery Follow-up    Subjective      Layton Ritchie Jr. is a 62 y.o. male who turns in follow-up of his end ileostomy.    In May 2019, he developed a megacolon from severe Crohn's colitis and a diverting loop ileostomy and blowhole colostomy was performed.  The hope was that with biologic therapy he would get healing of his colon and prevent permanent ileostomy.  However, he continued to have endoscopic evidence of severe left-sided colitis with anal ulceration and stenosis and he also had significant problems with prolapse of his transverse loop colostomy and on 3/11/2020 he had a robotic assisted total proctocolectomy with an omental pedicle flap in the pelvis.  He had a revision of the ileostomy to an end stoma at a new site because of hernia and problems with appliance placement at the previous loop ileostomy site.    His major issue is bulging from his ileostomy site.  The area has become fairly large and prominent and he sees it bulging through his clothes.  He also has some discomfort with where he wears his pants and belt.  He is not having any difficulties with the ileostomy itself.  He has no obstructive symptoms.  He changes his appliance every 4 to 5 days and does not have leaking problems.  He does not use any stoma paste or barrier ring.  He does not have any pain.    Medical History:   Past Medical History:   Diagnosis Date    Basal cell carcinoma (BCC) in situ of skin     right nare, left arm     Crohn's disease of colon with complication (CMS/HCC) 2012    \"Crohn's colitis with megacolon\"       Surgical History:   Past Surgical History:   Procedure Laterality Date    COLON SURGERY  05/27/2019    Colostomy/Ileostomy " "Laparoscopic    OTHER SURGICAL HISTORY Right 12/2019    TAKEDOWN PARAMEDIAN FOREHEAD FLAP    TEAR DUCT SURGERY  12/2019    repaired after basal cell skin cancer was removed from right side of nose- with flap    TONSILLECTOMY  1962       Allergies: Patient has no known allergies.    Current Medications:    amoxicillin-pot clavulanate    ustekinumab (STELARA SUBQ)    amLODIPine    Objective      Physicial Exam  Visit Vitals  BP (!) 164/100 (BP Location: Left upper arm, Patient Position: Sitting)   Ht 1.727 m (5' 8\")   Wt 91.2 kg (201 lb)   BMI 30.56 kg/m²       Physical Exam  Vitals reviewed.   Constitutional:       Appearance: Normal appearance. He is well-developed.   HENT:      Head: Normocephalic and atraumatic.   Eyes:      Pupils: Pupils are equal, round, and reactive to light.   Cardiovascular:      Rate and Rhythm: Normal rate and regular rhythm.      Heart sounds: Normal heart sounds. No murmur heard.  Pulmonary:      Effort: Pulmonary effort is normal. No respiratory distress.      Breath sounds: Normal breath sounds. No wheezing.   Abdominal:      General: There is no distension.      Palpations: Abdomen is soft. There is no mass.      Tenderness: There is no abdominal tenderness.      Hernia: No hernia is present.      Comments: The old stoma scar in the left upper quadrant as well as the right lower quadrant scar seems to be well-healed and I do not feel any obvious hernia.  The rest of his abdomen is soft and nontender.  He has a prominent peristomal hernia circumferentially around his ileostomy in the right abdomen.  The peristomal skin however is beautiful.  There is no excoriation or ulceration.  The stoma itself is quite healthy and protrudes nicely.  There is no obvious evidence of ulceration or stenosis from Crohn's disease.   Musculoskeletal:         General: No tenderness. Normal range of motion.      Cervical back: Normal range of motion and neck supple.   Lymphadenopathy:      Cervical: " No cervical adenopathy.   Skin:     General: Skin is warm and dry.      Capillary Refill: Capillary refill takes less than 2 seconds.      Findings: No rash.   Neurological:      Mental Status: He is alert and oriented to person, place, and time.      Cranial Nerves: No cranial nerve deficit.   Psychiatric:         Mood and Affect: Mood normal.         Behavior: Behavior normal.                 Assessment      Problem List Items Addressed This Visit        Digestive    Crohn's disease of colon with complication (CMS/HCC) - Primary    Current Assessment & Plan     He continues on Stelara with no active symptomatic Crohn's disease at this time.  He will continue to follow with Dr. Carlson.            Other    Parastomal hernia    Current Assessment & Plan     He is symptomatic from his peristomal hernia because of the unsightly bulging as well as interfering with how he wears his clothes.  We discussed the indications for surgical intervention which would be the inability to keep an appliance on, pain, obstructive symptoms, or skin issues.  He has none of the symptoms.  I recommend using an abdominal binder that is is designed to have an opening for the stoma appliance.  I will try to get some information for him for companies that can do a custom fit.  He also is using a very basic clear cut to fit appliance and I think he would have a lot easier time with a precut appliance that is opaque and has a built-in closure device.  He will talk to the clinical specialist at the stoma companies and get some sample supplies and see if he likes other appliances better.  I would like to continue to see him on a yearly basis for monitoring his stoma.                 James Hernandez MD

## 2022-11-03 NOTE — ASSESSMENT & PLAN NOTE
He continues on Stelara with no active symptomatic Crohn's disease at this time.  He will continue to follow with Dr. Carlson.

## 2022-11-03 NOTE — PROGRESS NOTES
"Colorectal Surgery Follow-up    Subjective     Layton Ritchie Jr. is a 62 y.o. male who turns in follow-up of his end ileostomy.    In May 2019, he developed a megacolon from severe Crohn's colitis and a diverting loop ileostomy and blowhole colostomy was performed.  The hope was that with biologic therapy he would get healing of his colon and prevent permanent ileostomy.  However, he continued to have endoscopic evidence of severe left-sided colitis with anal ulceration and stenosis and he also had significant problems with prolapse of his transverse loop colostomy and on 3/11/2020 he had a robotic assisted total proctocolectomy with an omental pedicle flap in the pelvis.  He had a revision of the ileostomy to an end stoma at a new site because of hernia and problems with appliance placement at the previous loop ileostomy site.    His major issue is bulging from his ileostomy site.  The area has become fairly large and prominent and he sees it bulging through his clothes.  He also has some discomfort with where he wears his pants and belt.  He is not having any difficulties with the ileostomy itself.  He has no obstructive symptoms.  He changes his appliance every 4 to 5 days and does not have leaking problems.  He does not use any stoma paste or barrier ring.  He does not have any pain.    Medical History:   Past Medical History:   Diagnosis Date    Basal cell carcinoma (BCC) in situ of skin     right nare, left arm     Crohn's disease of colon with complication (CMS/[facility]) 2012    \"Crohn's colitis with megacolon\"       Surgical History:   Past Surgical History:   Procedure Laterality Date    COLON SURGERY  05/27/2019    Colostomy/Ileostomy Laparoscopic    OTHER SURGICAL HISTORY Right 12/2019    TAKEDOWN PARAMEDIAN FOREHEAD FLAP    TEAR DUCT SURGERY  12/2019    repaired after basal cell skin cancer was removed from right side of nose- with flap    TONSILLECTOMY  1962       Allergies: Patient has no known " "allergies.    Current Medications:    amoxicillin-pot clavulanate    ustekinumab (STELARA SUBQ)    amLODIPine    Objective     Physicial Exam  Visit Vitals  BP (!) 164/100 (BP Location: Left upper arm, Patient Position: Sitting)   Ht 1.727 m (5' 8\")   Wt 91.2 kg (201 lb)   BMI 30.56 kg/m²       Physical Exam  Vitals reviewed.   Constitutional:       Appearance: Normal appearance. He is well-developed.   HENT:      Head: Normocephalic and atraumatic.   Eyes:      Pupils: Pupils are equal, round, and reactive to light.   Cardiovascular:      Rate and Rhythm: Normal rate and regular rhythm.      Heart sounds: Normal heart sounds. No murmur heard.  Pulmonary:      Effort: Pulmonary effort is normal. No respiratory distress.      Breath sounds: Normal breath sounds. No wheezing.   Abdominal:      General: There is no distension.      Palpations: Abdomen is soft. There is no mass.      Tenderness: There is no abdominal tenderness.      Hernia: No hernia is present.      Comments: The old stoma scar in the left upper quadrant as well as the right lower quadrant scar seems to be well-healed and I do not feel any obvious hernia.  The rest of his abdomen is soft and nontender.  He has a prominent peristomal hernia circumferentially around his ileostomy in the right abdomen.  The peristomal skin however is beautiful.  There is no excoriation or ulceration.  The stoma itself is quite healthy and protrudes nicely.  There is no obvious evidence of ulceration or stenosis from Crohn's disease.   Musculoskeletal:         General: No tenderness. Normal range of motion.      Cervical back: Normal range of motion and neck supple.   Lymphadenopathy:      Cervical: No cervical adenopathy.   Skin:     General: Skin is warm and dry.      Capillary Refill: Capillary refill takes less than 2 seconds.      Findings: No rash.   Neurological:      Mental Status: He is alert and oriented to person, place, and time.      Cranial Nerves: No " cranial nerve deficit.   Psychiatric:         Mood and Affect: Mood normal.         Behavior: Behavior normal.                 Assessment     Problem List Items Addressed This Visit        Digestive    Crohn's disease of colon with complication (CMS/HCC) - Primary    Current Assessment & Plan     He continues on Stelara with no active symptomatic Crohn's disease at this time.  He will continue to follow with Dr. Carlson.            Other    Parastomal hernia    Current Assessment & Plan     He is symptomatic from his peristomal hernia because of the unsightly bulging as well as interfering with how he wears his clothes.  We discussed the indications for surgical intervention which would be the inability to keep an appliance on, pain, obstructive symptoms, or skin issues.  He has none of the symptoms.  I recommend using an abdominal binder that is is designed to have an opening for the stoma appliance.  I will try to get some information for him for companies that can do a custom fit.  He also is using a very basic clear cut to fit appliance and I think he would have a lot easier time with a precut appliance that is opaque and has a built-in closure device.  He will talk to the clinical specialist at the stoma companies and get some sample supplies and see if he likes other appliances better.  I would like to continue to see him on a yearly basis for monitoring his stoma.                  James Hernandez MD

## 2022-11-03 NOTE — ASSESSMENT & PLAN NOTE
He is symptomatic from his peristomal hernia because of the unsightly bulging as well as interfering with how he wears his clothes.  We discussed the indications for surgical intervention which would be the inability to keep an appliance on, pain, obstructive symptoms, or skin issues.  He has none of the symptoms.  I recommend using an abdominal binder that is is designed to have an opening for the stoma appliance.  I will try to get some information for him for companies that can do a custom fit.  He also is using a very basic clear cut to fit appliance and I think he would have a lot easier time with a precut appliance that is opaque and has a built-in closure device.  He will talk to the clinical specialist at the stoma companies and get some sample supplies and see if he likes other appliances better.  I would like to continue to see him on a yearly basis for monitoring his stoma.

## 2022-11-09 ENCOUNTER — APPOINTMENT (OUTPATIENT)
Dept: URBAN - METROPOLITAN AREA CLINIC 193 | Age: 62
Setting detail: DERMATOLOGY
End: 2022-11-09

## 2022-11-09 PROBLEM — C44.42 SQUAMOUS CELL CARCINOMA OF SKIN OF SCALP AND NECK: Status: ACTIVE | Noted: 2022-11-09

## 2022-11-09 PROCEDURE — 12042 INTMD RPR N-HF/GENIT2.6-7.5: CPT

## 2022-11-09 PROCEDURE — OTHER EXCISION: OTHER

## 2022-11-09 PROCEDURE — OTHER COUNSELING: OTHER

## 2022-11-09 PROCEDURE — 11622 EXC S/N/H/F/G MAL+MRG 1.1-2: CPT

## 2022-11-09 NOTE — PROCEDURE: EXCISION
97 Bilateral Helical Rim Advancement Flap Text: The defect edges were debeveled with a #15 blade scalpel.  Given the location of the defect and the proximity to free margins (helical rim) a bilateral helical rim advancement flap was deemed most appropriate.  Using a sterile surgical marker, the appropriate advancement flaps were drawn incorporating the defect and placing the expected incisions between the helical rim and antihelix where possible.  The area thus outlined was incised through and through with a #15 scalpel blade.  With a skin hook and iris scissors, the flaps were gently and sharply undermined and freed up.

## 2022-11-09 NOTE — PROCEDURE: EXCISION
----- Message from April Ayla sent at 3/22/2022 10:16 AM EDT -----  Subject: Message to Provider    QUESTIONS  Information for Provider? Patient would like to get the shingles   vaccination. Please call patient to advise when she can come in for this,   thank you   ---------------------------------------------------------------------------  --------------  CALL BACK INFO  What is the best way for the office to contact you? OK to leave message on   voicemail  Preferred Call Back Phone Number? [phone]  ---------------------------------------------------------------------------  --------------  SCRIPT ANSWERS  Relationship to Patient?  Self Island Pedicle Flap-Requiring Vessel Identification Text: The defect edges were debeveled with a #15 scalpel blade.  Given the location of the defect, shape of the defect and the proximity to free margins an island pedicle advancement flap was deemed most appropriate.  Using a sterile surgical marker, an appropriate advancement flap was drawn, based on the axial vessel mentioned above, incorporating the defect, outlining the appropriate donor tissue and placing the expected incisions within the relaxed skin tension lines where possible.    The area thus outlined was incised deep to adipose tissue with a #15 scalpel blade.  The skin margins were undermined to an appropriate distance in all directions around the primary defect and laterally outward around the island pedicle utilizing iris scissors.  There was minimal undermining beneath the pedicle flap.

## 2022-11-09 NOTE — PROCEDURE: EXCISION
Path Notes (To The Dermatopathologist): Please check margins.\\nAccession# [id number]\\nSuperior kinsey Path Notes (To The Dermatopathologist): Please check margins.\\nAccession# [id number]\\nSuperior kinsey

## 2022-11-09 NOTE — PROCEDURE: EXCISION
(3) walks occasionally Cheek-To-Nose Interpolation Flap Text: A decision was made to reconstruct the defect utilizing an interpolation axial flap and a staged reconstruction.  A telfa template was made of the defect.  This telfa template was then used to outline the Cheek-To-Nose Interpolation flap.  The donor area for the pedicle flap was then injected with anesthesia.  The flap was excised through the skin and subcutaneous tissue down to the layer of the underlying musculature.  The interpolation flap was carefully excised within this deep plane to maintain its blood supply.  The edges of the donor site were undermined.   The donor site was closed in a primary fashion.  The pedicle was then rotated into position and sutured.  Once the tube was sutured into place, adequate blood supply was confirmed with blanching and refill.  The pedicle was then wrapped with xeroform gauze and dressed appropriately with a telfa and gauze bandage to ensure continued blood supply and protect the attached pedicle.

## 2022-11-09 NOTE — PROCEDURE: EXCISION
"Chief Complaint   Patient presents with     Depression     Anxiety       Initial /79   Pulse 64   Temp 98.3  F (36.8  C) (Oral)   Wt 83.5 kg (184 lb)   SpO2 98%   BMI 26.78 kg/m   Estimated body mass index is 26.78 kg/m  as calculated from the following:    Height as of 1/2/19: 1.765 m (5' 9.5\").    Weight as of this encounter: 83.5 kg (184 lb).  Medication Reconciliation: complete    SARAH Camacho MA    " Banner Transposition Flap Text: The defect edges were debeveled with a #15 scalpel blade.  Given the location of the defect and the proximity to free margins a Banner transposition flap was deemed most appropriate.  Using a sterile surgical marker, an appropriate flap drawn around the defect. The area thus outlined was incised deep to adipose tissue with a #15 scalpel blade.  The skin margins were undermined to an appropriate distance in all directions utilizing iris scissors.

## 2022-11-22 ENCOUNTER — APPOINTMENT (OUTPATIENT)
Dept: URBAN - METROPOLITAN AREA CLINIC 193 | Age: 62
Setting detail: DERMATOLOGY
End: 2022-11-22

## 2022-11-22 PROBLEM — C44.42 SQUAMOUS CELL CARCINOMA OF SKIN OF SCALP AND NECK: Status: ACTIVE | Noted: 2022-11-22

## 2022-11-22 PROCEDURE — OTHER COUNSELING: OTHER

## 2022-11-22 PROCEDURE — OTHER SUTURE REMOVAL (GLOBAL PERIOD): OTHER

## 2022-11-22 PROCEDURE — 99024 POSTOP FOLLOW-UP VISIT: CPT

## 2022-11-22 NOTE — PROCEDURE: SUTURE REMOVAL (GLOBAL PERIOD)
Detail Level: Detailed
Add 96419 Cpt? (Important Note: In 2017 The Use Of 98297 Is Being Tracked By Cms To Determine Future Global Period Reimbursement For Global Periods): yes

## 2023-01-13 ENCOUNTER — APPOINTMENT (OUTPATIENT)
Dept: URBAN - METROPOLITAN AREA CLINIC 193 | Age: 63
Setting detail: DERMATOLOGY
End: 2023-01-15

## 2023-01-13 PROBLEM — C44.319 BASAL CELL CARCINOMA OF SKIN OF OTHER PARTS OF FACE: Status: ACTIVE | Noted: 2023-01-13

## 2023-01-13 PROCEDURE — 13132 CMPLX RPR F/C/C/M/N/AX/G/H/F: CPT

## 2023-01-13 PROCEDURE — 17312 MOHS ADDL STAGE: CPT

## 2023-01-13 PROCEDURE — 17311 MOHS 1 STAGE H/N/HF/G: CPT

## 2023-01-13 PROCEDURE — OTHER MOHS SURGERY: OTHER

## 2023-01-13 NOTE — PROCEDURE: MOHS SURGERY
Histology Selection Override (Optional- Will Default To Parent Diagnosis If N/A): Basosquamous Cell Carcinoma

## 2023-01-20 ENCOUNTER — APPOINTMENT (OUTPATIENT)
Dept: URBAN - METROPOLITAN AREA CLINIC 193 | Age: 63
Setting detail: DERMATOLOGY
End: 2023-01-22

## 2023-01-20 DIAGNOSIS — Z48.02 ENCOUNTER FOR REMOVAL OF SUTURES: ICD-10-CM

## 2023-01-20 PROBLEM — C44.311 BASAL CELL CARCINOMA OF SKIN OF NOSE: Status: ACTIVE | Noted: 2023-01-20

## 2023-01-20 PROCEDURE — 17311 MOHS 1 STAGE H/N/HF/G: CPT | Mod: 79

## 2023-01-20 PROCEDURE — OTHER SUTURE REMOVAL (GLOBAL PERIOD): OTHER

## 2023-01-20 PROCEDURE — 15275 SKIN SUB GRAFT FACE/NK/HF/G: CPT

## 2023-01-20 PROCEDURE — 17312 MOHS ADDL STAGE: CPT | Mod: 79

## 2023-01-20 PROCEDURE — 99024 POSTOP FOLLOW-UP VISIT: CPT

## 2023-01-20 PROCEDURE — OTHER MOHS SURGERY: OTHER

## 2023-01-20 ASSESSMENT — LOCATION SIMPLE DESCRIPTION DERM: LOCATION SIMPLE: LEFT TEMPLE

## 2023-01-20 ASSESSMENT — LOCATION DETAILED DESCRIPTION DERM: LOCATION DETAILED: LEFT MID TEMPLE

## 2023-01-20 ASSESSMENT — LOCATION ZONE DERM: LOCATION ZONE: FACE

## 2023-01-20 NOTE — PROCEDURE: SUTURE REMOVAL (GLOBAL PERIOD)
Body Location Override (Optional - Billing Will Still Be Based On Selected Body Map Location If Applicable): left temple
Detail Level: Detailed
Add 32176 Cpt? (Important Note: In 2017 The Use Of 33403 Is Being Tracked By Cms To Determine Future Global Period Reimbursement For Global Periods): yes

## 2023-01-20 NOTE — PROCEDURE: MOHS SURGERY
[address] [place] [address] PHYSICAL THERAPY  [address] [place] [address] Farheen Pope, [address] - Phone: [phone]  Fax: [phone] SUMMARY  Patient Name: Sharlene Courtney : 1964   Treatment/Medical Diagnosis: Vertigo [R42]  Gait instability [R26.81]   Referral Source: Juan Jose Merchant,*     Date of Initial Visit: 3/8/2021 Attended Visits: 8 Missed Visits: 7     SUMMARY OF TREATMENT  Treatment has consisted of initial evaluation and 7 treatment sessions, which have included vestibular training, balance training, ROM/stretching/strengthening exercises, functional mobility/gait training and patient education (including HEP). CURRENT STATUS  Patient was progressing slowly with exercises in clinic, reporting compliance with HEP. Patient was last seen in clinic on 4/15/2021. At that time, patient reported mild dizziness and reported that he had an appointment scheduled with an ENT on 2021. Additionally, patient reported LBP due to having placed a lawnmower in the back of a truck a couple of days prior. Patient did not show for appointments scheduled for 2021, 2021, 2021 and 2021. Patient was called and message was left advising of next appointment after 2021, 2021 and 2021 no shows. Additionally, message was left after 2021 no show advising patient that he would be discharged if he missed another appointment. Goal/Measure of Progress Goal Met? 1. Patient will report a 50% decrease in symptoms to improve safety and tolerance with ADL's. Status at last Eval: 42% Current Status: NT no   2. Patient will complete FGA for further assessment of balance/gait and fall risk   Status at last Eval: NT Current Status: NT no   3. Patient will be independent with home exercise program.   Status at last Eval: Compliant with HEP Current Status: Compliant with HEP no   4.   Patient will increase FOTO Functional Status score to 46/100 to indicate decreased functional limitations. Status at last Eval: 43/100 Current Status: NT no     RECOMMENDATIONS  Discontinue therapy due to lack of attendance or compliance. If you have any questions/comments please contact us directly at [phone]. Thank you for allowing us to assist in the care of your patient. Therapist Signature: Armaan Jay, PT Date: 5/4/2021     Time: 1:37 PM     NOTE TO PHYSICIAN:  PLEASE COMPLETE THE ORDERS BELOW AND FAX TO   [facility]: [phone]. If you are unable to process this request in 24 hours please contact our office: [phone]. To ensure we are able to process the patients encounter and avoid risk of your patient receiving a bill for our services, please sign and return this discharge summary by 6/3/2021. (30days following DC date). Per Medicaid guidelines, discharge summary must be signed by physician.    ___ I have read the above report and request that my patient be discharged from therapy.      Physician Signature:        Date:       Time: Ear Star Wedge Flap Text: The defect edges were debeveled with a #15 blade scalpel.  Given the location of the defect and the proximity to free margins (helical rim) an ear star wedge flap was deemed most appropriate.  Using a sterile surgical marker, the appropriate flap was drawn incorporating the defect and placing the expected incisions between the helical rim and antihelix where possible.  The area thus outlined was incised through and through with a #15 scalpel blade.

## 2023-01-20 NOTE — PROCEDURE: MOHS SURGERY
Bcc Adenoid Histology Text: There were aggregates of basaloid cells demonstrating an adenoid or cribiform pattern. surgery

## 2023-01-20 NOTE — PROCEDURE: MOHS SURGERY
Body Location Override (Optional - Billing Will Still Be Based On Selected Body Map Location If Applicable): nasal supratip and left nasal ala

## 2023-01-20 NOTE — PROCEDURE: MOHS SURGERY
Physical Exam  Skin: Post-Care Instructions: I reviewed with the patient in detail written post-care instructions. Patient is not to engage in swimming for the next 7 days. Should the patient develop any fevers, chills, bleeding, severe pain patient will contact the office immediately. Patient aware not to get permanent bandage wet.

## 2023-01-27 ENCOUNTER — APPOINTMENT (OUTPATIENT)
Dept: URBAN - METROPOLITAN AREA CLINIC 193 | Age: 63
Setting detail: DERMATOLOGY
End: 2023-01-28

## 2023-01-27 DIAGNOSIS — S31000A OPEN WOUND(S) (MULTIPLE) OF UNSPECIFIED SITE(S), WITHOUT MENTION OF COMPLICATION: ICD-10-CM

## 2023-01-27 PROBLEM — S01.20XA UNSPECIFIED OPEN WOUND OF NOSE, INITIAL ENCOUNTER: Status: ACTIVE | Noted: 2023-01-27

## 2023-01-27 PROCEDURE — OTHER REPAIR NOTE: OTHER

## 2023-01-27 PROCEDURE — 15275 SKIN SUB GRAFT FACE/NK/HF/G: CPT

## 2023-01-27 ASSESSMENT — LOCATION SIMPLE DESCRIPTION DERM: LOCATION SIMPLE: LEFT NOSE

## 2023-01-27 ASSESSMENT — LOCATION DETAILED DESCRIPTION DERM: LOCATION DETAILED: LEFT NASAL ALA

## 2023-01-27 ASSESSMENT — LOCATION ZONE DERM: LOCATION ZONE: NOSE

## 2023-01-27 NOTE — PROCEDURE: REPAIR NOTE
M-Plasty Intermediate Repair Preamble Text (Leave Blank If You Do Not Want): Undermining was performed with blunt dissection. Statement Selected

## 2023-01-27 NOTE — PROCEDURE: REPAIR NOTE
Post-Care Instructions: I reviewed with the patient in detail post-care instructions. Patient is not to engage in any heavy lifting, exercise, or swimming for the next 7 days and need to keep the bandage dry. Should the patient develop any fevers, chills, bleeding, severe pain patient will contact the office immediately.

## 2023-02-07 ENCOUNTER — APPOINTMENT (OUTPATIENT)
Dept: URBAN - METROPOLITAN AREA CLINIC 193 | Age: 63
Setting detail: DERMATOLOGY
End: 2023-02-08

## 2023-02-07 DIAGNOSIS — S31000A OPEN WOUND(S) (MULTIPLE) OF UNSPECIFIED SITE(S), WITHOUT MENTION OF COMPLICATION: ICD-10-CM

## 2023-02-07 PROBLEM — S01.20XA UNSPECIFIED OPEN WOUND OF NOSE, INITIAL ENCOUNTER: Status: ACTIVE | Noted: 2023-02-07

## 2023-02-07 PROCEDURE — OTHER REPAIR NOTE: OTHER

## 2023-02-07 PROCEDURE — 15275 SKIN SUB GRAFT FACE/NK/HF/G: CPT

## 2023-02-07 ASSESSMENT — LOCATION ZONE DERM: LOCATION ZONE: NOSE

## 2023-02-07 ASSESSMENT — LOCATION DETAILED DESCRIPTION DERM: LOCATION DETAILED: LEFT NASAL ALA

## 2023-02-07 ASSESSMENT — LOCATION SIMPLE DESCRIPTION DERM: LOCATION SIMPLE: LEFT NOSE

## 2023-02-16 ENCOUNTER — APPOINTMENT (OUTPATIENT)
Dept: URBAN - METROPOLITAN AREA CLINIC 193 | Age: 63
Setting detail: DERMATOLOGY
End: 2023-02-18

## 2023-02-16 ENCOUNTER — APPOINTMENT (OUTPATIENT)
Dept: URBAN - METROPOLITAN AREA CLINIC 193 | Age: 63
Setting detail: DERMATOLOGY
End: 2023-02-16

## 2023-02-16 DIAGNOSIS — Z48.817 ENCOUNTER FOR SURGICAL AFTERCARE FOLLOWING SURGERY ON THE SKIN AND SUBCUTANEOUS TISSUE: ICD-10-CM

## 2023-02-16 DIAGNOSIS — Z85.828 PERSONAL HISTORY OF OTHER MALIGNANT NEOPLASM OF SKIN: ICD-10-CM

## 2023-02-16 DIAGNOSIS — L82.1 OTHER SEBORRHEIC KERATOSIS: ICD-10-CM

## 2023-02-16 DIAGNOSIS — L57.8 OTHER SKIN CHANGES DUE TO CHRONIC EXPOSURE TO NONIONIZING RADIATION: ICD-10-CM

## 2023-02-16 DIAGNOSIS — L57.0 ACTINIC KERATOSIS: ICD-10-CM

## 2023-02-16 DIAGNOSIS — S31000A OPEN WOUND(S) (MULTIPLE) OF UNSPECIFIED SITE(S), WITHOUT MENTION OF COMPLICATION: ICD-10-CM

## 2023-02-16 DIAGNOSIS — D22 MELANOCYTIC NEVI: ICD-10-CM

## 2023-02-16 DIAGNOSIS — L81.4 OTHER MELANIN HYPERPIGMENTATION: ICD-10-CM

## 2023-02-16 PROBLEM — S01.20XA UNSPECIFIED OPEN WOUND OF NOSE, INITIAL ENCOUNTER: Status: ACTIVE | Noted: 2023-02-16

## 2023-02-16 PROBLEM — D22.62 MELANOCYTIC NEVI OF LEFT UPPER LIMB, INCLUDING SHOULDER: Status: ACTIVE | Noted: 2023-02-16

## 2023-02-16 PROBLEM — D22.5 MELANOCYTIC NEVI OF TRUNK: Status: ACTIVE | Noted: 2023-02-16

## 2023-02-16 PROBLEM — D22.39 MELANOCYTIC NEVI OF OTHER PARTS OF FACE: Status: ACTIVE | Noted: 2023-02-16

## 2023-02-16 PROCEDURE — OTHER PRESCRIPTION MEDICATION MANAGEMENT: OTHER

## 2023-02-16 PROCEDURE — OTHER REPAIR NOTE: OTHER

## 2023-02-16 PROCEDURE — OTHER POST-OP WOUND CHECK: OTHER

## 2023-02-16 PROCEDURE — OTHER COUNSELING: OTHER

## 2023-02-16 PROCEDURE — 99214 OFFICE O/P EST MOD 30 MIN: CPT

## 2023-02-16 PROCEDURE — 15275 SKIN SUB GRAFT FACE/NK/HF/G: CPT

## 2023-02-16 PROCEDURE — 99024 POSTOP FOLLOW-UP VISIT: CPT

## 2023-02-16 ASSESSMENT — LOCATION SIMPLE DESCRIPTION DERM
LOCATION SIMPLE: LEFT UPPER ARM
LOCATION SIMPLE: LEFT FOREHEAD
LOCATION SIMPLE: RIGHT FOREARM
LOCATION SIMPLE: LEFT CHEEK
LOCATION SIMPLE: RIGHT UPPER BACK
LOCATION SIMPLE: LEFT FOREARM
LOCATION SIMPLE: ABDOMEN
LOCATION SIMPLE: LEFT NOSE
LOCATION SIMPLE: CHEST

## 2023-02-16 ASSESSMENT — LOCATION DETAILED DESCRIPTION DERM
LOCATION DETAILED: LEFT ANTERIOR PROXIMAL UPPER ARM
LOCATION DETAILED: LEFT INFERIOR FOREHEAD
LOCATION DETAILED: MIDDLE STERNUM
LOCATION DETAILED: EPIGASTRIC SKIN
LOCATION DETAILED: RIGHT MEDIAL SUPERIOR CHEST
LOCATION DETAILED: LEFT PROXIMAL DORSAL FOREARM
LOCATION DETAILED: LEFT ANTECUBITAL SKIN
LOCATION DETAILED: LEFT INFERIOR MEDIAL MALAR CHEEK
LOCATION DETAILED: LEFT NASAL ALA
LOCATION DETAILED: RIGHT PROXIMAL DORSAL FOREARM
LOCATION DETAILED: RIGHT MEDIAL UPPER BACK

## 2023-02-16 ASSESSMENT — LOCATION ZONE DERM
LOCATION ZONE: ARM
LOCATION ZONE: NOSE
LOCATION ZONE: FACE
LOCATION ZONE: FACE
LOCATION ZONE: TRUNK

## 2023-02-16 NOTE — PROCEDURE: COUNSELING
Detail Level: Detailed
Detail Level: Generalized
Detail Level: Zone
Patient Specific Counseling (Will Not Stick From Patient To Patient): severe widespread actinic damage; multiple HAKs on forearms and dorsal hands-- has efudex at home; will apply BID x 2-3 weeks\\ndiscussed plan for calcipotriene/efudex at refill through [facility]-- efudex cost him 400$

## 2023-02-16 NOTE — PROCEDURE: POST-OP WOUND CHECK
Wound Evaluated By: Rocio Staton DO
Additional Comments: Cyst like fluid expressed from one small area.
Detail Level: Detailed
Body Location Override (Optional - Billing Will Still Be Based On Selected Body Map Location If Applicable): left temple
Add 47696 Cpt? (Important Note: In 2017 The Use Of 58904 Is Being Tracked By Cms To Determine Future Global Period Reimbursement For Global Periods): yes

## 2023-02-16 NOTE — PROCEDURE: REPAIR NOTE
Spontaneous, unlabored and symmetrical Mastoid Interpolation Flap Division And Inset Text: Division and inset of the mastoid interpolation flap was performed to achieve optimal aesthetic result, restore normal anatomic appearance and avoid distortion of normal anatomy, expedite and facilitate wound healing, achieve optimal functional result and because linear closure either not possible or would produce suboptimal result. The patient was prepped and draped in the usual manner. The pedicle was infiltrated with local anesthesia. The pedicle was sectioned with a #15 blade. The pedicle was de-bulked and trimmed to match the shape of the defect. Hemostasis was achieved. The flap donor site and free margin of the flap were secured with deep buried sutures and the wound edges were re-approximated.

## 2023-02-16 NOTE — PROCEDURE: PRESCRIPTION MEDICATION MANAGEMENT
Render In Strict Bullet Format?: No
Initiate Treatment: efudex cream BID x 2-3 weeks as tolerated for arms
Detail Level: Zone

## 2023-02-27 ENCOUNTER — APPOINTMENT (OUTPATIENT)
Dept: URBAN - METROPOLITAN AREA CLINIC 193 | Age: 63
Setting detail: DERMATOLOGY
End: 2023-03-01

## 2023-02-27 DIAGNOSIS — Z48.817 ENCOUNTER FOR SURGICAL AFTERCARE FOLLOWING SURGERY ON THE SKIN AND SUBCUTANEOUS TISSUE: ICD-10-CM

## 2023-02-27 PROCEDURE — 99024 POSTOP FOLLOW-UP VISIT: CPT

## 2023-02-27 PROCEDURE — OTHER POST-OP WOUND CHECK: OTHER

## 2023-02-27 ASSESSMENT — LOCATION DETAILED DESCRIPTION DERM: LOCATION DETAILED: LEFT NASAL ALA

## 2023-02-27 ASSESSMENT — LOCATION ZONE DERM: LOCATION ZONE: NOSE

## 2023-02-27 ASSESSMENT — LOCATION SIMPLE DESCRIPTION DERM: LOCATION SIMPLE: LEFT NOSE

## 2023-02-27 NOTE — PROCEDURE: POST-OP WOUND CHECK
Wound Evaluated By: Rocio Staton DO
Detail Level: Detailed
Add 65046 Cpt? (Important Note: In 2017 The Use Of 68380 Is Being Tracked By Cms To Determine Future Global Period Reimbursement For Global Periods): yes

## 2023-03-13 ENCOUNTER — APPOINTMENT (OUTPATIENT)
Dept: URBAN - METROPOLITAN AREA CLINIC 193 | Age: 63
Setting detail: DERMATOLOGY
End: 2023-03-13

## 2023-03-13 DIAGNOSIS — Z48.817 ENCOUNTER FOR SURGICAL AFTERCARE FOLLOWING SURGERY ON THE SKIN AND SUBCUTANEOUS TISSUE: ICD-10-CM

## 2023-03-13 PROCEDURE — 99024 POSTOP FOLLOW-UP VISIT: CPT

## 2023-03-13 PROCEDURE — OTHER POST-OP WOUND CHECK: OTHER

## 2023-03-13 ASSESSMENT — LOCATION DETAILED DESCRIPTION DERM: LOCATION DETAILED: LEFT NASAL ALA

## 2023-03-13 ASSESSMENT — LOCATION ZONE DERM: LOCATION ZONE: NOSE

## 2023-03-13 ASSESSMENT — LOCATION SIMPLE DESCRIPTION DERM: LOCATION SIMPLE: LEFT NOSE

## 2023-03-13 NOTE — PROCEDURE: POST-OP WOUND CHECK
Detail Level: Detailed
Wound Evaluated By: Rocio Staton DO
Add 96093 Cpt? (Important Note: In 2017 The Use Of 83305 Is Being Tracked By Cms To Determine Future Global Period Reimbursement For Global Periods): yes

## 2023-04-14 NOTE — PLAN OF CARE
-- DO NOT REPLY / DO NOT REPLY ALL --  -- Message is from [facility] ([facility]) --    ONLY TO BE USED WITHIN A REFILL MEDICATION ENCOUNTER    Patients daughter asking for refills to be put on because she just had her appointment     Med Refill  Is the patient currently having any symptoms?: No/Non-Emergent symptoms    Name of medication requested: See pended med    Has patient contacted the pharmacy? Not Applicable-Patient states reason is new p harmacy    Is this the first request for the medication in the last 48 hours?: Yes    Patient is requesting a medication refill - medication is on active medication list    Patient is currently OUT of the requested medication - sent as HIGH priority      Full name of the provider who ordered the medication: [facility] site name / Account # for provider: evangelina bowers    Preferred Pharmacy: Pharmacy  [id number] - [address]    Patient confirmed the above pharmacy as correct?  Yes      Caller Information       Type Contact Phone/Fax    04/14/2023 10:21 AM CDT Phone (Incoming) EVARISTO HAUSER (Emergency Contact) [phone] (M)          Alternative phone number: [phone]    Can a detailed message be left?: Yes    Patient is completely out of medication: Verify if patient is currently experiencing symptoms. If patient is symptomatic, proceed with front end triage instead of medication refill. If patient is not symptomatic but is completely out of medication, emanuel as High priority when routing. Inform patient: “Please call back with any questions or concerns and if your condition becomes life threatening, you should seek immediate medical assistance by calling 911 or going to the Emergency Department for evaluation.”    Inform all patients: \"If the clinical team needs to contact you regarding this refill, please be aware the return phone call may come from an unidentified or out of state phone number and your  Problem: Patient Care Overview  Goal: Plan of Care Review  Outcome: Ongoing (interventions implemented as appropriate)  VSS stable, patient free of falls during this shift, continues to have loose stools   05/26/19 0351   Coping/Psychosocial   Plan Of Care Reviewed With patient     Goal: Individualization & Mutuality  Outcome: Ongoing (interventions implemented as appropriate)    Goal: Discharge Needs Assessment  Outcome: Ongoing (interventions implemented as appropriate)    Goal: Interprofessional Rounds/Family Conf  Outcome: Ongoing (interventions implemented as appropriate)      Problem: Pressure Ulcer Risk (Phil Scale) (Adult,Obstetrics,Pediatric)  Goal: Identify Related Risk Factors and Signs and Symptoms  Outcome: Ongoing (interventions implemented as appropriate)    Goal: Skin Integrity  Outcome: Ongoing (interventions implemented as appropriate)      Problem: Fall Risk (Adult)  Goal: Identify Related Risk Factors and Signs and Symptoms  Outcome: Ongoing (interventions implemented as appropriate)    Goal: Absence of Falls  Outcome: Ongoing (interventions implemented as appropriate)      Problem: Pain, Acute (Adult)  Goal: Identify Related Risk Factors and Signs and Symptoms  Outcome: Ongoing (interventions implemented as appropriate)    Goal: Acceptable Pain Control/Comfort Level  Outcome: Ongoing (interventions implemented as appropriate)         refill request will be addressed as soon as the clinical team reviews your message.\"

## 2023-09-03 NOTE — PROCEDURE: MOHS SURGERY
Problem: Discharge Planning  Goal: Discharge to home or other facility with appropriate resources  9/2/2023 1233 by Karen Miller  Outcome: Progressing  Flowsheets (Taken 9/2/2023 0800)  Discharge to home or other facility with appropriate resources:   Identify barriers to discharge with patient and caregiver   Identify discharge learning needs (meds, wound care, etc)  9/2/2023 0335 by Puja Street RN  Outcome: Progressing  Flowsheets (Taken 9/1/2023 2000)  Discharge to home or other facility with appropriate resources: Identify barriers to discharge with patient and caregiver     Problem: Safety - Adult  Goal: Free from fall injury  9/2/2023 1233 by Karen Miller  Outcome: Progressing  9/2/2023 0335 by Puja Street RN  Outcome: Progressing     Problem: Pain  Goal: Verbalizes/displays adequate comfort level or baseline comfort level  Outcome: Progressing Problem: Discharge Planning  Goal: Discharge to home or other facility with appropriate resources  9/2/2023 2313 by Ro Abrams RN  Outcome: Progressing  Flowsheets   Discharge to home or other facility with appropriate resources:   Identify barriers to discharge with patient and caregiver   Identify discharge learning needs (meds, wound care, etc)   Arrange for needed discharge resources and transportation as appropriate   Arrange for interpreters to assist at discharge as needed   Refer to discharge planning if patient needs post-hospital services based on physician order or complex needs related to functional status, cognitive ability or social support system     Problem: Safety - Adult  Goal: Free from fall injury  9/2/2023 2313 by Ro Abrams RN  Outcome: Progressing       Problem: Pain  Goal: Verbalizes/displays adequate comfort level or baseline comfort level  9/2/2023 2313 by Ro Abrams RN  Outcome: Progressing    Problem: Chronic Conditions and Co-morbidities  Goal: Patient's chronic conditions and co-morbidity symptoms are monitored and maintained or improved  Outcome: Progressing Problem: Discharge Planning  Goal: Discharge to home or other facility with appropriate resources  9/3/2023 1302 by Mimi Miller  Outcome: Progressing  Flowsheets (Taken 9/3/2023 0800)  Discharge to home or other facility with appropriate resources:   Identify barriers to discharge with patient and caregiver   Identify discharge learning needs (meds, wound care, etc)  9/2/2023 2313 by Ronald Hunter RN  Outcome: Progressing     Problem: Safety - Adult  Goal: Free from fall injury  9/3/2023 1302 by Mimi Miller  Outcome: Progressing  9/2/2023 2313 by Ronald Hunter RN  Outcome: Progressing     Problem: Pain  Goal: Verbalizes/displays adequate comfort level or baseline comfort level  9/3/2023 1302 by Mimi Miller  Outcome: Progressing  Flowsheets (Taken 9/3/2023 0830 by Tommy Pedersen RN)  Verbalizes/displays adequate comfort level or baseline comfort level:   Encourage patient to monitor pain and request assistance   Assess pain using appropriate pain scale   Administer analgesics based on type and severity of pain and evaluate response   Implement non-pharmacological measures as appropriate and evaluate response   Consider cultural and social influences on pain and pain management   Notify Licensed Independent Practitioner if interventions unsuccessful or patient reports new pain  9/2/2023 2313 by Ronald Hunter RN  Outcome: Progressing     Problem: Chronic Conditions and Co-morbidities  Goal: Patient's chronic conditions and co-morbidity symptoms are monitored and maintained or improved  9/3/2023 1302 by Mimi Miller  Outcome: Progressing  Flowsheets (Taken 9/3/2023 0800)  Care Plan - Patient's Chronic Conditions and Co-Morbidity Symptoms are Monitored and Maintained or Improved:   Update acute care plan with appropriate goals if chronic or comorbid symptoms are exacerbated and prevent overall improvement and discharge   Monitor and assess patient's chronic conditions and Problem: Discharge Planning  Goal: Discharge to home or other facility with appropriate resources  Outcome: Progressing  Flowsheets (Taken 9/1/2023 2000)  Discharge to home or other facility with appropriate resources: Identify barriers to discharge with patient and caregiver     Problem: Safety - Adult  Goal: Free from fall injury  Outcome: Progressing Wound Care: Petrolatum prevent overall improvement and discharge   Monitor and assess patient's chronic conditions and comorbid symptoms for stability, deterioration, or improvement   Collaborate with multidisciplinary team to address chronic and comorbid conditions and prevent exacerbation or deterioration

## 2023-11-06 ENCOUNTER — OFFICE VISIT (OUTPATIENT)
Dept: SURGERY | Facility: CLINIC | Age: 63
End: 2023-11-06
Payer: MEDICARE

## 2023-11-06 VITALS
DIASTOLIC BLOOD PRESSURE: 84 MMHG | HEIGHT: 68 IN | BODY MASS INDEX: 31.07 KG/M2 | OXYGEN SATURATION: 97 % | WEIGHT: 205 LBS | HEART RATE: 74 BPM | SYSTOLIC BLOOD PRESSURE: 149 MMHG | TEMPERATURE: 97.9 F

## 2023-11-06 DIAGNOSIS — K50.119 CROHN'S DISEASE OF COLON WITH COMPLICATION (CMS/HCC): ICD-10-CM

## 2023-11-06 DIAGNOSIS — K43.5 PARASTOMAL HERNIA WITHOUT OBSTRUCTION OR GANGRENE: Primary | ICD-10-CM

## 2023-11-06 PROCEDURE — 99213 OFFICE O/P EST LOW 20 MIN: CPT | Performed by: COLON & RECTAL SURGERY

## 2023-11-06 RX ORDER — LISINOPRIL 5 MG/1
5 TABLET ORAL EVERY MORNING
COMMUNITY
Start: 2023-08-02

## 2023-11-06 RX ORDER — SILDENAFIL 100 MG/1
100 TABLET, FILM COATED ORAL DAILY PRN
COMMUNITY
Start: 2023-02-17

## 2023-11-06 NOTE — PROGRESS NOTES
"Colorectal Surgery Follow-up    Subjective     Layton Ritchie Jr. is a 63 y.o. male who returns in follow-up for his end ileostomy.    In May 2019, he developed a megacolon from severe Crohn's colitis and a diverting loop ileostomy and blowhole colostomy was performed.  The hope was that with biologic therapy he would get healing of his colon and prevent permanent ileostomy.  However, he continued to have endoscopic evidence of severe left-sided colitis with anal ulceration and stenosis and he also had significant problems with prolapse of his transverse loop colostomy and on 3/11/2020 he had a robotic assisted total proctocolectomy with an omental pedicle flap in the pelvis.  He had a revision of the ileostomy to an end stoma at a new site because of hernia and problems with appliance placement at the previous loop ileostomy site.    He developed a peristomal hernia at the new ileostomy.    His stoma hernia is his only complaint.  It seems to be getting bigger for him.  It bulges in his unsightly for him.  He has to wear baggy clothes.  He is not having any problems with his ileostomy appliance for the peristomal skin.  He does not have any pain or obstructive symptoms.  He tried to use a abdominal binder/support garment but that only increased his discomfort.    Medical History:   Past Medical History:   Diagnosis Date    Basal cell carcinoma (BCC) in situ of skin     right nare, left arm     Crohn's disease of colon with complication (CMS/HCC) 2012    \"Crohn's colitis with megacolon\"       Surgical History:   Past Surgical History:   Procedure Laterality Date    COLON SURGERY  05/27/2019    Colostomy/Ileostomy Laparoscopic    OTHER SURGICAL HISTORY Right 12/2019    TAKEDOWN PARAMEDIAN FOREHEAD FLAP    TEAR DUCT SURGERY  12/2019    repaired after basal cell skin cancer was removed from right side of nose- with flap    TONSILLECTOMY  1962       Allergies: Patient has no known allergies.    Current " "Medications:    amLODIPine    lisinopriL    sildenafiL    ustekinumab (STELARA SUBQ)    amoxicillin-pot clavulanate    Objective     Physicial Exam  Visit Vitals  BP (!) 149/84 (BP Location: Left upper arm, Patient Position: Sitting)   Pulse 74   Temp 36.6 °C (97.9 °F) (Oral)   Ht 1.727 m (5' 8\")   Wt 93 kg (205 lb)   SpO2 97%   BMI 31.17 kg/m²       Physical Exam  Vitals reviewed.   Constitutional:       Appearance: Normal appearance. He is well-developed.   HENT:      Head: Normocephalic and atraumatic.   Eyes:      Pupils: Pupils are equal, round, and reactive to light.   Cardiovascular:      Rate and Rhythm: Normal rate and regular rhythm.      Heart sounds: Normal heart sounds. No murmur heard.  Pulmonary:      Effort: Pulmonary effort is normal. No respiratory distress.      Breath sounds: Normal breath sounds. No wheezing.   Abdominal:      General: There is no distension.      Palpations: Abdomen is soft. There is no mass.      Tenderness: There is no abdominal tenderness.      Hernia: No hernia is present.      Comments: Healthy ileostomy in the right abdomen the protrudes beautifully.  There is no peristomal skin excoriation.  There are some small mucosal erosions just adjacent on the skin that I treated with silver nitrate.  He has a prominent but reducible and nontender peristomal hernia.  There is a scar inferior to the stoma at the skin crease from his previous loop ileostomy.  He also has a scar in the left upper quadrant from his colostomy site.  I do not detect any hernia there.   Genitourinary:     Comments: The anus is well-healed with no drainage.  Musculoskeletal:         General: No tenderness. Normal range of motion.      Cervical back: Normal range of motion and neck supple.   Lymphadenopathy:      Cervical: No cervical adenopathy.   Skin:     General: Skin is warm and dry.      Capillary Refill: Capillary refill takes less than 2 seconds.      Findings: No rash.   Neurological:      " Mental Status: He is alert and oriented to person, place, and time.      Cranial Nerves: No cranial nerve deficit.   Psychiatric:         Mood and Affect: Mood normal.         Behavior: Behavior normal.             Assessment     Problem List Items Addressed This Visit        Digestive    Crohn's disease of colon with complication (CMS/HCC)    Current Assessment & Plan     He continues on Stelara.  He has not seen Dr. Carlson for 6 months but will continue to follow with him.  Apparently he still has some inflammatory markers that are elevated hence the continued biologic therapy.  There is no sign of any Crohn's disease of the ileostomy.         Parastomal hernia - Primary    Current Assessment & Plan     Ed has a well-functioning ileostomy and not having any pouching issues.  He is bothered by his parastomal hernia as expected.  We discussed options for repair.  I think we would aim for doing reciting to the left side if we were going to address this.  I think we need to be forced into it however as he is doing well with the ileostomy at this point.  If he develops obstructive symptoms or pain or has pouching problems, we will discuss surgery.  I will see him back in 1 year.                  James Hernandez MD

## 2023-11-06 NOTE — ASSESSMENT & PLAN NOTE
Ed has a well-functioning ileostomy and not having any pouching issues.  He is bothered by his parastomal hernia as expected.  We discussed options for repair.  I think we would aim for doing reciting to the left side if we were going to address this.  I think we need to be forced into it however as he is doing well with the ileostomy at this point.  If he develops obstructive symptoms or pain or has pouching problems, we will discuss surgery.  I will see him back in 1 year.

## 2023-11-06 NOTE — LETTER
"  Dear Kristofer,    I saw Layton Ritchie Jr. in the office today in follow-up. Please see my note below.    If you have any additional questions, please do not hesitate to call me.    Sincerely,    Mk Hernandez MD      _____________________________________      Colorectal Surgery Follow-up    Subjective     Layton Ritchie Jr. is a 63 y.o. male who returns in follow-up for his end ileostomy.    In May 2019, he developed a megacolon from severe Crohn's colitis and a diverting loop ileostomy and blowhole colostomy was performed.  The hope was that with biologic therapy he would get healing of his colon and prevent permanent ileostomy.  However, he continued to have endoscopic evidence of severe left-sided colitis with anal ulceration and stenosis and he also had significant problems with prolapse of his transverse loop colostomy and on 3/11/2020 he had a robotic assisted total proctocolectomy with an omental pedicle flap in the pelvis.  He had a revision of the ileostomy to an end stoma at a new site because of hernia and problems with appliance placement at the previous loop ileostomy site.    He developed a peristomal hernia at the new ileostomy.    His stoma hernia is his only complaint.  It seems to be getting bigger for him.  It bulges in his unsightly for him.  He has to wear baggy clothes.  He is not having any problems with his ileostomy appliance for the peristomal skin.  He does not have any pain or obstructive symptoms.  He tried to use a abdominal binder/support garment but that only increased his discomfort.    Medical History:   Past Medical History:   Diagnosis Date    Basal cell carcinoma (BCC) in situ of skin     right nare, left arm     Crohn's disease of colon with complication (CMS/HCC) 2012    \"Crohn's colitis with megacolon\"       Surgical History:   Past Surgical History:   Procedure Laterality Date    COLON SURGERY  05/27/2019    Colostomy/Ileostomy Laparoscopic    OTHER SURGICAL " "HISTORY Right 12/2019    TAKEDOWN PARAMEDIAN FOREHEAD FLAP    TEAR DUCT SURGERY  12/2019    repaired after basal cell skin cancer was removed from right side of nose- with flap    TONSILLECTOMY  1962       Allergies: Patient has no known allergies.    Current Medications:    amLODIPine    lisinopriL    sildenafiL    ustekinumab (STELARA SUBQ)    amoxicillin-pot clavulanate    Objective     Physicial Exam  Visit Vitals  BP (!) 149/84 (BP Location: Left upper arm, Patient Position: Sitting)   Pulse 74   Temp 36.6 °C (97.9 °F) (Oral)   Ht 1.727 m (5' 8\")   Wt 93 kg (205 lb)   SpO2 97%   BMI 31.17 kg/m²       Physical Exam  Vitals reviewed.   Constitutional:       Appearance: Normal appearance. He is well-developed.   HENT:      Head: Normocephalic and atraumatic.   Eyes:      Pupils: Pupils are equal, round, and reactive to light.   Cardiovascular:      Rate and Rhythm: Normal rate and regular rhythm.      Heart sounds: Normal heart sounds. No murmur heard.  Pulmonary:      Effort: Pulmonary effort is normal. No respiratory distress.      Breath sounds: Normal breath sounds. No wheezing.   Abdominal:      General: There is no distension.      Palpations: Abdomen is soft. There is no mass.      Tenderness: There is no abdominal tenderness.      Hernia: No hernia is present.      Comments: Healthy ileostomy in the right abdomen the protrudes beautifully.  There is no peristomal skin excoriation.  There are some small mucosal erosions just adjacent on the skin that I treated with silver nitrate.  He has a prominent but reducible and nontender peristomal hernia.  There is a scar inferior to the stoma at the skin crease from his previous loop ileostomy.  He also has a scar in the left upper quadrant from his colostomy site.  I do not detect any hernia there.   Genitourinary:     Comments: The anus is well-healed with no drainage.  Musculoskeletal:         General: No tenderness. Normal range of motion.      Cervical " back: Normal range of motion and neck supple.   Lymphadenopathy:      Cervical: No cervical adenopathy.   Skin:     General: Skin is warm and dry.      Capillary Refill: Capillary refill takes less than 2 seconds.      Findings: No rash.   Neurological:      Mental Status: He is alert and oriented to person, place, and time.      Cranial Nerves: No cranial nerve deficit.   Psychiatric:         Mood and Affect: Mood normal.         Behavior: Behavior normal.             Assessment     Problem List Items Addressed This Visit        Digestive    Crohn's disease of colon with complication (CMS/HCC)    Current Assessment & Plan     He continues on Stelara.  He has not seen Dr. Carlson for 6 months but will continue to follow with him.  Apparently he still has some inflammatory markers that are elevated hence the continued biologic therapy.  There is no sign of any Crohn's disease of the ileostomy.         Parastomal hernia - Primary    Current Assessment & Plan     Ed has a well-functioning ileostomy and not having any pouching issues.  He is bothered by his parastomal hernia as expected.  We discussed options for repair.  I think we would aim for doing reciting to the left side if we were going to address this.  I think we need to be forced into it however as he is doing well with the ileostomy at this point.  If he develops obstructive symptoms or pain or has pouching problems, we will discuss surgery.  I will see him back in 1 year.                 James Hernandez MD

## 2023-11-06 NOTE — ASSESSMENT & PLAN NOTE
He continues on Stelara.  He has not seen Dr. Carlson for 6 months but will continue to follow with him.  Apparently he still has some inflammatory markers that are elevated hence the continued biologic therapy.  There is no sign of any Crohn's disease of the ileostomy.

## 2024-05-31 NOTE — PROCEDURE: REPAIR NOTE
Pt here for removal of g tube   Discussed r/b with pt and germán Salazar and they agree  She wished to translate for pt instead of   Pt eating well  and wt stable a mos and can liquids well  Cva yr ago and now oriented fine and not confused  No dysphagia  Pe lungs clr  Cor rrr no murmer  Abd soft nontender and g tube site with proud flesh  Tube 2 yr old    Deflated balloon and tube very old and rubber breakdown  Then removed easily  Used silver nitrate sticks to cauterize proud flesh  Tolerated well  A/p s/p g tube removal  Resume diet and call if problems  Should close off within 2 wks and if not call us       Ftsg Text: The defect edges were debeveled with a #15 scalpel blade.  Given the location of the defect, shape of the defect and the proximity to free margins a full thickness skin graft was deemed most appropriate.  Using a sterile surgical marker, the primary defect shape was transferred to the donor site. The area thus outlined was incised deep to adipose tissue with a #15 scalpel blade.  The harvested graft was then trimmed of adipose tissue until only dermis and epidermis was left.  The skin margins of the secondary defect were undermined to an appropriate distance in all directions utilizing iris scissors.  The secondary defect was closed with interrupted buried subcutaneous sutures.  The skin edges were then re-apposed with running  sutures.  The skin graft was then placed in the primary defect and oriented appropriately.

## 2024-10-03 ENCOUNTER — TELEPHONE (OUTPATIENT)
Dept: SURGERY | Facility: CLINIC | Age: 64
End: 2024-10-03

## 2024-10-23 NOTE — PROCEDURE: REPAIR NOTE
Please forward to provider covering letter K.   Patient/Caregiver provided printed discharge information. Skin Substitute: EpiFix [employer]

## 2024-11-18 ENCOUNTER — OFFICE VISIT (OUTPATIENT)
Dept: SURGERY | Facility: CLINIC | Age: 64
End: 2024-11-18
Payer: MEDICARE

## 2024-11-18 VITALS
WEIGHT: 209 LBS | HEART RATE: 76 BPM | SYSTOLIC BLOOD PRESSURE: 152 MMHG | TEMPERATURE: 98.7 F | HEIGHT: 68 IN | DIASTOLIC BLOOD PRESSURE: 75 MMHG | BODY MASS INDEX: 31.67 KG/M2 | OXYGEN SATURATION: 96 %

## 2024-11-18 DIAGNOSIS — K50.119 CROHN'S DISEASE OF COLON WITH COMPLICATION (CMS/HCC): ICD-10-CM

## 2024-11-18 DIAGNOSIS — K43.5 PARASTOMAL HERNIA WITHOUT OBSTRUCTION OR GANGRENE: Primary | ICD-10-CM

## 2024-11-18 PROCEDURE — 17250 CHEM CAUT OF GRANLTJ TISSUE: CPT | Performed by: COLON & RECTAL SURGERY

## 2024-11-18 PROCEDURE — 99213 OFFICE O/P EST LOW 20 MIN: CPT | Mod: 25 | Performed by: COLON & RECTAL SURGERY

## 2024-11-18 NOTE — LETTER
"November 18, 2024     Rafael Velazquez MD  [address] [place] [address]    Patient: Layton Ritchie Jr.  YOB: 1960  Date of Visit: 11/18/2024      Dear Dr. Velazquez:    Thank you for referring Layton Ritchie Jr. to me for evaluation. Below are my notes for this consultation.    If you have questions, please do not hesitate to call me. I look forward to following your patient along with you.         Sincerely,        James Hernandez MD        CC: MD Mary Marin Robert B, MD  11/18/2024 10:08 AM  Sign when Signing Visit  Colorectal Surgery Follow-up    Subjective    Layton Ritchie Jr. is a 64 y.o. male who returns in follow-up for his end ileostomy.     In May 2019, he developed a megacolon from severe Crohn's colitis and a diverting loop ileostomy and blowhole colostomy was performed.  The hope was that with biologic therapy he would get healing of his colon and prevent permanent ileostomy.  However, he continued to have endoscopic evidence of severe left-sided colitis with anal ulceration and stenosis and he also had significant problems with prolapse of his transverse loop colostomy and on 3/11/2020 he had a robotic assisted total proctocolectomy with an omental pedicle flap in the pelvis.  He had a revision of the ileostomy to an end stoma at a new site because of hernia and problems with appliance placement at the previous loop ileostomy site.     He developed a peristomal hernia at the new ileostomy.    He feels that his peristomal hernia might be slightly larger than it was last year.  He is not having any difficulties with pouching.  He is not having any pain or obstructive symptoms.    Medical History:   Past Medical History:   Diagnosis Date   • Basal cell carcinoma (BCC) in situ of skin     right nare, left arm    • Crohn's disease of colon with complication (CMS/HCC) 2012    \"Crohn's colitis with megacolon\"       Surgical History:   Past Surgical History " "  Procedure Laterality Date   • Colon surgery  05/27/2019    Colostomy/Ileostomy Laparoscopic   • Colostomy/Ileostomy Laparoscopic N/A 5/27/2019    Performed by James Hernandez [place] at [facility] [place]   • Other surgical history Right 12/2019    TAKEDOWN PARAMEDIAN FOREHEAD FLAP   • PROCTOCOLECTOMY LAPAROSCOPIC ROBOTIC, END ILEOSTOMY N/A 3/11/2020    Performed by James Hernandez [place] at [facility] [place]   • Tear duct surgery  12/2019    repaired after basal cell skin cancer was removed from right side of nose- with flap   • Tonsillectomy  1962       Allergies: Patient has no known allergies.    Current Medications:  Current Outpatient Medications   Medication Sig Dispense Refill   • amLODIPine (NORVASC) 10 mg tablet Take 1 tablet by mouth daily.     • lisinopriL (PRINIVIL) 5 mg tablet Take 5 mg by mouth every morning.     • amoxicillin-pot clavulanate (AUGMENTIN) 875-125 mg per tablet TAKE 1 TABLET IN THE MORNING AND 1 TABLET BEFORE BEDTIME FOR 10 DAYS (Patient not taking: Reported on 11/18/2024)     • sildenafiL (VIAGRA) 100 mg tablet Take 100 mg by mouth once daily as needed. (Patient not taking: Reported on 11/18/2024)     • ustekinumab (STELARA SUBQ) Inject under the skin every 2 (two) months. Last 1/23/20 (Patient not taking: Reported on 11/18/2024)       No current facility-administered medications for this visit.       Objective    Physicial Exam  Visit Vitals  BP (!) 152/75 (BP Location: Left upper arm, Patient Position: Sitting)   Pulse 76   Temp 37.1 °C (98.7 °F) (Oral)   Ht 1.727 m (5' 8\")   Wt 94.8 kg (209 lb)   SpO2 96%   BMI 31.78 kg/m²       Physical Exam  Vitals reviewed.   Constitutional:       Appearance: Normal appearance. He is well-developed.   HENT:      Head: Normocephalic and atraumatic.   Eyes:      Pupils: Pupils are equal, round, and reactive to light.   Cardiovascular:      Rate and Rhythm: Normal rate and regular rhythm.      Heart sounds: Normal heart sounds. No murmur heard.  Pulmonary:      Effort: " Pulmonary effort is normal. No respiratory distress.      Breath sounds: Normal breath sounds. No wheezing.   Abdominal:      Palpations: Abdomen is soft. There is no mass.      Tenderness: There is no abdominal tenderness.      Comments: The abdomen is flat and soft except for bulging at the ileostomy site.  I do not feel any obvious hernia defects that is other old stoma sites.  The parastomal hernia soft and reducible.  The ileostomy itself looks healthy.  There is some inflammatory nodules at the mucocutaneous junction with some mucosal rests on the skin that were all treated with silver nitrate.  He tolerated this well.   Musculoskeletal:         General: No tenderness. Normal range of motion.      Cervical back: Normal range of motion and neck supple.   Lymphadenopathy:      Cervical: No cervical adenopathy.   Skin:     General: Skin is warm and dry.      Capillary Refill: Capillary refill takes less than 2 seconds.      Findings: No rash.   Neurological:      Mental Status: He is alert and oriented to person, place, and time.      Cranial Nerves: No cranial nerve deficit.   Psychiatric:         Mood and Affect: Mood normal.         Behavior: Behavior normal.             Assessment    Problem List Items Addressed This Visit          Digestive    Crohn's disease of colon with complication (CMS/HCC)    Current Assessment & Plan     He continues on Stelara.  He needs to continue to follow-up with GI now that Dr. Carlson is retired.  I asked him to follow-up with Dr. Gallardo.         Parastomal hernia - Primary    Current Assessment & Plan     His ileostomy is functioning nicely.  His peristomal hernia is stable.  We discussed trying to avoid any surgical intervention for the hernia at this time.  If he starts having obstructive symptoms, pain, or difficulty with pouching, we may need to do a revision.  I will plan to see him back in 1 year.                  James Hernandez MD

## 2024-11-18 NOTE — PROGRESS NOTES
"Colorectal Surgery Follow-up    Subjective     Layton Ritchie Jr. is a 64 y.o. male who returns in follow-up for his end ileostomy.     In May 2019, he developed a megacolon from severe Crohn's colitis and a diverting loop ileostomy and blowhole colostomy was performed.  The hope was that with biologic therapy he would get healing of his colon and prevent permanent ileostomy.  However, he continued to have endoscopic evidence of severe left-sided colitis with anal ulceration and stenosis and he also had significant problems with prolapse of his transverse loop colostomy and on 3/11/2020 he had a robotic assisted total proctocolectomy with an omental pedicle flap in the pelvis.  He had a revision of the ileostomy to an end stoma at a new site because of hernia and problems with appliance placement at the previous loop ileostomy site.     He developed a peristomal hernia at the new ileostomy.    He feels that his peristomal hernia might be slightly larger than it was last year.  He is not having any difficulties with pouching.  He is not having any pain or obstructive symptoms.    Medical History:   Past Medical History:   Diagnosis Date    Basal cell carcinoma (BCC) in situ of skin     right nare, left arm     Crohn's disease of colon with complication (CMS/HCC) 2012    \"Crohn's colitis with megacolon\"       Surgical History:   Past Surgical History   Procedure Laterality Date    Colon surgery  05/27/2019    Colostomy/Ileostomy Laparoscopic    Colostomy/Ileostomy Laparoscopic N/A 5/27/2019    Performed by James Hernandez MD at [facility] [place]    Other surgical history Right 12/2019    TAKEDOWN PARAMEDIAN FOREHEAD FLAP    PROCTOCOLECTOMY LAPAROSCOPIC ROBOTIC, END ILEOSTOMY N/A 3/11/2020    Performed by James Hernandez MD at [facility] [place]    Tear duct surgery  12/2019    repaired after basal cell skin cancer was removed from right side of nose- with flap    Tonsillectomy  1962       Allergies: Patient has no known " "allergies.    Current Medications:  Current Outpatient Medications   Medication Sig Dispense Refill    amLODIPine (NORVASC) 10 mg tablet Take 1 tablet by mouth daily.      lisinopriL (PRINIVIL) 5 mg tablet Take 5 mg by mouth every morning.      amoxicillin-pot clavulanate (AUGMENTIN) 875-125 mg per tablet TAKE 1 TABLET IN THE MORNING AND 1 TABLET BEFORE BEDTIME FOR 10 DAYS (Patient not taking: Reported on 11/18/2024)      sildenafiL (VIAGRA) 100 mg tablet Take 100 mg by mouth once daily as needed. (Patient not taking: Reported on 11/18/2024)      ustekinumab (STELARA SUBQ) Inject under the skin every 2 (two) months. Last 1/23/20 (Patient not taking: Reported on 11/18/2024)       No current facility-administered medications for this visit.       Objective     Physicial Exam  Visit Vitals  BP (!) 152/75 (BP Location: Left upper arm, Patient Position: Sitting)   Pulse 76   Temp 37.1 °C (98.7 °F) (Oral)   Ht 1.727 m (5' 8\")   Wt 94.8 kg (209 lb)   SpO2 96%   BMI 31.78 kg/m²       Physical Exam  Vitals reviewed.   Constitutional:       Appearance: Normal appearance. He is well-developed.   HENT:      Head: Normocephalic and atraumatic.   Eyes:      Pupils: Pupils are equal, round, and reactive to light.   Cardiovascular:      Rate and Rhythm: Normal rate and regular rhythm.      Heart sounds: Normal heart sounds. No murmur heard.  Pulmonary:      Effort: Pulmonary effort is normal. No respiratory distress.      Breath sounds: Normal breath sounds. No wheezing.   Abdominal:      Palpations: Abdomen is soft. There is no mass.      Tenderness: There is no abdominal tenderness.      Comments: The abdomen is flat and soft except for bulging at the ileostomy site.  I do not feel any obvious hernia defects that is other old stoma sites.  The parastomal hernia soft and reducible.  The ileostomy itself looks healthy.  There is some inflammatory nodules at the mucocutaneous junction with some mucosal rests on the skin that were " all treated with silver nitrate.  He tolerated this well.   Musculoskeletal:         General: No tenderness. Normal range of motion.      Cervical back: Normal range of motion and neck supple.   Lymphadenopathy:      Cervical: No cervical adenopathy.   Skin:     General: Skin is warm and dry.      Capillary Refill: Capillary refill takes less than 2 seconds.      Findings: No rash.   Neurological:      Mental Status: He is alert and oriented to person, place, and time.      Cranial Nerves: No cranial nerve deficit.   Psychiatric:         Mood and Affect: Mood normal.         Behavior: Behavior normal.             Assessment     Problem List Items Addressed This Visit          Digestive    Crohn's disease of colon with complication (CMS/HCC)    Current Assessment & Plan     He continues on Stelara.  He needs to continue to follow-up with GI now that Dr. Carlson is retired.  I asked him to follow-up with Dr. Gallardo.         Parastomal hernia - Primary    Current Assessment & Plan     His ileostomy is functioning nicely.  His peristomal hernia is stable.  We discussed trying to avoid any surgical intervention for the hernia at this time.  If he starts having obstructive symptoms, pain, or difficulty with pouching, we may need to do a revision.  I will plan to see him back in 1 year.                  James Hernandez MD

## 2024-11-18 NOTE — ASSESSMENT & PLAN NOTE
His ileostomy is functioning nicely.  His peristomal hernia is stable.  We discussed trying to avoid any surgical intervention for the hernia at this time.  If he starts having obstructive symptoms, pain, or difficulty with pouching, we may need to do a revision.  I will plan to see him back in 1 year.

## 2024-11-18 NOTE — ASSESSMENT & PLAN NOTE
He continues on Stelara.  He needs to continue to follow-up with GI now that Dr. Carlson is retired.  I asked him to follow-up with Dr. Gallardo.

## 2025-02-05 ENCOUNTER — APPOINTMENT (OUTPATIENT)
Dept: URBAN - METROPOLITAN AREA CLINIC 193 | Age: 65
Setting detail: DERMATOLOGY
End: 2025-02-05

## 2025-02-05 DIAGNOSIS — D485 NEOPLASM OF UNCERTAIN BEHAVIOR OF SKIN: ICD-10-CM

## 2025-02-05 DIAGNOSIS — Z85.828 PERSONAL HISTORY OF OTHER MALIGNANT NEOPLASM OF SKIN: ICD-10-CM

## 2025-02-05 DIAGNOSIS — L57.0 ACTINIC KERATOSIS: ICD-10-CM

## 2025-02-05 DIAGNOSIS — L57.8 OTHER SKIN CHANGES DUE TO CHRONIC EXPOSURE TO NONIONIZING RADIATION: ICD-10-CM

## 2025-02-05 DIAGNOSIS — L81.4 OTHER MELANIN HYPERPIGMENTATION: ICD-10-CM

## 2025-02-05 DIAGNOSIS — Z85.820 PERSONAL HISTORY OF MALIGNANT MELANOMA OF SKIN: ICD-10-CM

## 2025-02-05 DIAGNOSIS — L82.1 OTHER SEBORRHEIC KERATOSIS: ICD-10-CM

## 2025-02-05 DIAGNOSIS — D22 MELANOCYTIC NEVI: ICD-10-CM

## 2025-02-05 PROBLEM — D22.9 MELANOCYTIC NEVI, UNSPECIFIED: Status: ACTIVE | Noted: 2025-02-05

## 2025-02-05 PROBLEM — D48.5 NEOPLASM OF UNCERTAIN BEHAVIOR OF SKIN: Status: ACTIVE | Noted: 2025-02-05

## 2025-02-05 PROCEDURE — 11102 TANGNTL BX SKIN SINGLE LES: CPT | Mod: 59

## 2025-02-05 PROCEDURE — OTHER LIQUID NITROGEN: OTHER

## 2025-02-05 PROCEDURE — OTHER COUNSELING: OTHER

## 2025-02-05 PROCEDURE — OTHER PRESCRIPTION MEDICATION MANAGEMENT: OTHER

## 2025-02-05 PROCEDURE — OTHER BIOPSY BY SHAVE METHOD: OTHER

## 2025-02-05 PROCEDURE — 17004 DESTROY PREMAL LESIONS 15/>: CPT

## 2025-02-05 PROCEDURE — 11103 TANGNTL BX SKIN EA SEP/ADDL: CPT

## 2025-02-05 PROCEDURE — OTHER PHOTODYNAMIC THERAPY COUNSELING: OTHER

## 2025-02-05 PROCEDURE — 99214 OFFICE O/P EST MOD 30 MIN: CPT | Mod: 25

## 2025-02-05 ASSESSMENT — LOCATION ZONE DERM
LOCATION ZONE: EAR
LOCATION ZONE: SCALP
LOCATION ZONE: FACE
LOCATION ZONE: LEG
LOCATION ZONE: ARM
LOCATION ZONE: TRUNK
LOCATION ZONE: NOSE
LOCATION ZONE: HAND
LOCATION ZONE: FINGER

## 2025-02-05 ASSESSMENT — LOCATION DETAILED DESCRIPTION DERM
LOCATION DETAILED: LEFT MID-UPPER BACK
LOCATION DETAILED: RIGHT PROXIMAL PRETIBIAL REGION
LOCATION DETAILED: RIGHT INFERIOR LATERAL FOREHEAD
LOCATION DETAILED: LEFT DISTAL ULNAR DORSAL FOREARM
LOCATION DETAILED: RIGHT PROXIMAL DORSAL MIDDLE FINGER
LOCATION DETAILED: RIGHT CENTRAL MALAR CHEEK
LOCATION DETAILED: LEFT FOREHEAD
LOCATION DETAILED: NASAL SUPRATIP
LOCATION DETAILED: LEFT PROXIMAL DORSAL FOREARM
LOCATION DETAILED: RIGHT SUPERIOR FOREHEAD
LOCATION DETAILED: LEFT LATERAL DORSAL WRIST
LOCATION DETAILED: LEFT RADIAL DORSAL HAND
LOCATION DETAILED: LEFT DISTAL LATERAL POSTERIOR UPPER ARM
LOCATION DETAILED: LEFT SUPERIOR FOREHEAD
LOCATION DETAILED: RIGHT MEDIAL FOREHEAD
LOCATION DETAILED: LEFT INFERIOR HELIX
LOCATION DETAILED: LEFT ULNAR DORSAL HAND
LOCATION DETAILED: RIGHT FOREHEAD
LOCATION DETAILED: LEFT CENTRAL POSTAURICULAR SKIN
LOCATION DETAILED: LEFT PROXIMAL LATERAL POSTERIOR UPPER ARM
LOCATION DETAILED: RIGHT PROXIMAL DORSAL INDEX FINGER
LOCATION DETAILED: RIGHT CLAVICULAR SKIN
LOCATION DETAILED: RIGHT ANTERIOR DISTAL UPPER ARM

## 2025-02-05 ASSESSMENT — LOCATION SIMPLE DESCRIPTION DERM
LOCATION SIMPLE: RIGHT UPPER ARM
LOCATION SIMPLE: NOSE
LOCATION SIMPLE: RIGHT PRETIBIAL REGION
LOCATION SIMPLE: LEFT FOREARM
LOCATION SIMPLE: RIGHT MIDDLE FINGER
LOCATION SIMPLE: RIGHT INDEX FINGER
LOCATION SIMPLE: RIGHT FOREHEAD
LOCATION SIMPLE: RIGHT CHEEK
LOCATION SIMPLE: LEFT WRIST
LOCATION SIMPLE: LEFT EAR
LOCATION SIMPLE: LEFT HAND
LOCATION SIMPLE: LEFT UPPER ARM
LOCATION SIMPLE: LEFT UPPER BACK
LOCATION SIMPLE: SCALP
LOCATION SIMPLE: LEFT FOREHEAD
LOCATION SIMPLE: RIGHT CLAVICULAR SKIN

## 2025-02-05 ASSESSMENT — TOTAL NUMBER OF LESIONS: # OF LESIONS?: 50

## 2025-02-05 NOTE — PROCEDURE: LIQUID NITROGEN
Application Tool (Optional): Cry-AC
Duration Of Freeze Thaw-Cycle (Seconds): 3
Show Aperture Variable?: Yes
Consent: The patient's consent was obtained including but not limited to risks of crusting, scabbing, blistering, scarring, darker or lighter pigmentary change, recurrence, incomplete removal and infection.
Detail Level: Detailed
Post-Care Instructions: I reviewed with the patient in detail post-care instructions. Patient is to wear sunprotection, and avoid picking at any of the treated lesions. Pt may apply Vaseline to crusted or scabbing areas.
Render Note In Bullet Format When Appropriate: No
Number Of Freeze-Thaw Cycles: 2 freeze-thaw cycles

## 2025-02-05 NOTE — PROCEDURE: COUNSELING
Detail Level: Detailed
Quality 137: Melanoma: Continuity Of Care - Recall System: Patient information entered into a recall system that includes: target date for the next exam specified AND a process to follow up with patients regarding missed or unscheduled appointments
Detail Level: Generalized
Patient Specific Counseling (Will Not Stick From Patient To Patient): severe widespread actinic keratoses and hypertrophic AKs wayne on forearms, dorsal hands and face with continued extensive sun exposure without protection\\nreviewed risk of progression to scc\\nphotoprotection advised\\ndiscussed pdt vs efudex to face

## 2025-02-05 NOTE — PROCEDURE: PRESCRIPTION MEDICATION MANAGEMENT
Initiate Treatment: discussed pdt vs efudex - start with pdt to face
Detail Level: Zone
Render In Strict Bullet Format?: No

## 2025-02-05 NOTE — PROCEDURE: BIOPSY BY SHAVE METHOD
Detail Level: Detailed
Depth Of Biopsy: dermis
Was A Bandage Applied: Yes
Size Of Lesion In Cm: 0.5
X Size Of Lesion In Cm: 0
Biopsy Type: H and E
Biopsy Method: Dermablade
Anesthesia Type: 0.5% lidocaine with 1:200,000 epinephrine and a 1:10 solution of 8.4% sodium bicarbonate
Hemostasis: Drysol
Wound Care: Petrolatum
Dressing: bandage
Destruction After The Procedure: No
Type Of Destruction Used: Curettage
Curettage Text: The wound bed was treated with curettage after the biopsy was performed.
Cryotherapy Text: The wound bed was treated with cryotherapy after the biopsy was performed.
Electrodesiccation Text: The wound bed was treated with electrodesiccation after the biopsy was performed.
Electrodesiccation And Curettage Text: The wound bed was treated with electrodesiccation and curettage after the biopsy was performed.
Silver Nitrate Text: The wound bed was treated with silver nitrate after the biopsy was performed.
Lab: -[id number]
Medical Necessity Information: It is in your best interest to select a reason for this procedure from the list below. All of these items fulfill various CMS LCD requirements except the new and changing color options.
Consent: Written consent was obtained and risks were reviewed including but not limited to scarring, infection, bleeding, scabbing, incomplete removal, nerve damage and allergy to anesthesia.
Post-Care Instructions: I reviewed with the patient in detail post-care instructions. Patient is to keep the biopsy site dry overnight, and then apply petroleum jelly daily and a bandaid until healed. Patient may apply hydrogen peroxide soaks to remove any crusting.
Notification Instructions: Patient will be notified of biopsy results. However, patient instructed to call the office if not contacted within 2 weeks.
Billing Type: Third-Party Bill
Information: Selecting Yes will display possible errors in your note based on the variables you have selected. This validation is only offered as a suggestion for you. PLEASE NOTE THAT THE VALIDATION TEXT WILL BE REMOVED WHEN YOU FINALIZE YOUR NOTE. IF YOU WANT TO FAX A PRELIMINARY NOTE YOU WILL NEED TO TOGGLE THIS TO 'NO' IF YOU DO NOT WANT IT IN YOUR FAXED NOTE.
Size Of Lesion In Cm: 1

## 2025-02-21 NOTE — PROCEDURE: MOHS SURGERY
Yes Purse String (Simple) Text: Given the location of the defect and the characteristics of the surrounding skin a purse string closure was deemed most appropriate.  Undermining was performed circumferentially around the surgical defect when appropriate.  A purse string suture was then placed and tightened.

## 2025-03-06 ENCOUNTER — APPOINTMENT (OUTPATIENT)
Dept: URBAN - METROPOLITAN AREA CLINIC 193 | Age: 65
Setting detail: DERMATOLOGY
End: 2025-03-06

## 2025-03-06 PROBLEM — C44.519 BASAL CELL CARCINOMA OF SKIN OF OTHER PART OF TRUNK: Status: ACTIVE | Noted: 2025-03-06

## 2025-03-06 PROBLEM — C44.612 BASAL CELL CARCINOMA OF SKIN OF RIGHT UPPER LIMB, INCLUDING SHOULDER: Status: ACTIVE | Noted: 2025-03-06

## 2025-03-06 PROCEDURE — OTHER COUNSELING: OTHER

## 2025-03-06 PROCEDURE — 17262 DSTRJ MAL LES T/A/L 1.1-2.0: CPT | Mod: 76

## 2025-03-06 PROCEDURE — 17262 DSTRJ MAL LES T/A/L 1.1-2.0: CPT

## 2025-03-06 PROCEDURE — OTHER CURETTAGE AND DESTRUCTION: OTHER

## 2025-03-06 NOTE — PROCEDURE: CURETTAGE AND DESTRUCTION
Detail Level: Detailed
Accession # (Optional): [id number]
Number Of Curettages: 3
Size Of Lesion In Cm: 1.2
Add Intralesional Injection: No
Concentration (Mg/Ml Or Millions Of Plaque Forming Units/Cc): 0.01
Total Volume (Ccs): 1
Anesthesia Type: 1% lidocaine with epinephrine
Anesthesia Volume In Cc: 1.5
Cautery Type: electrodesiccation
What Was Performed First?: Curettage
Final Size Statement: The size of the lesion after curettage was
Additional Information: (Optional): The wound was cleaned, and a pressure dressing was applied.  The patient received detailed post-op instructions.
Consent was obtained from the patient. The risks, benefits and alternatives to therapy were discussed in detail. Specifically, the risks of infection, scarring, bleeding, prolonged wound healing, nerve injury, incomplete removal, allergy to anesthesia and recurrence were addressed. Alternatives to ED&C, such as: surgical removal and XRT were also discussed.  Prior to the procedure, the treatment site was clearly identified and confirmed by the patient. All components of Universal Protocol/PAUSE Rule completed.
Render Post-Care Instructions In Note?: yes
Post-Care Instructions: I reviewed with the patient in detail post-care instructions. Patient is to keep the area dry for 48 hours, and not to engage in any swimming until the area is healed. Should the patient develop any fevers, chills, bleeding, severe pain patient will contact the office immediately.
Bill As A Line Item Or As Units: Line Item

## 2025-05-26 NOTE — PROGRESS NOTES
General Surgery Daily Progress Note    Subjective  Patient reports less pain overnight. Watery BM overnight.   Objective     Vital signs in last 24 hours:  Temp:  [35.8 °C (96.5 °F)-37.9 °C (100.2 °F)] 35.8 °C (96.5 °F)  Heart Rate:  [49-98] 55  Resp:  [16-18] 16  BP: ()/(65-74) 98/66      Intake/Output Summary (Last 24 hours) at 05/27/19 0705  Last data filed at 05/27/19 0655   Gross per 24 hour   Intake           4262.5 ml   Output              951 ml   Net           3311.5 ml     Intake/Output this shift:  No intake/output data recorded.    Physical Exam    General appearance: alert, appears stated age and cooperative  Lungs: no increased WOB  Heart: regular rate and rhythm  Abdomen: soft, distended, NT  Extremities: extremities normal, warm and well-perfused; no cyanosis, clubbing, or edema  Skin: Skin color, texture, turgor normal. No rashes or lesions  Neurologic: Grossly normal      Assessment/Plan     59-year-old male with inflammatory bowel disease status unfortunately uncontrolled who presents with abdominal pain and concern due to a 17 cm transverse colon seen on CT scan.      - keep NPO  - IVF  - CTX, flagyl  - AM KUB  - serial abdominal exams  - Lov for dvt ppx  - f/u GI  loop ileostomy and blowhole colostomy today   Page [phone] with any questions  Angel Melendez, DO       Monthly or less

## 2025-08-06 ENCOUNTER — APPOINTMENT (OUTPATIENT)
Dept: URBAN - METROPOLITAN AREA CLINIC 193 | Age: 65
Setting detail: DERMATOLOGY
End: 2025-08-06

## 2025-08-06 DIAGNOSIS — Z71.89 OTHER SPECIFIED COUNSELING: ICD-10-CM

## 2025-08-06 DIAGNOSIS — Z85.828 PERSONAL HISTORY OF OTHER MALIGNANT NEOPLASM OF SKIN: ICD-10-CM

## 2025-08-06 DIAGNOSIS — L57.0 ACTINIC KERATOSIS: ICD-10-CM

## 2025-08-06 DIAGNOSIS — D22 MELANOCYTIC NEVI: ICD-10-CM

## 2025-08-06 DIAGNOSIS — L57.8 OTHER SKIN CHANGES DUE TO CHRONIC EXPOSURE TO NONIONIZING RADIATION: ICD-10-CM

## 2025-08-06 DIAGNOSIS — L82.1 OTHER SEBORRHEIC KERATOSIS: ICD-10-CM

## 2025-08-06 DIAGNOSIS — L81.4 OTHER MELANIN HYPERPIGMENTATION: ICD-10-CM

## 2025-08-06 DIAGNOSIS — Z85.820 PERSONAL HISTORY OF MALIGNANT MELANOMA OF SKIN: ICD-10-CM

## 2025-08-06 DIAGNOSIS — D485 NEOPLASM OF UNCERTAIN BEHAVIOR OF SKIN: ICD-10-CM

## 2025-08-06 PROBLEM — D22.9 MELANOCYTIC NEVI, UNSPECIFIED: Status: ACTIVE | Noted: 2025-08-06

## 2025-08-06 PROBLEM — D48.5 NEOPLASM OF UNCERTAIN BEHAVIOR OF SKIN: Status: ACTIVE | Noted: 2025-08-06

## 2025-08-06 PROCEDURE — OTHER LIQUID NITROGEN: OTHER

## 2025-08-06 PROCEDURE — 17004 DESTROY PREMAL LESIONS 15/>: CPT

## 2025-08-06 PROCEDURE — OTHER COUNSELING: OTHER

## 2025-08-06 PROCEDURE — 11102 TANGNTL BX SKIN SINGLE LES: CPT | Mod: 59

## 2025-08-06 PROCEDURE — OTHER PHOTODYNAMIC THERAPY COUNSELING: OTHER

## 2025-08-06 PROCEDURE — OTHER BIOPSY BY SHAVE METHOD: OTHER

## 2025-08-06 PROCEDURE — 99213 OFFICE O/P EST LOW 20 MIN: CPT | Mod: 25

## 2025-08-06 ASSESSMENT — LOCATION ZONE DERM
LOCATION ZONE: LEG
LOCATION ZONE: ARM
LOCATION ZONE: FACE
LOCATION ZONE: TRUNK

## 2025-08-06 ASSESSMENT — LOCATION SIMPLE DESCRIPTION DERM
LOCATION SIMPLE: RIGHT FOREARM
LOCATION SIMPLE: RIGHT SHOULDER
LOCATION SIMPLE: RIGHT PRETIBIAL REGION
LOCATION SIMPLE: LEFT CHEEK
LOCATION SIMPLE: RIGHT CHEEK
LOCATION SIMPLE: LEFT FOREARM
LOCATION SIMPLE: RIGHT CLAVICULAR SKIN

## 2025-08-06 ASSESSMENT — LOCATION DETAILED DESCRIPTION DERM
LOCATION DETAILED: RIGHT PROXIMAL DORSAL FOREARM
LOCATION DETAILED: RIGHT CENTRAL MALAR CHEEK
LOCATION DETAILED: RIGHT PROXIMAL RADIAL DORSAL FOREARM
LOCATION DETAILED: LEFT DISTAL DORSAL FOREARM
LOCATION DETAILED: RIGHT INFERIOR CENTRAL MALAR CHEEK
LOCATION DETAILED: LEFT SUPERIOR CENTRAL MALAR CHEEK
LOCATION DETAILED: LEFT INFERIOR CENTRAL MALAR CHEEK
LOCATION DETAILED: RIGHT PROXIMAL PRETIBIAL REGION
LOCATION DETAILED: RIGHT CLAVICULAR SKIN
LOCATION DETAILED: LEFT PROXIMAL DORSAL FOREARM
LOCATION DETAILED: RIGHT DISTAL DORSAL FOREARM
LOCATION DETAILED: RIGHT POSTERIOR SHOULDER
LOCATION DETAILED: RIGHT SUPERIOR MEDIAL BUCCAL CHEEK

## 2025-08-19 ENCOUNTER — APPOINTMENT (OUTPATIENT)
Dept: URBAN - METROPOLITAN AREA CLINIC 193 | Age: 65
Setting detail: DERMATOLOGY
End: 2025-08-19

## 2025-08-19 PROBLEM — C44.619 BASAL CELL CARCINOMA OF SKIN OF LEFT UPPER LIMB, INCLUDING SHOULDER: Status: ACTIVE | Noted: 2025-08-19

## 2025-08-19 PROCEDURE — 17262 DSTRJ MAL LES T/A/L 1.1-2.0: CPT

## 2025-08-19 PROCEDURE — OTHER CURETTAGE AND DESTRUCTION: OTHER

## 2025-08-19 PROCEDURE — OTHER COUNSELING: OTHER

## 2025-08-19 PROCEDURE — OTHER MIPS QUALITY: OTHER

## (undated) DEVICE — SUTURE MONOCRYL 4-0 Y426H PS-2 27IN

## (undated) DEVICE — ADHESIVE SKIN DERMABOND ADVANCED 0.7ML

## (undated) DEVICE — SOLN IRRIG .9%SOD 1000ML

## (undated) DEVICE — SEALER VESSEL DAVINCI XI DISP

## (undated) DEVICE — ***USE 138859*** SUTURE PDS II  0 Z334H CT-2 27IN VIOLET

## (undated) DEVICE — ***USE 138850*** SUTURE PDS II 1 Z371T CTX

## (undated) DEVICE — ***USE 56941*** SUTURE VICRYL 0 J603H UR-6

## (undated) DEVICE — TIP SUCTION YANKAUER

## (undated) DEVICE — SOLUTION ELECTROLUBE ANTI-STICK

## (undated) DEVICE — BRIEFS MESH LARGE/EXTRA LARGE 2/PK

## (undated) DEVICE — SHEARS TIP COVER DAVINCI ONE USE

## (undated) DEVICE — COVER BACK TABLE ZONE-REINFORC

## (undated) DEVICE — OBTURATOR BLADELESS 8MM XI

## (undated) DEVICE — GLOVE SURG PROTEXIS PF 7.0 2D72NS70X

## (undated) DEVICE — ***USE 56900*** SUTURE CHROMIC GUT 3-0 G122H

## (undated) DEVICE — TUBE SUCTION 1/4INX20FT STERILE

## (undated) DEVICE — SPONGE LAP 18X18 SAFE-T RFID ENHANCED XRAY

## (undated) DEVICE — EVACUATOR CLOSED SUCTION 100C

## (undated) DEVICE — SEAL CANNULA ENDOWRIST STAPLER DISP

## (undated) DEVICE — PORT ACCESS 5MM W/ BLADELESS OPTICAL TIP 120MM LONG

## (undated) DEVICE — SUTURE ETHILON 2-0   1674H

## (undated) DEVICE — Device

## (undated) DEVICE — SANI-SERVE SLUSH DRAPE SUBSTIT

## (undated) DEVICE — RETRACTOR SMALL WOUND

## (undated) DEVICE — TROCAR 1ST ENTRY 5 X 100MM ADV FIX

## (undated) DEVICE — MARKER SURGICAL SKIN

## (undated) DEVICE — STRYKEFLOW 2 W/ DISP TIP

## (undated) DEVICE — SUTURE VICRYL 3-0 J416H SH UNDYED

## (undated) DEVICE — GOWN SURG X-LARGE MICROCOOL

## (undated) DEVICE — SYSTEM LABELING CORRECT MEDICATION

## (undated) DEVICE — TROCAR SLEEVE W/ BALLOON 5MM

## (undated) DEVICE — NEEDLE DISP HYPO 25GX1-1/2IN

## (undated) DEVICE — COVER LIGHT HANDLE/CAMERA

## (undated) DEVICE — TIPS SCISSOR MICROLINE LAP

## (undated) DEVICE — DRAIN ROUND 15FR 3/16IN

## (undated) DEVICE — CLOSURE TAIL FOR SURFIT BAG

## (undated) DEVICE — GLOVE SZ 7 LINER PROTEXIS PI BL

## (undated) DEVICE — ***USE 138663*** SUTURE VICRYL 3-0 J904T TIES

## (undated) DEVICE — DRAPE HALF STERILE

## (undated) DEVICE — SOLN IRRIG STER WATER 1000ML

## (undated) DEVICE — GAUZE VASELINE 1X8

## (undated) DEVICE — PAD GROUND ELECTROSURGICAL W/CORD

## (undated) DEVICE — LITE GLOVE 1 HANDLE COVER

## (undated) DEVICE — BAG OSTOMY ADJUSTABLE

## (undated) DEVICE — APPLICATOR COTTON TIP 6IN STERILE MEDC

## (undated) DEVICE — SEAL UNIVERSAL 5MM-8MM XI

## (undated) DEVICE — SYRINGE 10CC CONTROL LL

## (undated) DEVICE — DRESSING ABD STER LGE 8X10

## (undated) DEVICE — DRAPE POUCH IRRIGATION

## (undated) DEVICE — PACK RFID LAP COLON

## (undated) DEVICE — SET AIRSEAL FILTERED TUBE SET

## (undated) DEVICE — PACK MAJOR PROCEDURE TLH

## (undated) DEVICE — TUBING VITAL VUE

## (undated) DEVICE — EVACUATOR PLUME-AWAY LAP SMOKE PKG

## (undated) DEVICE — PINPOINT ICG PAQ

## (undated) DEVICE — ***USE 57000*** SUTURE PROLENE  0   8412H

## (undated) DEVICE — BLADE SCALPEL #15

## (undated) DEVICE — KIT NEEDLE PAD LRG LIGHT GLOVE

## (undated) DEVICE — ***USE 136219***CATHETER URETHRAL RED RUBBER 16IN X 20 FR

## (undated) DEVICE — SUTURE VICRYL 4-0 J426H PS-2 27IN

## (undated) DEVICE — SHEATH ENDOWRIST STAPLER 45 DISP

## (undated) DEVICE — TROCAR BLUNT TIP 12 X 100MM

## (undated) DEVICE — SUTURE PDS II 2-0 Z317H SH 27IN VIOLET

## (undated) DEVICE — DRAPE LAPAROTOMY NO POUCHES

## (undated) DEVICE — SUTURE VICRYL ENDOLOOP   0   EJ10G

## (undated) DEVICE — ***USE 56952*** SUTURE VICRYL 1 J371H CTX 36IN VIOLET

## (undated) DEVICE — XI, ENDO WRIST 12-8MM REDUCER

## (undated) DEVICE — SPONGE RAYTEC 8 X 4 16-PLY

## (undated) DEVICE — ***USE 138525*** SUTURE VICRYL 0 J106T TIES 18IN VIOLET

## (undated) DEVICE — COVERS LIGHT HANDLE DISPOSABLE

## (undated) DEVICE — ***USE 138537*** SUTURE VICRYL 0 J340H CT-1 27IN VIOLET

## (undated) DEVICE — DRAPE COLUMN DAVINCI XI

## (undated) DEVICE — PENCIL ESU HAND CONTROLLED 3 LED

## (undated) DEVICE — *T* HARMONIC HD 1001 SHEARS 36CM

## (undated) DEVICE — STERISTRIP 1/2INX4IN

## (undated) DEVICE — POUCH, 2 3/4IN

## (undated) DEVICE — MANIFOLD FOUR PORT NEPTUNE

## (undated) DEVICE — DRAPE ARM DAVINCI XI

## (undated) DEVICE — ***USE 57698*** SLEEVE FLOWTRON DVT CALF SINGLE USE

## (undated) DEVICE — COVER LIGHTHANDLE

## (undated) DEVICE — ***USE 143206***SYRINGE BULB

## (undated) DEVICE — NEEDLE INSUFFLATION 120MM

## (undated) DEVICE — SUTURE VICRYL 3-0 J316H SH VIOLET